# Patient Record
Sex: MALE | Race: OTHER | HISPANIC OR LATINO | URBAN - METROPOLITAN AREA
[De-identification: names, ages, dates, MRNs, and addresses within clinical notes are randomized per-mention and may not be internally consistent; named-entity substitution may affect disease eponyms.]

---

## 2017-03-23 ENCOUNTER — EMERGENCY (EMERGENCY)
Facility: HOSPITAL | Age: 42
LOS: 0 days | Discharge: HOME | End: 2017-03-23
Attending: EMERGENCY MEDICINE | Admitting: EMERGENCY MEDICINE
Payer: OTHER MISCELLANEOUS

## 2017-03-23 VITALS
DIASTOLIC BLOOD PRESSURE: 103 MMHG | SYSTOLIC BLOOD PRESSURE: 170 MMHG | RESPIRATION RATE: 18 BRPM | TEMPERATURE: 99 F | OXYGEN SATURATION: 99 % | HEART RATE: 75 BPM

## 2017-03-23 VITALS — HEIGHT: 64 IN | WEIGHT: 145.06 LBS

## 2017-03-23 DIAGNOSIS — M54.2 CERVICALGIA: ICD-10-CM

## 2017-03-23 LAB
ALBUMIN SERPL ELPH-MCNC: 4.5 G/DL — SIGNIFICANT CHANGE UP (ref 3.3–5)
ALP SERPL-CCNC: 96 U/L — SIGNIFICANT CHANGE UP (ref 40–120)
ALT FLD-CCNC: 42 U/L — SIGNIFICANT CHANGE UP (ref 12–78)
ANION GAP SERPL CALC-SCNC: 10 MMOL/L — SIGNIFICANT CHANGE UP (ref 5–17)
AST SERPL-CCNC: 31 U/L — SIGNIFICANT CHANGE UP (ref 15–37)
BASOPHILS # BLD AUTO: 0.1 K/UL — SIGNIFICANT CHANGE UP (ref 0–0.2)
BASOPHILS NFR BLD AUTO: 1.1 % — SIGNIFICANT CHANGE UP (ref 0–2)
BILIRUB SERPL-MCNC: 0.5 MG/DL — SIGNIFICANT CHANGE UP (ref 0.2–1.2)
BUN SERPL-MCNC: 12 MG/DL — SIGNIFICANT CHANGE UP (ref 7–23)
CALCIUM SERPL-MCNC: 9.3 MG/DL — SIGNIFICANT CHANGE UP (ref 8.5–10.1)
CHLORIDE SERPL-SCNC: 103 MMOL/L — SIGNIFICANT CHANGE UP (ref 96–108)
CO2 SERPL-SCNC: 28 MMOL/L — SIGNIFICANT CHANGE UP (ref 22–31)
CREAT SERPL-MCNC: 1 MG/DL — SIGNIFICANT CHANGE UP (ref 0.5–1.3)
EOSINOPHIL # BLD AUTO: 0.4 K/UL — SIGNIFICANT CHANGE UP (ref 0–0.5)
EOSINOPHIL NFR BLD AUTO: 5.2 % — SIGNIFICANT CHANGE UP (ref 0–6)
GLUCOSE SERPL-MCNC: 113 MG/DL — HIGH (ref 70–99)
HCT VFR BLD CALC: 51 % — HIGH (ref 39–50)
HGB BLD-MCNC: 17.9 G/DL — HIGH (ref 13–17)
LYMPHOCYTES # BLD AUTO: 1.8 K/UL — SIGNIFICANT CHANGE UP (ref 1–3.3)
LYMPHOCYTES # BLD AUTO: 26.8 % — SIGNIFICANT CHANGE UP (ref 13–44)
MCHC RBC-ENTMCNC: 30.7 PG — SIGNIFICANT CHANGE UP (ref 27–34)
MCHC RBC-ENTMCNC: 35 GM/DL — SIGNIFICANT CHANGE UP (ref 32–36)
MCV RBC AUTO: 87.6 FL — SIGNIFICANT CHANGE UP (ref 80–100)
MONOCYTES # BLD AUTO: 0.4 K/UL — SIGNIFICANT CHANGE UP (ref 0–0.9)
MONOCYTES NFR BLD AUTO: 6.2 % — SIGNIFICANT CHANGE UP (ref 2–14)
NEUTROPHILS # BLD AUTO: 4.2 K/UL — SIGNIFICANT CHANGE UP (ref 1.8–7.4)
NEUTROPHILS NFR BLD AUTO: 60.7 % — SIGNIFICANT CHANGE UP (ref 43–77)
PLATELET # BLD AUTO: 260 K/UL — SIGNIFICANT CHANGE UP (ref 150–400)
POTASSIUM SERPL-MCNC: 4.2 MMOL/L — SIGNIFICANT CHANGE UP (ref 3.5–5.3)
POTASSIUM SERPL-SCNC: 4.2 MMOL/L — SIGNIFICANT CHANGE UP (ref 3.5–5.3)
PROT SERPL-MCNC: 8 GM/DL — SIGNIFICANT CHANGE UP (ref 6–8.3)
RBC # BLD: 5.82 M/UL — HIGH (ref 4.2–5.8)
RBC # FLD: 11.1 % — SIGNIFICANT CHANGE UP (ref 10.3–14.5)
S PYO AG SPEC QL IA: NEGATIVE — SIGNIFICANT CHANGE UP
SODIUM SERPL-SCNC: 141 MMOL/L — SIGNIFICANT CHANGE UP (ref 135–145)
WBC # BLD: 6.9 K/UL — SIGNIFICANT CHANGE UP (ref 3.8–10.5)
WBC # FLD AUTO: 6.9 K/UL — SIGNIFICANT CHANGE UP (ref 3.8–10.5)

## 2017-03-23 PROCEDURE — 99284 EMERGENCY DEPT VISIT MOD MDM: CPT

## 2017-03-23 PROCEDURE — 70491 CT SOFT TISSUE NECK W/DYE: CPT | Mod: 26

## 2017-03-23 RX ORDER — SODIUM CHLORIDE 9 MG/ML
1000 INJECTION INTRAMUSCULAR; INTRAVENOUS; SUBCUTANEOUS ONCE
Qty: 0 | Refills: 0 | Status: COMPLETED | OUTPATIENT
Start: 2017-03-23 | End: 2017-03-23

## 2017-03-23 RX ADMIN — SODIUM CHLORIDE 1000 MILLILITER(S): 9 INJECTION INTRAMUSCULAR; INTRAVENOUS; SUBCUTANEOUS at 13:55

## 2017-03-23 NOTE — ED STATDOCS - NS ED MD SCRIBE ATTENDING SCRIBE SECTIONS
PROGRESS NOTE/HISTORY OF PRESENT ILLNESS/CONSULTATIONS/SHIFT CHANGE/PAST MEDICAL/SURGICAL/SOCIAL HISTORY/PHYSICAL EXAM/RESULTS/DISPOSITION/REVIEW OF SYSTEMS

## 2017-03-23 NOTE — ED STATDOCS - ATTENDING CONTRIBUTION TO CARE
I, Hudson Jackman MD,  performed the initial face to face bedside interview with this patient regarding history of present illness, review of symptoms and relevant past medical, social and family history.  I completed an independent physical examination.  I was the initial provider who evaluated this patient. I have signed out the follow up of any pending tests (i.e. labs, radiological studies) to the ACP.  I have communicated the patient’s plan of care and disposition with the ACP.  The history, relevant review of systems, past medical and surgical history, medical decision making, and physical examination was documented by the scribe in my presence and I attest to the accuracy of the documentation.

## 2017-03-23 NOTE — ED STATDOCS - ENMT, MLM
Nasal mucosa clear.  Mouth with normal mucosa  Throat has no vesicles, no oropharyngeal exudates and uvula is midline. tonsil swelling right side, mild erythema, mild cervical lymphadenopathy

## 2017-03-23 NOTE — ED STATDOCS - OBJECTIVE STATEMENT
40 y/o M presents to ED c/o right ear pain with radiation to right side of neck. +Pain with swallowing. Had an accident in his right ear 3 years ago, had negative workups for infections, c/o persistent right ear pain. Was on amoxicillin last week. Ear pain worse in cold weather. Denies other constitutional complaints at this time. Denies recent travel. Denies smoking, drinking, drug use.

## 2017-03-23 NOTE — ED STATDOCS - PROGRESS NOTE DETAILS
41 yr. old male PMH:  presents to ED with pain in right ear radiating  to right side of neck 41 yr. old male PMH:  presents to ED with pain in right ear radiating  to right side of neck Rx. amoxicillin 1 wek ago by PMD  Dr. Nicole at [facility].  Seen and examined by attending MD Dr. Jackman in Intake. IV Labs, CT soft tissue neck. Will F/U with results and re evaluate. MTangalber NP

## 2017-04-03 ENCOUNTER — EMERGENCY (EMERGENCY)
Facility: HOSPITAL | Age: 42
LOS: 0 days | Discharge: HOME | End: 2017-04-03
Attending: EMERGENCY MEDICINE | Admitting: EMERGENCY MEDICINE
Payer: MEDICAID

## 2017-04-03 VITALS
HEART RATE: 72 BPM | DIASTOLIC BLOOD PRESSURE: 95 MMHG | OXYGEN SATURATION: 100 % | TEMPERATURE: 98 F | SYSTOLIC BLOOD PRESSURE: 137 MMHG | RESPIRATION RATE: 18 BRPM

## 2017-04-03 VITALS — WEIGHT: 145.95 LBS | HEIGHT: 66 IN

## 2017-04-03 DIAGNOSIS — R42 DIZZINESS AND GIDDINESS: ICD-10-CM

## 2017-04-03 DIAGNOSIS — R10.9 UNSPECIFIED ABDOMINAL PAIN: ICD-10-CM

## 2017-04-03 DIAGNOSIS — R11.10 VOMITING, UNSPECIFIED: ICD-10-CM

## 2017-04-03 LAB
ALBUMIN SERPL ELPH-MCNC: 4.1 G/DL — SIGNIFICANT CHANGE UP (ref 3.3–5)
ALP SERPL-CCNC: 83 U/L — SIGNIFICANT CHANGE UP (ref 40–120)
ALT FLD-CCNC: 41 U/L — SIGNIFICANT CHANGE UP (ref 12–78)
ANION GAP SERPL CALC-SCNC: 8 MMOL/L — SIGNIFICANT CHANGE UP (ref 5–17)
APPEARANCE UR: (no result)
AST SERPL-CCNC: 25 U/L — SIGNIFICANT CHANGE UP (ref 15–37)
BACTERIA # UR AUTO: NEGATIVE — SIGNIFICANT CHANGE UP
BASOPHILS # BLD AUTO: 0.1 K/UL — SIGNIFICANT CHANGE UP (ref 0–0.2)
BASOPHILS NFR BLD AUTO: 1.1 % — SIGNIFICANT CHANGE UP (ref 0–2)
BILIRUB SERPL-MCNC: 0.6 MG/DL — SIGNIFICANT CHANGE UP (ref 0.2–1.2)
BILIRUB UR-MCNC: NEGATIVE — SIGNIFICANT CHANGE UP
BUN SERPL-MCNC: 11 MG/DL — SIGNIFICANT CHANGE UP (ref 7–23)
CALCIUM SERPL-MCNC: 9.2 MG/DL — SIGNIFICANT CHANGE UP (ref 8.5–10.1)
CHLORIDE SERPL-SCNC: 107 MMOL/L — SIGNIFICANT CHANGE UP (ref 96–108)
CO2 SERPL-SCNC: 27 MMOL/L — SIGNIFICANT CHANGE UP (ref 22–31)
COLOR SPEC: YELLOW — SIGNIFICANT CHANGE UP
COMMENT - URINE: SIGNIFICANT CHANGE UP
CREAT SERPL-MCNC: 0.97 MG/DL — SIGNIFICANT CHANGE UP (ref 0.5–1.3)
DIFF PNL FLD: NEGATIVE — SIGNIFICANT CHANGE UP
EOSINOPHIL # BLD AUTO: 0.2 K/UL — SIGNIFICANT CHANGE UP (ref 0–0.5)
EOSINOPHIL NFR BLD AUTO: 2.6 % — SIGNIFICANT CHANGE UP (ref 0–6)
EPI CELLS # UR: SIGNIFICANT CHANGE UP
GLUCOSE SERPL-MCNC: 115 MG/DL — HIGH (ref 70–99)
GLUCOSE UR QL: NEGATIVE MG/DL — SIGNIFICANT CHANGE UP
HCT VFR BLD CALC: 48.9 % — SIGNIFICANT CHANGE UP (ref 39–50)
HGB BLD-MCNC: 17.3 G/DL — HIGH (ref 13–17)
KETONES UR-MCNC: NEGATIVE — SIGNIFICANT CHANGE UP
LEUKOCYTE ESTERASE UR-ACNC: NEGATIVE — SIGNIFICANT CHANGE UP
LIDOCAIN IGE QN: 134 U/L — SIGNIFICANT CHANGE UP (ref 73–393)
LYMPHOCYTES # BLD AUTO: 1.6 K/UL — SIGNIFICANT CHANGE UP (ref 1–3.3)
LYMPHOCYTES # BLD AUTO: 22.1 % — SIGNIFICANT CHANGE UP (ref 13–44)
MCHC RBC-ENTMCNC: 30.5 PG — SIGNIFICANT CHANGE UP (ref 27–34)
MCHC RBC-ENTMCNC: 35.4 GM/DL — SIGNIFICANT CHANGE UP (ref 32–36)
MCV RBC AUTO: 86.1 FL — SIGNIFICANT CHANGE UP (ref 80–100)
MONOCYTES # BLD AUTO: 0.4 K/UL — SIGNIFICANT CHANGE UP (ref 0–0.9)
MONOCYTES NFR BLD AUTO: 5.2 % — SIGNIFICANT CHANGE UP (ref 2–14)
NEUTROPHILS # BLD AUTO: 5.1 K/UL — SIGNIFICANT CHANGE UP (ref 1.8–7.4)
NEUTROPHILS NFR BLD AUTO: 68.9 % — SIGNIFICANT CHANGE UP (ref 43–77)
NITRITE UR-MCNC: NEGATIVE — SIGNIFICANT CHANGE UP
PH UR: 8 — SIGNIFICANT CHANGE UP (ref 4.8–8)
PLATELET # BLD AUTO: 286 K/UL — SIGNIFICANT CHANGE UP (ref 150–400)
POTASSIUM SERPL-MCNC: 3.8 MMOL/L — SIGNIFICANT CHANGE UP (ref 3.5–5.3)
POTASSIUM SERPL-SCNC: 3.8 MMOL/L — SIGNIFICANT CHANGE UP (ref 3.5–5.3)
PROT SERPL-MCNC: 7.3 GM/DL — SIGNIFICANT CHANGE UP (ref 6–8.3)
PROT UR-MCNC: NEGATIVE MG/DL — SIGNIFICANT CHANGE UP
RBC # BLD: 5.68 M/UL — SIGNIFICANT CHANGE UP (ref 4.2–5.8)
RBC # FLD: 11 % — SIGNIFICANT CHANGE UP (ref 10.3–14.5)
RBC CASTS # UR COMP ASSIST: NEGATIVE /HPF — SIGNIFICANT CHANGE UP (ref 0–4)
SODIUM SERPL-SCNC: 142 MMOL/L — SIGNIFICANT CHANGE UP (ref 135–145)
SP GR SPEC: 1.01 — SIGNIFICANT CHANGE UP (ref 1.01–1.02)
UROBILINOGEN FLD QL: NEGATIVE MG/DL — SIGNIFICANT CHANGE UP
WBC # BLD: 7.4 K/UL — SIGNIFICANT CHANGE UP (ref 3.8–10.5)
WBC # FLD AUTO: 7.4 K/UL — SIGNIFICANT CHANGE UP (ref 3.8–10.5)
WBC UR QL: NEGATIVE — SIGNIFICANT CHANGE UP

## 2017-04-03 PROCEDURE — 74177 CT ABD & PELVIS W/CONTRAST: CPT | Mod: 26

## 2017-04-03 PROCEDURE — 71020: CPT | Mod: 26

## 2017-04-03 PROCEDURE — 93010 ELECTROCARDIOGRAM REPORT: CPT

## 2017-04-03 PROCEDURE — 99284 EMERGENCY DEPT VISIT MOD MDM: CPT

## 2017-04-03 RX ORDER — SODIUM CHLORIDE 9 MG/ML
1000 INJECTION INTRAMUSCULAR; INTRAVENOUS; SUBCUTANEOUS ONCE
Qty: 0 | Refills: 0 | Status: COMPLETED | OUTPATIENT
Start: 2017-04-03 | End: 2017-04-03

## 2017-04-03 RX ORDER — ONDANSETRON 8 MG/1
4 TABLET, FILM COATED ORAL ONCE
Qty: 0 | Refills: 0 | Status: COMPLETED | OUTPATIENT
Start: 2017-04-03 | End: 2017-04-03

## 2017-04-03 RX ORDER — ONDANSETRON 8 MG/1
1 TABLET, FILM COATED ORAL
Qty: 15 | Refills: 0 | OUTPATIENT
Start: 2017-04-03

## 2017-04-03 RX ADMIN — SODIUM CHLORIDE 1000 MILLILITER(S): 9 INJECTION INTRAMUSCULAR; INTRAVENOUS; SUBCUTANEOUS at 09:52

## 2017-04-03 RX ADMIN — ONDANSETRON 4 MILLIGRAM(S): 8 TABLET, FILM COATED ORAL at 09:51

## 2017-04-03 RX ADMIN — SODIUM CHLORIDE 1000 MILLILITER(S): 9 INJECTION INTRAMUSCULAR; INTRAVENOUS; SUBCUTANEOUS at 10:34

## 2017-04-03 NOTE — ED ADULT NURSE NOTE - OBJECTIVE STATEMENT
Pt reports 2 days of nausea with vomiting which worsens after eating.  Pt recently dx with pharyngitis and has been taking amoxicillin, course completed 3 days ago.

## 2017-04-03 NOTE — ED PROVIDER NOTE - OBJECTIVE STATEMENT
42 yo pt presents with abdominal pain, vomit, cough and sputum 40 yo pt presents with abdominal pain, vomit, cough and sputum.  Pt has been under a lot of stress at work.  No travel, no sick contact.  Symptoms started a couple of days ago.  Nothing makes better or worse

## 2017-04-03 NOTE — ED ADULT NURSE NOTE - ED STAT RN HANDOFF DETAILS
report received from angy avery, pt aaox4, pt resting comfortably on stretcher, pt con't to c/o midepigastric pain 4/10, offered pain med, pt refused, fluids infusing without difficulty, vss, afebrile, will cont' to monitor

## 2017-04-04 ENCOUNTER — APPOINTMENT (OUTPATIENT)
Dept: RADIOLOGY | Facility: CLINIC | Age: 42
End: 2017-04-04

## 2017-04-04 ENCOUNTER — OUTPATIENT (OUTPATIENT)
Dept: OUTPATIENT SERVICES | Facility: HOSPITAL | Age: 42
LOS: 1 days | End: 2017-04-04
Payer: COMMERCIAL

## 2017-04-04 DIAGNOSIS — Z00.8 ENCOUNTER FOR OTHER GENERAL EXAMINATION: ICD-10-CM

## 2017-04-04 PROBLEM — Z00.00 ENCOUNTER FOR PREVENTIVE HEALTH EXAMINATION: Status: ACTIVE | Noted: 2017-04-04

## 2017-04-04 PROCEDURE — 71046 X-RAY EXAM CHEST 2 VIEWS: CPT

## 2017-04-05 ENCOUNTER — EMERGENCY (EMERGENCY)
Facility: HOSPITAL | Age: 42
LOS: 0 days | Discharge: HOME | End: 2017-04-05
Attending: EMERGENCY MEDICINE | Admitting: EMERGENCY MEDICINE
Payer: MEDICAID

## 2017-04-05 VITALS
OXYGEN SATURATION: 96 % | WEIGHT: 145.95 LBS | SYSTOLIC BLOOD PRESSURE: 171 MMHG | RESPIRATION RATE: 18 BRPM | TEMPERATURE: 98 F | DIASTOLIC BLOOD PRESSURE: 100 MMHG | HEART RATE: 104 BPM

## 2017-04-05 VITALS
DIASTOLIC BLOOD PRESSURE: 94 MMHG | OXYGEN SATURATION: 99 % | RESPIRATION RATE: 15 BRPM | SYSTOLIC BLOOD PRESSURE: 168 MMHG | HEART RATE: 73 BPM

## 2017-04-05 DIAGNOSIS — R10.9 UNSPECIFIED ABDOMINAL PAIN: ICD-10-CM

## 2017-04-05 DIAGNOSIS — R11.10 VOMITING, UNSPECIFIED: ICD-10-CM

## 2017-04-05 LAB
ALBUMIN SERPL ELPH-MCNC: 4.7 G/DL — SIGNIFICANT CHANGE UP (ref 3.3–5)
ALP SERPL-CCNC: 95 U/L — SIGNIFICANT CHANGE UP (ref 40–120)
ALT FLD-CCNC: 41 U/L — SIGNIFICANT CHANGE UP (ref 12–78)
ANION GAP SERPL CALC-SCNC: 11 MMOL/L — SIGNIFICANT CHANGE UP (ref 5–17)
APPEARANCE UR: CLEAR — SIGNIFICANT CHANGE UP
AST SERPL-CCNC: 30 U/L — SIGNIFICANT CHANGE UP (ref 15–37)
BASOPHILS # BLD AUTO: 0.1 K/UL — SIGNIFICANT CHANGE UP (ref 0–0.2)
BASOPHILS NFR BLD AUTO: 0.6 % — SIGNIFICANT CHANGE UP (ref 0–2)
BILIRUB SERPL-MCNC: 0.7 MG/DL — SIGNIFICANT CHANGE UP (ref 0.2–1.2)
BILIRUB UR-MCNC: NEGATIVE — SIGNIFICANT CHANGE UP
BUN SERPL-MCNC: 8 MG/DL — SIGNIFICANT CHANGE UP (ref 7–23)
CALCIUM SERPL-MCNC: 10 MG/DL — SIGNIFICANT CHANGE UP (ref 8.5–10.1)
CHLORIDE SERPL-SCNC: 99 MMOL/L — SIGNIFICANT CHANGE UP (ref 96–108)
CK SERPL-CCNC: 373 U/L — HIGH (ref 26–308)
CO2 SERPL-SCNC: 27 MMOL/L — SIGNIFICANT CHANGE UP (ref 22–31)
COLOR SPEC: YELLOW — SIGNIFICANT CHANGE UP
COMMENT - URINE: SIGNIFICANT CHANGE UP
CREAT SERPL-MCNC: 0.93 MG/DL — SIGNIFICANT CHANGE UP (ref 0.5–1.3)
DIFF PNL FLD: (no result)
EOSINOPHIL # BLD AUTO: 0.3 K/UL — SIGNIFICANT CHANGE UP (ref 0–0.5)
EOSINOPHIL NFR BLD AUTO: 3.5 % — SIGNIFICANT CHANGE UP (ref 0–6)
EPI CELLS # UR: SIGNIFICANT CHANGE UP
GLUCOSE SERPL-MCNC: 105 MG/DL — HIGH (ref 70–99)
GLUCOSE UR QL: NEGATIVE MG/DL — SIGNIFICANT CHANGE UP
HCT VFR BLD CALC: 51.2 % — HIGH (ref 39–50)
HGB BLD-MCNC: 17.9 G/DL — HIGH (ref 13–17)
KETONES UR-MCNC: NEGATIVE — SIGNIFICANT CHANGE UP
LEUKOCYTE ESTERASE UR-ACNC: NEGATIVE — SIGNIFICANT CHANGE UP
LIDOCAIN IGE QN: 134 U/L — SIGNIFICANT CHANGE UP (ref 73–393)
LYMPHOCYTES # BLD AUTO: 1.8 K/UL — SIGNIFICANT CHANGE UP (ref 1–3.3)
LYMPHOCYTES # BLD AUTO: 22.8 % — SIGNIFICANT CHANGE UP (ref 13–44)
MCHC RBC-ENTMCNC: 30.3 PG — SIGNIFICANT CHANGE UP (ref 27–34)
MCHC RBC-ENTMCNC: 35.1 GM/DL — SIGNIFICANT CHANGE UP (ref 32–36)
MCV RBC AUTO: 86.4 FL — SIGNIFICANT CHANGE UP (ref 80–100)
MONOCYTES # BLD AUTO: 0.6 K/UL — SIGNIFICANT CHANGE UP (ref 0–0.9)
MONOCYTES NFR BLD AUTO: 7.3 % — SIGNIFICANT CHANGE UP (ref 2–14)
NEUTROPHILS # BLD AUTO: 5.2 K/UL — SIGNIFICANT CHANGE UP (ref 1.8–7.4)
NEUTROPHILS NFR BLD AUTO: 65.8 % — SIGNIFICANT CHANGE UP (ref 43–77)
NITRITE UR-MCNC: NEGATIVE — SIGNIFICANT CHANGE UP
PH UR: 8 — SIGNIFICANT CHANGE UP (ref 4.8–8)
PLATELET # BLD AUTO: 300 K/UL — SIGNIFICANT CHANGE UP (ref 150–400)
POTASSIUM SERPL-MCNC: 3.2 MMOL/L — LOW (ref 3.5–5.3)
POTASSIUM SERPL-SCNC: 3.2 MMOL/L — LOW (ref 3.5–5.3)
PROT SERPL-MCNC: 8.5 GM/DL — HIGH (ref 6–8.3)
PROT UR-MCNC: NEGATIVE MG/DL — SIGNIFICANT CHANGE UP
RBC # BLD: 5.92 M/UL — HIGH (ref 4.2–5.8)
RBC # FLD: 10.7 % — SIGNIFICANT CHANGE UP (ref 10.3–14.5)
RBC CASTS # UR COMP ASSIST: SIGNIFICANT CHANGE UP /HPF (ref 0–4)
SODIUM SERPL-SCNC: 137 MMOL/L — SIGNIFICANT CHANGE UP (ref 135–145)
SP GR SPEC: 1.01 — SIGNIFICANT CHANGE UP (ref 1.01–1.02)
TROPONIN I SERPL-MCNC: <0.015 NG/ML — SIGNIFICANT CHANGE UP (ref 0.01–0.04)
TROPONIN I SERPL-MCNC: <0.015 NG/ML — SIGNIFICANT CHANGE UP (ref 0.01–0.04)
UROBILINOGEN FLD QL: NEGATIVE MG/DL — SIGNIFICANT CHANGE UP
WBC # BLD: 7.9 K/UL — SIGNIFICANT CHANGE UP (ref 3.8–10.5)
WBC # FLD AUTO: 7.9 K/UL — SIGNIFICANT CHANGE UP (ref 3.8–10.5)
WBC UR QL: NEGATIVE — SIGNIFICANT CHANGE UP

## 2017-04-05 PROCEDURE — 99283 EMERGENCY DEPT VISIT LOW MDM: CPT

## 2017-04-05 PROCEDURE — 93010 ELECTROCARDIOGRAM REPORT: CPT

## 2017-04-05 RX ORDER — SODIUM CHLORIDE 9 MG/ML
3 INJECTION INTRAMUSCULAR; INTRAVENOUS; SUBCUTANEOUS ONCE
Qty: 0 | Refills: 0 | Status: COMPLETED | OUTPATIENT
Start: 2017-04-05 | End: 2017-04-05

## 2017-04-05 RX ORDER — SODIUM CHLORIDE 9 MG/ML
2000 INJECTION INTRAMUSCULAR; INTRAVENOUS; SUBCUTANEOUS ONCE
Qty: 0 | Refills: 0 | Status: COMPLETED | OUTPATIENT
Start: 2017-04-05 | End: 2017-04-05

## 2017-04-05 RX ORDER — FAMOTIDINE 10 MG/ML
20 INJECTION INTRAVENOUS ONCE
Qty: 0 | Refills: 0 | Status: COMPLETED | OUTPATIENT
Start: 2017-04-05 | End: 2017-04-05

## 2017-04-05 RX ORDER — ONDANSETRON 8 MG/1
4 TABLET, FILM COATED ORAL ONCE
Qty: 0 | Refills: 0 | Status: COMPLETED | OUTPATIENT
Start: 2017-04-05 | End: 2017-04-05

## 2017-04-05 RX ADMIN — ONDANSETRON 4 MILLIGRAM(S): 8 TABLET, FILM COATED ORAL at 12:46

## 2017-04-05 RX ADMIN — FAMOTIDINE 20 MILLIGRAM(S): 10 INJECTION INTRAVENOUS at 12:46

## 2017-04-05 RX ADMIN — SODIUM CHLORIDE 1000 MILLILITER(S): 9 INJECTION INTRAMUSCULAR; INTRAVENOUS; SUBCUTANEOUS at 12:47

## 2017-04-05 RX ADMIN — SODIUM CHLORIDE 3 MILLILITER(S): 9 INJECTION INTRAMUSCULAR; INTRAVENOUS; SUBCUTANEOUS at 12:47

## 2017-04-05 NOTE — ED PROVIDER NOTE - CHPI ED SYMPTOMS NEG
no fever/no abdominal distension/no burning urination/no hematuria/no diarrhea/no blood in stool/no dysuria

## 2017-04-05 NOTE — ED PROVIDER NOTE - OBJECTIVE STATEMENT
Exam begun at 08:10.  42 yo HM, PMH of HTN, ambulatory to ED c/o'ing abdominal pain & N.  3 - 4 dd. mid-abd. cramping-type pain, constant.  + N w/ V (5x yesterday), general weak/fatigue, decreased appetite, + chills, diaphoresis.  No F, D, rash.  Pt evaluated  ED 2 dd. for same c/o: labs, IVF, CT A/P negative & pt D/C'ed home w/ Dx viral AGE.  [facility] f/u yesterday: started on ranitidine bid & dyazide.  While in ED, pt reported mild L chest tightness, nonradiating, nonpleuritic. Exam begun at 08:10.  42 yo HM, PMH of HTN, ambulatory to ED c/o'ing abdominal pain & N.  3 - 4 dd. mid-abd. cramping-type pain, constant.  + N w/ V (5x yesterday), general weak/fatigue, decreased appetite, + chills, diaphoresis.  No F, D, rash.  Pt evaluated  ED 2 dd. for same c/o: labs, IVF, CT A/P negative & pt D/C'ed home.  [facility] f/u yesterday: started on ranitidine bid & dyazide qd.  While in ED, pt reported mild L chest tightness, nonradiating, nonpleuritic.

## 2017-04-05 NOTE — ED PROVIDER NOTE - MUSCULOSKELETAL, MLM
Spine appears normal, range of motion is not limited, no muscle or joint tenderness.  Neck: NT, supple.  HERRERA x 4., no focal tenderness.

## 2017-04-05 NOTE — ED PROVIDER NOTE - PROGRESS NOTE DETAILS
Dr. Burns:  Reevaluated patient at bedside.  Patient feeling much improved, abd re-examined: + NT, stable, pt tolerated po fluids well.  Labs, CT A/P from 2 dd. ago reviewed.  Discussed the results of all diagnostic testing in ED and copies of all reports given.   An opportunity to ask questions was given.  Discussed the importance of prompt, close medical follow-up.  Patient will return with any changes, concerns or persistent / worsening symptoms.  Understanding of all instructions verbalized. Dr. Burns:  Reevaluated patient at bedside.  Patient feeling much improved, no cp, abd re-examined: + NT, stable, pt tolerated po fluids well.  Labs, CT A/P from 2 dd. ago reviewed.  Discussed the results of all diagnostic testing in ED and copies of all reports given.   An opportunity to ask questions was given.  Discussed the importance of prompt, close medical follow-up.  Patient will return with any changes, concerns or persistent / worsening symptoms.  Understanding of all instructions verbalized.

## 2017-04-05 NOTE — ED PROVIDER NOTE - CONSTITUTIONAL, MLM
normal... Well appearing, well nourished, awake, alert, oriented to person, place, time/situation, mildly ill-appearing.  No respiratory discomfort.

## 2017-04-05 NOTE — ED PROVIDER NOTE - CARDIAC, MLM
Normal rate, regular rhythm.  Heart sounds S1, S2.  No murmurs, rubs or gallops.  Normal radial pulse.

## 2017-04-05 NOTE — ED PROVIDER NOTE - DIAGNOSIS COUNSELING, MDM
conducted a detailed discussion... I had a detailed discussion with the patient  regarding the historical points, exam findings, and any diagnostic results supporting the discharge diagnosis.

## 2017-04-05 NOTE — ED ADULT NURSE REASSESSMENT NOTE - NS ED NURSE REASSESS COMMENT FT1
Recd care of patient at 1200 A & O x 4, VS as charted. c/o "little nausea". Medicated per orders with some relief. Urine specimen obtained. Will continue to monitor.

## 2017-04-05 NOTE — ED ADULT NURSE REASSESSMENT NOTE - NS ED NURSE REASSESS COMMENT FT1
Pt states he feels better with meds given. Tolerated juice and turkey sandwich without nausea or vomiting. Awaiting d/c dispo. Will continue to monitor.

## 2017-04-08 ENCOUNTER — EMERGENCY (EMERGENCY)
Facility: HOSPITAL | Age: 42
LOS: 0 days | Discharge: HOME | End: 2017-04-08
Attending: EMERGENCY MEDICINE | Admitting: EMERGENCY MEDICINE
Payer: MEDICAID

## 2017-04-08 VITALS — HEIGHT: 66 IN | WEIGHT: 139.99 LBS

## 2017-04-08 VITALS
OXYGEN SATURATION: 97 % | SYSTOLIC BLOOD PRESSURE: 158 MMHG | TEMPERATURE: 98 F | RESPIRATION RATE: 20 BRPM | HEART RATE: 86 BPM | DIASTOLIC BLOOD PRESSURE: 110 MMHG

## 2017-04-08 DIAGNOSIS — F41.9 ANXIETY DISORDER, UNSPECIFIED: ICD-10-CM

## 2017-04-08 DIAGNOSIS — M79.1 MYALGIA: ICD-10-CM

## 2017-04-08 DIAGNOSIS — I10 ESSENTIAL (PRIMARY) HYPERTENSION: ICD-10-CM

## 2017-04-08 PROCEDURE — 99284 EMERGENCY DEPT VISIT MOD MDM: CPT | Mod: 25

## 2017-04-08 PROCEDURE — 93010 ELECTROCARDIOGRAM REPORT: CPT

## 2017-04-08 PROCEDURE — 99053 MED SERV 10PM-8AM 24 HR FAC: CPT

## 2017-04-08 RX ADMIN — Medication 1 MILLIGRAM(S): at 01:34

## 2017-04-08 NOTE — ED PROVIDER NOTE - NS ED MD SCRIBE ATTENDING SCRIBE SECTIONS
PROGRESS NOTE/DISPOSITION/HISTORY OF PRESENT ILLNESS/PAST MEDICAL/SURGICAL/SOCIAL HISTORY/RESULTS/REVIEW OF SYSTEMS/PHYSICAL EXAM

## 2017-04-08 NOTE — ED PROVIDER NOTE - MEDICAL DECISION MAKING DETAILS
pt chest pain does not appear to be cardiac in nature per hpi he is anxious mostly complaing about not sleeping asa per day pcp follow up no suicidal or homcidial ideation return to ed for intractable chest pain/sob or new onset motor or sensory deficits.

## 2017-04-08 NOTE — ED ADULT NURSE NOTE - OBJECTIVE STATEMENT
Patient arrived to ED c/o insomnia x 4 days secondary to stress and anxiety due to loss of job. Report headache because he can not sleep. Patient anxious at this time. Reports hx of HTN. Unable to provide name of medication he is currently taking.

## 2017-04-08 NOTE — ED PROVIDER NOTE - OBJECTIVE STATEMENT
40 y/o M with hx of HTN presents to the ED c/o difficulty sleeping for 4 days. Pt also notes headache and mild intermittent CP. He states he has been anxious due to losing his job recently. Denies SI/HI and hallucinations. denies fever. denies neck pain. no sob. no abd pain. no n/v/d. no urinary f/u/d. no back pain. no motor or sensory deficits. denies drug use. no recent travel. no rash. no other acute issues symptoms or concerns. Pt is a [place] speaker, history obtained through nurse at bedside.

## 2017-04-08 NOTE — ED ADULT NURSE REASSESSMENT NOTE - NS ED NURSE REASSESS COMMENT FT1
Patient anxious, reports he cannot sleep. Medicated with Ativa 1 mg PO. Instructed to take BP medication at home and follow up with PCP in the next 24-48 hrs. Patient understood instructions.

## 2017-04-11 ENCOUNTER — EMERGENCY (EMERGENCY)
Facility: HOSPITAL | Age: 42
LOS: 0 days | Discharge: HOME | End: 2017-04-11
Attending: EMERGENCY MEDICINE | Admitting: EMERGENCY MEDICINE
Payer: MEDICAID

## 2017-04-11 VITALS
RESPIRATION RATE: 16 BRPM | HEART RATE: 81 BPM | SYSTOLIC BLOOD PRESSURE: 136 MMHG | TEMPERATURE: 98 F | OXYGEN SATURATION: 99 % | DIASTOLIC BLOOD PRESSURE: 98 MMHG

## 2017-04-11 VITALS — WEIGHT: 139.99 LBS

## 2017-04-11 DIAGNOSIS — I10 ESSENTIAL (PRIMARY) HYPERTENSION: ICD-10-CM

## 2017-04-11 DIAGNOSIS — Z79.899 OTHER LONG TERM (CURRENT) DRUG THERAPY: ICD-10-CM

## 2017-04-11 DIAGNOSIS — R05 COUGH: ICD-10-CM

## 2017-04-11 DIAGNOSIS — F41.1 GENERALIZED ANXIETY DISORDER: ICD-10-CM

## 2017-04-11 DIAGNOSIS — F41.9 ANXIETY DISORDER, UNSPECIFIED: ICD-10-CM

## 2017-04-11 LAB
ALBUMIN SERPL ELPH-MCNC: 4.2 G/DL — SIGNIFICANT CHANGE UP (ref 3.3–5)
ALP SERPL-CCNC: 86 U/L — SIGNIFICANT CHANGE UP (ref 40–120)
ALT FLD-CCNC: 59 U/L — SIGNIFICANT CHANGE UP (ref 12–78)
AMPHET UR-MCNC: NEGATIVE — SIGNIFICANT CHANGE UP
ANION GAP SERPL CALC-SCNC: 7 MMOL/L — SIGNIFICANT CHANGE UP (ref 5–17)
APAP SERPL-MCNC: <2 UG/ML — LOW (ref 10–30)
AST SERPL-CCNC: 35 U/L — SIGNIFICANT CHANGE UP (ref 15–37)
BARBITURATES UR SCN-MCNC: NEGATIVE — SIGNIFICANT CHANGE UP
BASOPHILS # BLD AUTO: 0.1 K/UL — SIGNIFICANT CHANGE UP (ref 0–0.2)
BASOPHILS NFR BLD AUTO: 1.7 % — SIGNIFICANT CHANGE UP (ref 0–2)
BENZODIAZ UR-MCNC: NEGATIVE — SIGNIFICANT CHANGE UP
BILIRUB SERPL-MCNC: 0.8 MG/DL — SIGNIFICANT CHANGE UP (ref 0.2–1.2)
BUN SERPL-MCNC: 8 MG/DL — SIGNIFICANT CHANGE UP (ref 7–23)
CALCIUM SERPL-MCNC: 9.3 MG/DL — SIGNIFICANT CHANGE UP (ref 8.5–10.1)
CHLORIDE SERPL-SCNC: 98 MMOL/L — SIGNIFICANT CHANGE UP (ref 96–108)
CO2 SERPL-SCNC: 28 MMOL/L — SIGNIFICANT CHANGE UP (ref 22–31)
COCAINE METAB.OTHER UR-MCNC: NEGATIVE — SIGNIFICANT CHANGE UP
CREAT SERPL-MCNC: 0.77 MG/DL — SIGNIFICANT CHANGE UP (ref 0.5–1.3)
EOSINOPHIL # BLD AUTO: 0.4 K/UL — SIGNIFICANT CHANGE UP (ref 0–0.5)
EOSINOPHIL NFR BLD AUTO: 5.1 % — SIGNIFICANT CHANGE UP (ref 0–6)
ETHANOL SERPL-MCNC: <10 MG/DL — SIGNIFICANT CHANGE UP (ref 0–10)
GLUCOSE SERPL-MCNC: 115 MG/DL — HIGH (ref 70–99)
HCT VFR BLD CALC: 48.4 % — SIGNIFICANT CHANGE UP (ref 39–50)
HGB BLD-MCNC: 17.3 G/DL — HIGH (ref 13–17)
LYMPHOCYTES # BLD AUTO: 2.2 K/UL — SIGNIFICANT CHANGE UP (ref 1–3.3)
LYMPHOCYTES # BLD AUTO: 25.6 % — SIGNIFICANT CHANGE UP (ref 13–44)
MCHC RBC-ENTMCNC: 30.3 PG — SIGNIFICANT CHANGE UP (ref 27–34)
MCHC RBC-ENTMCNC: 35.7 GM/DL — SIGNIFICANT CHANGE UP (ref 32–36)
MCV RBC AUTO: 84.9 FL — SIGNIFICANT CHANGE UP (ref 80–100)
METHADONE UR-MCNC: NEGATIVE — SIGNIFICANT CHANGE UP
MONOCYTES # BLD AUTO: 0.7 K/UL — SIGNIFICANT CHANGE UP (ref 0–0.9)
MONOCYTES NFR BLD AUTO: 8.6 % — SIGNIFICANT CHANGE UP (ref 2–14)
NEUTROPHILS # BLD AUTO: 5 K/UL — SIGNIFICANT CHANGE UP (ref 1.8–7.4)
NEUTROPHILS NFR BLD AUTO: 59 % — SIGNIFICANT CHANGE UP (ref 43–77)
OPIATES UR-MCNC: NEGATIVE — SIGNIFICANT CHANGE UP
PCP SPEC-MCNC: SIGNIFICANT CHANGE UP
PCP UR-MCNC: NEGATIVE — SIGNIFICANT CHANGE UP
PLATELET # BLD AUTO: 294 K/UL — SIGNIFICANT CHANGE UP (ref 150–400)
POTASSIUM SERPL-MCNC: 3.6 MMOL/L — SIGNIFICANT CHANGE UP (ref 3.5–5.3)
POTASSIUM SERPL-SCNC: 3.6 MMOL/L — SIGNIFICANT CHANGE UP (ref 3.5–5.3)
PROT SERPL-MCNC: 7.4 GM/DL — SIGNIFICANT CHANGE UP (ref 6–8.3)
RBC # BLD: 5.69 M/UL — SIGNIFICANT CHANGE UP (ref 4.2–5.8)
RBC # FLD: 10.5 % — SIGNIFICANT CHANGE UP (ref 10.3–14.5)
SALICYLATES SERPL-MCNC: <1.7 MG/DL — LOW (ref 2.8–20)
SODIUM SERPL-SCNC: 133 MMOL/L — LOW (ref 135–145)
THC UR QL: NEGATIVE — SIGNIFICANT CHANGE UP
WBC # BLD: 8.5 K/UL — SIGNIFICANT CHANGE UP (ref 3.8–10.5)
WBC # FLD AUTO: 8.5 K/UL — SIGNIFICANT CHANGE UP (ref 3.8–10.5)

## 2017-04-11 PROCEDURE — 99053 MED SERV 10PM-8AM 24 HR FAC: CPT

## 2017-04-11 PROCEDURE — 71010: CPT | Mod: 26

## 2017-04-11 PROCEDURE — 93010 ELECTROCARDIOGRAM REPORT: CPT

## 2017-04-11 PROCEDURE — 99284 EMERGENCY DEPT VISIT MOD MDM: CPT | Mod: 25

## 2017-04-11 RX ORDER — ALPRAZOLAM 0.25 MG
0.5 TABLET ORAL ONCE
Qty: 0 | Refills: 0 | Status: DISCONTINUED | OUTPATIENT
Start: 2017-04-11 | End: 2017-04-11

## 2017-04-11 RX ORDER — ALBUTEROL 90 UG/1
2.5 AEROSOL, METERED ORAL ONCE
Qty: 0 | Refills: 0 | Status: COMPLETED | OUTPATIENT
Start: 2017-04-11 | End: 2017-04-11

## 2017-04-11 RX ADMIN — ALBUTEROL 2.5 MILLIGRAM(S): 90 AEROSOL, METERED ORAL at 03:48

## 2017-04-11 RX ADMIN — Medication 0.5 MILLIGRAM(S): at 03:48

## 2017-04-11 NOTE — ED PROVIDER NOTE - OBJECTIVE STATEMENT
41 M with co cough that started today.  He was seen in the Er recently and dx with HTn and anxiety.  Pt was seen by his PMD and given rx for amlodipine.  After further questioning (with ) pt states that he is hearing voices in his head and responding to them.  No co HA, dizziness, cp, sob, abd pain , fever, nvd.  Pt denies any BALWINDER.

## 2017-04-11 NOTE — ED BEHAVIORAL HEALTH ASSESSMENT NOTE - RISK ASSESSMENT
Patient has risk of insomnia and anxiety but has no suicidal thoughts, is a devout Druze, is very tied to his family and does not use substances

## 2017-04-11 NOTE — ED BEHAVIORAL HEALTH ASSESSMENT NOTE - SUICIDE PROTECTIVE FACTORS
Identifies reasons for living/Future oriented/Responsibility to family and others/Supportive social network or family/High spirituality

## 2017-04-11 NOTE — ED BEHAVIORAL HEALTH ASSESSMENT NOTE - HPI (INCLUDE ILLNESS QUALITY, SEVERITY, DURATION, TIMING, CONTEXT, MODIFYING FACTORS, ASSOCIATED SIGNS AND SYMPTOMS)
42 yo man with recent ER visits for somatic complaints.  Presented last night with cough and told MD he was hearing voices.  used  [id number] this Am   the patient reports he is unable to sleep and this is due to multiple stressors. he has financial worries- truck broke down, has to support mother.  he was at [facility] yesterday to have BP meds adjusted and he attibutes blood pressure as well as recent vomiting to his stress.  he had been given some medication in the past which had been helpful but did not want to take as it is not a natural sleep.  Cannot recall name of medication.  he also reports being given a medication for anxiety. [facility] did set him up with a psychotherapy appt but no certain date yet.  He has also tried exercise and yoga with limited benefit

## 2017-04-11 NOTE — ED BEHAVIORAL HEALTH ASSESSMENT NOTE - DETAILS
as above weakness nausea Dr. Burdick.  Discussed he is cleared for discharge.  Discussed potential need to renew current medication that patient says he has with him

## 2017-04-11 NOTE — ED ADULT NURSE REASSESSMENT NOTE - NS ED NURSE REASSESS COMMENT FT1
Assumed care of pt. In report was told pt wasn't sleeping, pt sleeping now so I didn't wake him.1:1 at bedside.

## 2017-04-11 NOTE — ED BEHAVIORAL HEALTH ASSESSMENT NOTE - DESCRIPTION
Quiet lying in bed. 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  Immigrated from [place] 17 yrs ago,  Sister lives in [place].

## 2017-04-11 NOTE — ED BEHAVIORAL HEALTH ASSESSMENT NOTE - SUMMARY
42 yo man with hx of anxiety but recent worsening in context of multiple stressors.  He has had recent ED visits due to somatic manifestations of that anxiety.  He does not have auditory hallucinations but repetitive thoughts about all his responsibilities.  He agrees that he needs psychotherapy, especially in light of his reluctance to be on long-term medication.  he will take medication temporarily for anxiety and insomnia

## 2017-04-11 NOTE — ED ADULT NURSE NOTE - OBJECTIVE STATEMENT
Patient arrived to ED c/o cough associated with SOB. Per patient he woke up in the middle of the night with cough, sweaty, SOB. Patient recently seen at ED with c/o insomnia. Per patient "voices in my head are not letting me sleep". Patient reports he sees "in my head a woman with an ax and monsters". Patient reports he has been unemployed for a month. He stays home taking care of daughter, but has been suffering from HA and insomnia x 1 month. Denies any previous psych history. Denies SI at this moment. Seen by PCP and started on Lexapro. Patient is cooperative at this moment. 1:1 in progress for safety. Belongings checked in with security.

## 2017-04-11 NOTE — ED BEHAVIORAL HEALTH ASSESSMENT NOTE - SAFETY PLAN DETAILS
Not a risk of self harm.  he can return to ED if necessary Not a risk of self harm.  He can return to ED if necessary

## 2017-04-11 NOTE — ED PROVIDER NOTE - PROGRESS NOTE DETAILS
Dr. Sahu Note: s/o from night attending pending psych consult.  Psychiatrist saw patient, no criteria for inpt, not psychotic, just lost job and trouble sleeping, stable for dc home.

## 2017-04-11 NOTE — ED ADULT NURSE REASSESSMENT NOTE - NS ED NURSE REASSESS COMMENT FT1
Pt reassessed 11:30. Pt states he feels better after sleeping. No more auditory hallucinations but when he closes his eyes he sees "colors mixing, cars and drugs in the road and i'm looking down from an airplane."  Pt denies any SI/HI, pain. Pt seen by psychiatrist and discharged by another RN.

## 2017-04-11 NOTE — ED BEHAVIORAL HEALTH ASSESSMENT NOTE - CURRENT MEDICATION
Ativan 0.5 mg BID  Ondansetron 4 mg TID prn  Augmentin 875/125 q 12 hrs  Ranitidine 150 mg BID  Triamterene/HCL 37.5/25 daily

## 2017-05-15 ENCOUNTER — APPOINTMENT (OUTPATIENT)
Dept: OTOLARYNGOLOGY | Facility: CLINIC | Age: 42
End: 2017-05-15

## 2017-05-17 ENCOUNTER — EMERGENCY (EMERGENCY)
Facility: HOSPITAL | Age: 42
LOS: 0 days | Discharge: HOME | End: 2017-05-17
Attending: EMERGENCY MEDICINE | Admitting: EMERGENCY MEDICINE
Payer: MEDICAID

## 2017-05-17 VITALS
DIASTOLIC BLOOD PRESSURE: 98 MMHG | RESPIRATION RATE: 17 BRPM | SYSTOLIC BLOOD PRESSURE: 141 MMHG | TEMPERATURE: 98 F | OXYGEN SATURATION: 98 % | HEART RATE: 80 BPM

## 2017-05-17 DIAGNOSIS — K29.00 ACUTE GASTRITIS WITHOUT BLEEDING: ICD-10-CM

## 2017-05-17 DIAGNOSIS — I10 ESSENTIAL (PRIMARY) HYPERTENSION: ICD-10-CM

## 2017-05-17 DIAGNOSIS — R11.10 VOMITING, UNSPECIFIED: ICD-10-CM

## 2017-05-17 DIAGNOSIS — E78.5 HYPERLIPIDEMIA, UNSPECIFIED: ICD-10-CM

## 2017-05-17 LAB
ALBUMIN SERPL ELPH-MCNC: 4.1 G/DL — SIGNIFICANT CHANGE UP (ref 3.3–5)
ALP SERPL-CCNC: 93 U/L — SIGNIFICANT CHANGE UP (ref 40–120)
ALT FLD-CCNC: 94 U/L — HIGH (ref 12–78)
ANION GAP SERPL CALC-SCNC: 9 MMOL/L — SIGNIFICANT CHANGE UP (ref 5–17)
APTT BLD: 37.9 SEC — HIGH (ref 27.5–37.4)
AST SERPL-CCNC: 38 U/L — HIGH (ref 15–37)
BASOPHILS # BLD AUTO: 0.1 K/UL — SIGNIFICANT CHANGE UP (ref 0–0.2)
BASOPHILS NFR BLD AUTO: 1.2 % — SIGNIFICANT CHANGE UP (ref 0–2)
BILIRUB SERPL-MCNC: 0.5 MG/DL — SIGNIFICANT CHANGE UP (ref 0.2–1.2)
BUN SERPL-MCNC: 9 MG/DL — SIGNIFICANT CHANGE UP (ref 7–23)
CALCIUM SERPL-MCNC: 9.4 MG/DL — SIGNIFICANT CHANGE UP (ref 8.5–10.1)
CHLORIDE SERPL-SCNC: 106 MMOL/L — SIGNIFICANT CHANGE UP (ref 96–108)
CO2 SERPL-SCNC: 26 MMOL/L — SIGNIFICANT CHANGE UP (ref 22–31)
CREAT SERPL-MCNC: 0.88 MG/DL — SIGNIFICANT CHANGE UP (ref 0.5–1.3)
EOSINOPHIL # BLD AUTO: 0.3 K/UL — SIGNIFICANT CHANGE UP (ref 0–0.5)
EOSINOPHIL NFR BLD AUTO: 5 % — SIGNIFICANT CHANGE UP (ref 0–6)
GLUCOSE SERPL-MCNC: 112 MG/DL — HIGH (ref 70–99)
HCT VFR BLD CALC: 52.5 % — HIGH (ref 39–50)
HGB BLD-MCNC: 17.7 G/DL — HIGH (ref 13–17)
INR BLD: 1.09 RATIO — SIGNIFICANT CHANGE UP (ref 0.88–1.16)
LIDOCAIN IGE QN: 116 U/L — SIGNIFICANT CHANGE UP (ref 73–393)
LYMPHOCYTES # BLD AUTO: 1.4 K/UL — SIGNIFICANT CHANGE UP (ref 1–3.3)
LYMPHOCYTES # BLD AUTO: 21.8 % — SIGNIFICANT CHANGE UP (ref 13–44)
MCHC RBC-ENTMCNC: 30.3 PG — SIGNIFICANT CHANGE UP (ref 27–34)
MCHC RBC-ENTMCNC: 33.6 GM/DL — SIGNIFICANT CHANGE UP (ref 32–36)
MCV RBC AUTO: 90.1 FL — SIGNIFICANT CHANGE UP (ref 80–100)
MONOCYTES # BLD AUTO: 0.4 K/UL — SIGNIFICANT CHANGE UP (ref 0–0.9)
MONOCYTES NFR BLD AUTO: 6.3 % — SIGNIFICANT CHANGE UP (ref 2–14)
NEUTROPHILS # BLD AUTO: 4.2 K/UL — SIGNIFICANT CHANGE UP (ref 1.8–7.4)
NEUTROPHILS NFR BLD AUTO: 65.7 % — SIGNIFICANT CHANGE UP (ref 43–77)
PLATELET # BLD AUTO: 304 K/UL — SIGNIFICANT CHANGE UP (ref 150–400)
POTASSIUM SERPL-MCNC: 3.9 MMOL/L — SIGNIFICANT CHANGE UP (ref 3.5–5.3)
POTASSIUM SERPL-SCNC: 3.9 MMOL/L — SIGNIFICANT CHANGE UP (ref 3.5–5.3)
PROT SERPL-MCNC: 7.5 GM/DL — SIGNIFICANT CHANGE UP (ref 6–8.3)
PROTHROM AB SERPL-ACNC: 11.8 SEC — SIGNIFICANT CHANGE UP (ref 9.8–12.7)
RBC # BLD: 5.82 M/UL — HIGH (ref 4.2–5.8)
RBC # FLD: 11.3 % — SIGNIFICANT CHANGE UP (ref 10.3–14.5)
SODIUM SERPL-SCNC: 141 MMOL/L — SIGNIFICANT CHANGE UP (ref 135–145)
WBC # BLD: 6.4 K/UL — SIGNIFICANT CHANGE UP (ref 3.8–10.5)
WBC # FLD AUTO: 6.4 K/UL — SIGNIFICANT CHANGE UP (ref 3.8–10.5)

## 2017-05-17 PROCEDURE — 99284 EMERGENCY DEPT VISIT MOD MDM: CPT

## 2017-05-17 PROCEDURE — 71010: CPT | Mod: 26

## 2017-05-17 RX ORDER — SODIUM CHLORIDE 9 MG/ML
1000 INJECTION INTRAMUSCULAR; INTRAVENOUS; SUBCUTANEOUS ONCE
Qty: 0 | Refills: 0 | Status: COMPLETED | OUTPATIENT
Start: 2017-05-17 | End: 2017-05-17

## 2017-05-17 RX ORDER — FAMOTIDINE 10 MG/ML
20 INJECTION INTRAVENOUS ONCE
Qty: 0 | Refills: 0 | Status: COMPLETED | OUTPATIENT
Start: 2017-05-17 | End: 2017-05-17

## 2017-05-17 RX ADMIN — SODIUM CHLORIDE 2000 MILLILITER(S): 9 INJECTION INTRAMUSCULAR; INTRAVENOUS; SUBCUTANEOUS at 08:50

## 2017-05-17 RX ADMIN — Medication 30 MILLILITER(S): at 11:21

## 2017-05-17 RX ADMIN — FAMOTIDINE 20 MILLIGRAM(S): 10 INJECTION INTRAVENOUS at 09:50

## 2017-05-17 NOTE — ED PROVIDER NOTE - OBJECTIVE STATEMENT
41M c/o upper abodminal pain and vomiting, difficulty tolerating PO x5 days. Denies fever or change in stools. PMH gastritis, HLD, HTN. Denies cardiac hx. Pain is aching, no radiation, worsened by milk. not improved by anything. PMD Sandra Calvo. Pt declines  services.

## 2017-05-17 NOTE — ED ADULT NURSE NOTE - OBJECTIVE STATEMENT
Pt c/o vomiting x 3 days, pt states hx of same which resolved.  Pt reports he has not been able to tolerate food or liquids x 3 days.  Pt is a/ox3, appears anxious.

## 2017-05-17 NOTE — ED PROVIDER NOTE - PROGRESS NOTE DETAILS
Pt feeling much better after IV pepcid and maalox. No significant findings on cxr or labs. Likely gastritis. Recommend outpt GI f/u and OTC antacids in addition to pts own ranitidine. Pt feeling well, agrees with DC and plan of care. Pt given copy of lab results. signed Gabriella Cordero PA-C

## 2017-05-17 NOTE — ED PROVIDER NOTE - MEDICAL DECISION MAKING DETAILS
signed Gabriella Cordero PA-C   upper abdominal pain, nontender. Plan labs, cxr, pepcid, maalox. reassess.

## 2017-05-17 NOTE — ED PROVIDER NOTE - PMH
Gastritis without bleeding, unspecified chronicity, unspecified gastritis type    HTN (hypertension)    Hyperlipidemia, unspecified hyperlipidemia type

## 2017-05-17 NOTE — ED PROVIDER NOTE - ATTENDING CONTRIBUTION TO CARE
I personally saw the patient with the PA, and completed the key components of the history and physical exam. I then discussed the management plan with the PA. I personally saw the patient with the PA, and completed the key components of the history and physical exam. I then discussed the management plan with the PA.    41M c/o upper abodminal pain and vomiting, difficulty tolerating PO x5 days. Pt well appearing with benign exam. Labs, PPI, maalox, IVF, reassess

## 2017-06-19 ENCOUNTER — OUTPATIENT (OUTPATIENT)
Dept: OUTPATIENT SERVICES | Facility: HOSPITAL | Age: 42
LOS: 1 days | Discharge: HOME | End: 2017-06-19

## 2017-06-19 ENCOUNTER — APPOINTMENT (OUTPATIENT)
Dept: OTOLARYNGOLOGY | Facility: CLINIC | Age: 42
End: 2017-06-19

## 2017-06-19 VITALS
SYSTOLIC BLOOD PRESSURE: 132 MMHG | HEART RATE: 111 BPM | DIASTOLIC BLOOD PRESSURE: 94 MMHG | HEIGHT: 66 IN | BODY MASS INDEX: 23.3 KG/M2 | WEIGHT: 145 LBS

## 2017-06-19 DIAGNOSIS — R09.89 OTHER SPECIFIED SYMPTOMS AND SIGNS INVOLVING THE CIRCULATORY AND RESPIRATORY SYSTEMS: ICD-10-CM

## 2017-06-19 DIAGNOSIS — R13.10 DYSPHAGIA, UNSPECIFIED: ICD-10-CM

## 2017-06-19 DIAGNOSIS — Z78.9 OTHER SPECIFIED HEALTH STATUS: ICD-10-CM

## 2017-06-19 DIAGNOSIS — Z80.42 FAMILY HISTORY OF MALIGNANT NEOPLASM OF PROSTATE: ICD-10-CM

## 2017-06-19 DIAGNOSIS — Z87.891 PERSONAL HISTORY OF NICOTINE DEPENDENCE: ICD-10-CM

## 2017-06-19 DIAGNOSIS — R07.0 PAIN IN THROAT: ICD-10-CM

## 2017-06-19 DIAGNOSIS — Z86.79 PERSONAL HISTORY OF OTHER DISEASES OF THE CIRCULATORY SYSTEM: ICD-10-CM

## 2017-06-20 PROBLEM — R13.10 DYSPHAGIA: Status: ACTIVE | Noted: 2017-06-19

## 2017-06-20 PROBLEM — R07.0 THROAT DISCOMFORT: Status: ACTIVE | Noted: 2017-06-19

## 2017-06-21 ENCOUNTER — RESULT REVIEW (OUTPATIENT)
Age: 42
End: 2017-06-21

## 2017-06-21 ENCOUNTER — OUTPATIENT (OUTPATIENT)
Dept: OUTPATIENT SERVICES | Facility: HOSPITAL | Age: 42
LOS: 1 days | Discharge: HOME | End: 2017-06-21
Payer: SUBSIDIZED

## 2017-06-21 VITALS
OXYGEN SATURATION: 98 % | HEART RATE: 71 BPM | TEMPERATURE: 97 F | RESPIRATION RATE: 11 BRPM | WEIGHT: 145.06 LBS | HEIGHT: 66 IN | DIASTOLIC BLOOD PRESSURE: 97 MMHG | SYSTOLIC BLOOD PRESSURE: 141 MMHG

## 2017-06-21 DIAGNOSIS — Z98.890 OTHER SPECIFIED POSTPROCEDURAL STATES: Chronic | ICD-10-CM

## 2017-06-21 DIAGNOSIS — R13.10 DYSPHAGIA, UNSPECIFIED: ICD-10-CM

## 2017-06-21 DIAGNOSIS — R07.0 PAIN IN THROAT: ICD-10-CM

## 2017-06-21 PROCEDURE — 88305 TISSUE EXAM BY PATHOLOGIST: CPT | Mod: 26

## 2017-06-21 PROCEDURE — 88312 SPECIAL STAINS GROUP 1: CPT | Mod: 26

## 2017-06-21 PROCEDURE — 88313 SPECIAL STAINS GROUP 2: CPT | Mod: 26

## 2017-06-21 RX ORDER — ESCITALOPRAM OXALATE 10 MG/1
1 TABLET, FILM COATED ORAL
Qty: 0 | Refills: 0 | COMMUNITY

## 2017-06-21 RX ORDER — AMLODIPINE BESYLATE 2.5 MG/1
1 TABLET ORAL
Qty: 0 | Refills: 0 | COMMUNITY

## 2017-06-21 RX ORDER — SODIUM CHLORIDE 9 MG/ML
1000 INJECTION INTRAMUSCULAR; INTRAVENOUS; SUBCUTANEOUS
Qty: 0 | Refills: 0 | Status: DISCONTINUED | OUTPATIENT
Start: 2017-06-21 | End: 2017-07-06

## 2017-06-21 RX ADMIN — SODIUM CHLORIDE 75 MILLILITER(S): 9 INJECTION INTRAMUSCULAR; INTRAVENOUS; SUBCUTANEOUS at 11:12

## 2017-06-21 RX ADMIN — SODIUM CHLORIDE 75 MILLILITER(S): 9 INJECTION INTRAMUSCULAR; INTRAVENOUS; SUBCUTANEOUS at 11:24

## 2017-06-22 LAB — SURGICAL PATHOLOGY FINAL REPORT - CH: SIGNIFICANT CHANGE UP

## 2017-06-28 DIAGNOSIS — F41.9 ANXIETY DISORDER, UNSPECIFIED: ICD-10-CM

## 2017-06-28 DIAGNOSIS — K21.0 GASTRO-ESOPHAGEAL REFLUX DISEASE WITH ESOPHAGITIS: ICD-10-CM

## 2017-06-28 DIAGNOSIS — K21.9 GASTRO-ESOPHAGEAL REFLUX DISEASE WITHOUT ESOPHAGITIS: ICD-10-CM

## 2017-06-28 DIAGNOSIS — K29.50 UNSPECIFIED CHRONIC GASTRITIS WITHOUT BLEEDING: ICD-10-CM

## 2017-06-28 DIAGNOSIS — I10 ESSENTIAL (PRIMARY) HYPERTENSION: ICD-10-CM

## 2017-09-24 ENCOUNTER — INPATIENT (INPATIENT)
Facility: HOSPITAL | Age: 42
LOS: 0 days | Discharge: HOME | End: 2017-09-25
Attending: INTERNAL MEDICINE | Admitting: INTERNAL MEDICINE
Payer: MEDICAID

## 2017-09-24 VITALS
DIASTOLIC BLOOD PRESSURE: 105 MMHG | HEART RATE: 122 BPM | RESPIRATION RATE: 22 BRPM | TEMPERATURE: 99 F | OXYGEN SATURATION: 97 % | SYSTOLIC BLOOD PRESSURE: 180 MMHG | WEIGHT: 145.95 LBS

## 2017-09-24 DIAGNOSIS — I10 ESSENTIAL (PRIMARY) HYPERTENSION: ICD-10-CM

## 2017-09-24 DIAGNOSIS — R07.2 PRECORDIAL PAIN: ICD-10-CM

## 2017-09-24 DIAGNOSIS — R94.31 ABNORMAL ELECTROCARDIOGRAM [ECG] [EKG]: ICD-10-CM

## 2017-09-24 DIAGNOSIS — Z98.890 OTHER SPECIFIED POSTPROCEDURAL STATES: Chronic | ICD-10-CM

## 2017-09-24 LAB
ALBUMIN SERPL ELPH-MCNC: 4 G/DL — SIGNIFICANT CHANGE UP (ref 3.3–5)
ALP SERPL-CCNC: 99 U/L — SIGNIFICANT CHANGE UP (ref 40–120)
ALT FLD-CCNC: 31 U/L — SIGNIFICANT CHANGE UP (ref 12–78)
ANION GAP SERPL CALC-SCNC: 5 MMOL/L — SIGNIFICANT CHANGE UP (ref 5–17)
APTT BLD: 35.6 SEC — SIGNIFICANT CHANGE UP (ref 27.5–37.4)
AST SERPL-CCNC: 28 U/L — SIGNIFICANT CHANGE UP (ref 15–37)
BASOPHILS # BLD AUTO: 0.1 K/UL — SIGNIFICANT CHANGE UP (ref 0–0.2)
BASOPHILS NFR BLD AUTO: 0.9 % — SIGNIFICANT CHANGE UP (ref 0–2)
BILIRUB SERPL-MCNC: 0.4 MG/DL — SIGNIFICANT CHANGE UP (ref 0.2–1.2)
BUN SERPL-MCNC: 14 MG/DL — SIGNIFICANT CHANGE UP (ref 7–23)
CALCIUM SERPL-MCNC: 8.6 MG/DL — SIGNIFICANT CHANGE UP (ref 8.5–10.1)
CHLORIDE SERPL-SCNC: 105 MMOL/L — SIGNIFICANT CHANGE UP (ref 96–108)
CK SERPL-CCNC: 347 U/L — HIGH (ref 26–308)
CO2 SERPL-SCNC: 28 MMOL/L — SIGNIFICANT CHANGE UP (ref 22–31)
CREAT SERPL-MCNC: 1.19 MG/DL — SIGNIFICANT CHANGE UP (ref 0.5–1.3)
EOSINOPHIL # BLD AUTO: 0.4 K/UL — SIGNIFICANT CHANGE UP (ref 0–0.5)
EOSINOPHIL NFR BLD AUTO: 4.5 % — SIGNIFICANT CHANGE UP (ref 0–6)
GLUCOSE SERPL-MCNC: 142 MG/DL — HIGH (ref 70–99)
HCT VFR BLD CALC: 46.1 % — SIGNIFICANT CHANGE UP (ref 39–50)
HGB BLD-MCNC: 16.1 G/DL — SIGNIFICANT CHANGE UP (ref 13–17)
INR BLD: 1.09 RATIO — SIGNIFICANT CHANGE UP (ref 0.88–1.16)
LYMPHOCYTES # BLD AUTO: 1.7 K/UL — SIGNIFICANT CHANGE UP (ref 1–3.3)
LYMPHOCYTES # BLD AUTO: 20.7 % — SIGNIFICANT CHANGE UP (ref 13–44)
MCHC RBC-ENTMCNC: 30.8 PG — SIGNIFICANT CHANGE UP (ref 27–34)
MCHC RBC-ENTMCNC: 35 GM/DL — SIGNIFICANT CHANGE UP (ref 32–36)
MCV RBC AUTO: 88 FL — SIGNIFICANT CHANGE UP (ref 80–100)
MONOCYTES # BLD AUTO: 0.5 K/UL — SIGNIFICANT CHANGE UP (ref 0–0.9)
MONOCYTES NFR BLD AUTO: 6.5 % — SIGNIFICANT CHANGE UP (ref 2–14)
NEUTROPHILS # BLD AUTO: 5.4 K/UL — SIGNIFICANT CHANGE UP (ref 1.8–7.4)
NEUTROPHILS NFR BLD AUTO: 67.5 % — SIGNIFICANT CHANGE UP (ref 43–77)
NT-PROBNP SERPL-SCNC: 16 PG/ML — SIGNIFICANT CHANGE UP (ref 0–125)
PLATELET # BLD AUTO: 251 K/UL — SIGNIFICANT CHANGE UP (ref 150–400)
POTASSIUM SERPL-MCNC: 3.6 MMOL/L — SIGNIFICANT CHANGE UP (ref 3.5–5.3)
POTASSIUM SERPL-SCNC: 3.6 MMOL/L — SIGNIFICANT CHANGE UP (ref 3.5–5.3)
PROT SERPL-MCNC: 7.5 GM/DL — SIGNIFICANT CHANGE UP (ref 6–8.3)
PROTHROM AB SERPL-ACNC: 11.8 SEC — SIGNIFICANT CHANGE UP (ref 9.8–12.7)
RBC # BLD: 5.24 M/UL — SIGNIFICANT CHANGE UP (ref 4.2–5.8)
RBC # FLD: 11.1 % — SIGNIFICANT CHANGE UP (ref 10.3–14.5)
SODIUM SERPL-SCNC: 138 MMOL/L — SIGNIFICANT CHANGE UP (ref 135–145)
TROPONIN I SERPL-MCNC: <0.015 NG/ML — SIGNIFICANT CHANGE UP (ref 0.01–0.04)
WBC # BLD: 8 K/UL — SIGNIFICANT CHANGE UP (ref 3.8–10.5)
WBC # FLD AUTO: 8 K/UL — SIGNIFICANT CHANGE UP (ref 3.8–10.5)

## 2017-09-24 PROCEDURE — 93458 L HRT ARTERY/VENTRICLE ANGIO: CPT | Mod: 26

## 2017-09-24 PROCEDURE — 99223 1ST HOSP IP/OBS HIGH 75: CPT | Mod: 25

## 2017-09-24 PROCEDURE — 93010 ELECTROCARDIOGRAM REPORT: CPT

## 2017-09-24 PROCEDURE — 71020: CPT | Mod: 26

## 2017-09-24 RX ORDER — RANITIDINE HYDROCHLORIDE 150 MG/1
1 TABLET, FILM COATED ORAL
Qty: 0 | Refills: 0 | COMMUNITY

## 2017-09-24 RX ORDER — LABETALOL HCL 100 MG
10 TABLET ORAL ONCE
Qty: 0 | Refills: 0 | Status: COMPLETED | OUTPATIENT
Start: 2017-09-24 | End: 2017-09-24

## 2017-09-24 RX ORDER — NITROGLYCERIN 6.5 MG
0.4 CAPSULE, EXTENDED RELEASE ORAL ONCE
Qty: 0 | Refills: 0 | Status: COMPLETED | OUTPATIENT
Start: 2017-09-24 | End: 2017-09-24

## 2017-09-24 RX ORDER — SODIUM CHLORIDE 9 MG/ML
2000 INJECTION INTRAMUSCULAR; INTRAVENOUS; SUBCUTANEOUS ONCE
Qty: 0 | Refills: 0 | Status: COMPLETED | OUTPATIENT
Start: 2017-09-24 | End: 2017-09-24

## 2017-09-24 RX ORDER — ESCITALOPRAM OXALATE 10 MG/1
10 TABLET, FILM COATED ORAL DAILY
Qty: 0 | Refills: 0 | Status: DISCONTINUED | OUTPATIENT
Start: 2017-09-24 | End: 2017-09-25

## 2017-09-24 RX ORDER — ATENOLOL 25 MG/1
25 TABLET ORAL DAILY
Qty: 0 | Refills: 0 | Status: DISCONTINUED | OUTPATIENT
Start: 2017-09-24 | End: 2017-09-25

## 2017-09-24 RX ORDER — INFLUENZA VIRUS VACCINE 15; 15; 15; 15 UG/.5ML; UG/.5ML; UG/.5ML; UG/.5ML
0.5 SUSPENSION INTRAMUSCULAR ONCE
Qty: 0 | Refills: 0 | Status: COMPLETED | OUTPATIENT
Start: 2017-09-24 | End: 2017-09-25

## 2017-09-24 RX ORDER — SODIUM CHLORIDE 9 MG/ML
3 INJECTION INTRAMUSCULAR; INTRAVENOUS; SUBCUTANEOUS ONCE
Qty: 0 | Refills: 0 | Status: COMPLETED | OUTPATIENT
Start: 2017-09-24 | End: 2017-09-24

## 2017-09-24 RX ORDER — SODIUM CHLORIDE 9 MG/ML
1000 INJECTION INTRAMUSCULAR; INTRAVENOUS; SUBCUTANEOUS
Qty: 0 | Refills: 0 | Status: DISCONTINUED | OUTPATIENT
Start: 2017-09-24 | End: 2017-09-25

## 2017-09-24 RX ORDER — HEPARIN SODIUM 5000 [USP'U]/ML
5000 INJECTION INTRAVENOUS; SUBCUTANEOUS EVERY 8 HOURS
Qty: 0 | Refills: 0 | Status: DISCONTINUED | OUTPATIENT
Start: 2017-09-24 | End: 2017-09-25

## 2017-09-24 RX ORDER — ONDANSETRON 8 MG/1
4 TABLET, FILM COATED ORAL EVERY 6 HOURS
Qty: 0 | Refills: 0 | Status: DISCONTINUED | OUTPATIENT
Start: 2017-09-24 | End: 2017-09-25

## 2017-09-24 RX ORDER — AMLODIPINE BESYLATE 2.5 MG/1
5 TABLET ORAL DAILY
Qty: 0 | Refills: 0 | Status: DISCONTINUED | OUTPATIENT
Start: 2017-09-24 | End: 2017-09-25

## 2017-09-24 RX ORDER — SODIUM CHLORIDE 9 MG/ML
1000 INJECTION INTRAMUSCULAR; INTRAVENOUS; SUBCUTANEOUS ONCE
Qty: 0 | Refills: 0 | Status: DISCONTINUED | OUTPATIENT
Start: 2017-09-24 | End: 2017-09-25

## 2017-09-24 RX ORDER — ONDANSETRON 8 MG/1
4 TABLET, FILM COATED ORAL ONCE
Qty: 0 | Refills: 0 | Status: COMPLETED | OUTPATIENT
Start: 2017-09-24 | End: 2017-09-24

## 2017-09-24 RX ORDER — PANTOPRAZOLE SODIUM 20 MG/1
40 TABLET, DELAYED RELEASE ORAL
Qty: 0 | Refills: 0 | Status: DISCONTINUED | OUTPATIENT
Start: 2017-09-24 | End: 2017-09-25

## 2017-09-24 RX ADMIN — SODIUM CHLORIDE 80 MILLILITER(S): 9 INJECTION INTRAMUSCULAR; INTRAVENOUS; SUBCUTANEOUS at 23:10

## 2017-09-24 RX ADMIN — ONDANSETRON 4 MILLIGRAM(S): 8 TABLET, FILM COATED ORAL at 18:45

## 2017-09-24 RX ADMIN — Medication 10 MILLIGRAM(S): at 18:45

## 2017-09-24 RX ADMIN — SODIUM CHLORIDE 3 MILLILITER(S): 9 INJECTION INTRAMUSCULAR; INTRAVENOUS; SUBCUTANEOUS at 17:29

## 2017-09-24 RX ADMIN — SODIUM CHLORIDE 1000 MILLILITER(S): 9 INJECTION INTRAMUSCULAR; INTRAVENOUS; SUBCUTANEOUS at 18:45

## 2017-09-24 RX ADMIN — Medication 0.4 MILLIGRAM(S): at 19:21

## 2017-09-24 NOTE — PROCEDURE NOTE - PROCEDURE
<<-----Click on this checkbox to enter Procedure Cardiac catheterization  09/24/2017    Active  KADE

## 2017-09-24 NOTE — ED ADULT NURSE NOTE - OBJECTIVE STATEMENT
Pt states he began to have chest pain at 1300 today while at work. Pt states he began to vomit after work at approx 1530. Pt color flushed, c/o intermittent chest pain. Pt put on cardiac monitor upon admit to floor, EKG done, SL inserted and labs drawn. Pt has not vomited upon admit to ED.

## 2017-09-24 NOTE — H&P ADULT - NSHPLABSRESULTS_GEN_ALL_CORE
CARDIAC MARKERS ( 24 Sep 2017 17:51 )  <0.015 ng/mL / x     / 347 U/L / x     / x                                16.1   8.0   )-----------( 251      ( 24 Sep 2017 17:51 )             46.1     24 Sep 2017 17:51    138    |  105    |  14     ----------------------------<  142    3.6     |  28     |  1.19     Ca    8.6        24 Sep 2017 17:51    TPro  7.5    /  Alb  4.0    /  TBili  0.4    /  DBili  x      /  AST  28     /  ALT  31     /  AlkPhos  99     24 Sep 2017 17:51    PT/INR - ( 24 Sep 2017 17:51 )   PT: 11.8 sec;   INR: 1.09 ratio         PTT - ( 24 Sep 2017 17:51 )  PTT:35.6 sec  CAPILLARY BLOOD GLUCOSE        LIVER FUNCTIONS - ( 24 Sep 2017 17:51 )  Alb: 4.0 g/dL / Pro: 7.5 gm/dL / ALK PHOS: 99 U/L / ALT: 31 U/L / AST: 28 U/L / GGT: x

## 2017-09-24 NOTE — ED PROVIDER NOTE - PROGRESS NOTE DETAILS
Pt complaining of nausea with few episodes of vomiting states that CP is improved, but still having some left sided CP. Repeat EKG obtained, continues to show similar findings of previous EKG, which are Peak Ts in V2 and V3 and slight depressions in inferior leads. Initial troponin was negative. EKGs faxed to Dr. Dejesus, cardiology on call for interventional PCI. Pt complaining of nausea with few episodes of vomiting states that CP is improved, but still having some left sided CP. Repeat EKG obtained, continues to show similar findings of previous EKG, which are Peak Ts in V2 and V3 and slight depressions in inferior leads. Initial troponin was negative. EKGs faxed to Dr. Dejesus, cardiology on call, who notes potential evolving ischemic changes, agrees with plan at this time for PCI consultation. Pt complaining of nausea with few episodes of vomiting states that CP is improved, but still having some left sided CP. Repeat EKG obtained, continues to show similar findings of previous EKG, which are Peak Ts in V2 and V3 and slight depressions in inferior leads. possible evolution of Ts on precordial leads. Initial troponin was negative. EKGs faxed to Dr. Dejesus, cardiology on call, who notes potential evolving ischemic changes, agrees with plan at this time for PCI consultation.

## 2017-09-24 NOTE — H&P ADULT - ASSESSMENT
40yo male a/w chest pain    # Chest Pain  - afebrile, HD stable, comfortable, currently chest pain free  - s/p cath, with normal coronaries  - ECHO  - BP control  - check lipid panel, HgbA1C  - follow up cardiology  - cont with Atenolol    # HTN  - Norvasc  - Atenolol    # Depression  - Lexapro    # DVt ppx, HSQ    # Admit, stable

## 2017-09-24 NOTE — ED PROVIDER NOTE - MUSCULOSKELETAL, MLM
Spine appears normal, range of motion is not limited, no muscle or joint tenderness Spine appears normal, range of motion is not limited, no muscle or joint tenderness. No LE edema

## 2017-09-24 NOTE — ED PROVIDER NOTE - OBJECTIVE STATEMENT
40 y/o male with PMHx of HTN, HLD, gastritis presents to ED c/o 40 y/o male with PMHx of HTN, HLD, gastritis presents to ED c/o. Pt took one dose  mg. 40 y/o male with PMHx of HTN, HLD, gastritis presents to ED c/o. +CP. +N/V. Pt works in construction. Pt took one dose  mg. 40 y/o male with PMHx of HTN, HLD, gastritis presents to ED c/o chest pain since 2 PM. 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 +CP. +N/V. Pt took one dose  mg. Pt states his heart began to "jump: around 2 pm. Pt went home and began to vomit. Pt states sx occurred before, was in ED a few months ago and was told they are stress related.    40 y/o male with chronic left sided chest pain evaluated 3 months ago at [facility]. 42 y/o male with PMHx of HTN, HLD, gastritis, and chronic left sided chest pain evaluated 3 months ago at [facility] presents to ED c/o chest pain since 2 PM. 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 +CP. +N/V. Pt took one dose  mg. Pt states his heart began to "jump: around 2 pm. Pt went home and began to vomit. Pt states sx occurred before, was in ED a few months ago and was told sx are stress related

## 2017-09-24 NOTE — PATIENT PROFILE ADULT. - LANGUAGE ASSISTANCE NEEDED
patient able to speal english well and able to make needs known/No-Patient/Caregiver offered and refused free interpretation services.

## 2017-09-24 NOTE — ED ADULT NURSE REASSESSMENT NOTE - NS ED NURSE REASSESS COMMENT FT1
At 1820 pt began to vomit and c/o increased chest pain. MD in room EKG done. Pt given Labetalol and Zofran for vomiting. (see VS). Pt states chest pain subsiding. PCI called by Dr Haynes

## 2017-09-24 NOTE — CONSULT NOTE ADULT - ATTENDING COMMENTS
Risks, benefits, and alternatives of cardiac catheterization with percutaneous coronary intervention discussed with the patient including but not limited to death, myocardial infarction, stroke, bleeding, infection, or vascular injury. Patient expressed understanding of these risks and signed informed consent for procedure.

## 2017-09-24 NOTE — ED PROVIDER NOTE - NOTES
Discuss pt with Dr. Salmeron with findings of depressions on EKG and ongoing discomfort with episodes of vomiting. Dr. Salmeron wishes to take pt to cath lab, will come to hospital, cath lab to be notified. Discuss pt with Dr. Salmeron with findings of depressions on EKG, ?hyperacute T waves, ?evolving changes and ongoing discomfort with episodes of vomiting. Dr. Salmeron does not want to see EKG, will see in hospital, wishes to take pt to cath lab, cath lab to be notified.

## 2017-09-24 NOTE — H&P ADULT - NSHPREVIEWOFSYSTEMS_GEN_ALL_CORE
(-)Fever, chills, cough,  sob, headache, dizziness, palpitations, abd pain,  leg swelling  (+)chest pain, N/V, palpitations

## 2017-09-24 NOTE — CONSULT NOTE ADULT - PROBLEM SELECTOR RECOMMENDATION 9
Atypical in description but abnormal ECG with mildly dynamic changes with chest pain.  Cardiac Catheterization +/- PCI for further evaluation/intervention.

## 2017-09-24 NOTE — ED PROVIDER NOTE - CARDIAC, MLM
Normal rate, regular rhythm.  Heart sounds S1, S2.  No murmurs, rubs or gallops. Normal rate, regular rhythm.  Heart sounds S1, S2.  No murmurs, rubs or gallops. 2+ rad pulses. Normal rate, regular rhythm.  Heart sounds S1, S2.  No murmurs, rubs or gallops. 2+ rad pulses, 2+ DP pulse.

## 2017-09-24 NOTE — CONSULT NOTE ADULT - SUBJECTIVE AND OBJECTIVE BOX
PCP: Abdi Ambrose	    REASON FOR CONSULT: Chest pain, Abnormal ECG    CHIEF COMPLAINT: Chest pain    HPI: 42 y/o male with PMHx of HTN, HLD, gastritis, and chronic left sided chest pain since his gastritis was diagnosed, presents now to ED c/o chest pain, center of chest, tightness and burning, since 2 PM. 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 Had episodes last night as well but tonight's were more forceful. +CP. +N/V. Pt took one dose  mg. Pt states his heart began to "jump: around 2 pm. Pt went home and began to vomit. Pt states sx occurred before, was in ED a few months ago and was told sx are stress related  PAST MEDICAL/SURGICAL HISTORY:  PAST MEDICAL & SURGICAL HISTORY:  Gastritis without bleeding, unspecified chronicity, unspecified gastritis type  Hyperlipidemia, unspecified hyperlipidemia type  HTN (hypertension)  Status post shoulder surgery: left  HEALTH ISSUES - PROBLEM Dx:          SOCIAL HISTORY: Non-Smoker/Social ETOH/ No Ilicit Drug use.    FAMILY HISTORY:   FAMILY HISTORY:  No pertinent family history in first degree relatives      ALLERGIES:  Allergies    No Known Allergies    Intolerances        HOME MEDICATIONS:    REVIEW OF SYSTEMS: 13 systems were reviewed and all negative except for comments above.    Vital Signs Last 24 Hrs  T(C): 37.3 (24 Sep 2017 19:00), Max: 37.4 (24 Sep 2017 17:53)  T(F): 99.1 (24 Sep 2017 19:00), Max: 99.4 (24 Sep 2017 17:53)  HR: 84 (24 Sep 2017 19:02) (84 - 127)  BP: 154/109 (24 Sep 2017 19:02) (154/109 - 186/111)  BP(mean): --  RR: 17 (24 Sep 2017 19:02) (16 - 28)  SpO2: 99% (24 Sep 2017 19:02) (97% - 99%)Daily     Daily I&O's Summary    24 Sep 2017 07:01  -  24 Sep 2017 20:24  --------------------------------------------------------  IN: 2000 mL / OUT: 0 mL / NET: 2000 mL        PHYSICAL EXAM:    Constitutional: NAD, awake and alert, well-developed  HEENT: PERRLA, EOMI,  No oral cyananosis. Oropharnx Clean and Dry.  Neck:  supple,  No JVD, No Thyroid enlargement. No Carotid Bruits bilaterally.  Respiratory: Breath sounds are clear bilaterally, No wheezing, rales or rhonchi  Cardiovascular: NL S1 and S2, RRR, no Murmurs, gallops or rubs  Gastrointestinal: Bowel Sounds present, soft, NT, ND, No HSM.   Extremities: No peripheral edema. No clubbing or cyanosis. Morro Test performed in R+L UE and Negative.  Vascular: 2+ peripheral pulses in LE   Neurological: A/O x 3, no focal motor or sensory deficits  Musculoskeletal: no calf tenderness or joint swelling.  Skin: No rashes or lessions.      RADIOLOGY/EKG:      ECHO/CARDIAC CATHTERIZATION/STRESS TEST:    Risks, benefits, and alternatives of cardiac catheterization with percutaneous coronary intervention discussed with the patient/family including but not limited to death, myocardial infarction, stroke, bleeding, infection, or vascular injury. Patient expressed understanding of these risks and signed informed consent for procedure. PCP: Abdi Ambrose	    REASON FOR CONSULT: Chest pain, Abnormal ECG    CHIEF COMPLAINT: Chest pain    HPI: 40 y/o male with PMHx of HTN, HLD, gastritis, and chronic left sided chest pain since his gastritis was diagnosed, presents now to ED c/o chest pain, center of chest, tightness and burning, since 2 PM. 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 Had episodes last night as well but tonight's were more forceful. Also had associated Nausea. Pt took one dose  mg. Did also had one episode of vomiting at home. ECG in ER initially NSR, LVH with strain. Repeat done after with recurrence of chest pain suggested some ST elevation in V leads with reciprocal ST depressions inferiorly.    PAST MEDICAL & SURGICAL HISTORY:  Gastritis without bleeding, unspecified chronicity, unspecified gastritis type  Hyperlipidemia, unspecified hyperlipidemia type  HTN (hypertension)  Status post shoulder surgery: left    SOCIAL HISTORY: Former Smoker/Social ETOH/ No Ilicit Drug use.    FAMILY HISTORY:  No pertinent cardiac family history in first degree relatives    Allergies: No Known Allergies    HOME MEDICATIONS: 2 BP meds (does not know names).    REVIEW OF SYSTEMS: 13 systems were reviewed and all negative except for comments above.    Vital Signs Last 24 Hrs  T(C): 37.3 (24 Sep 2017 19:00), Max: 37.4 (24 Sep 2017 17:53)  T(F): 99.1 (24 Sep 2017 19:00), Max: 99.4 (24 Sep 2017 17:53)  HR: 84 (24 Sep 2017 19:02) (84 - 127)  BP: 154/109 (24 Sep 2017 19:02) (154/109 - 186/111)  BP(mean): --  RR: 17 (24 Sep 2017 19:02) (16 - 28)  SpO2: 99% (24 Sep 2017 19:02) (97% - 99%)Daily     Daily I&O's Summary    24 Sep 2017 07:01  -  24 Sep 2017 20:24  --------------------------------------------------------  IN: 2000 mL / OUT: 0 mL / NET: 2000 mL    PHYSICAL EXAM:  Constitutional: NAD, awake and alert, well-developed  HEENT: PERRLA, EOMI,  No oral cyanosis. Oropharynx Clean and Dry.  Neck:  supple,  No JVD, No Thyroid enlargement. No Carotid Bruits bilaterally.  Respiratory: Breath sounds are clear bilaterally, No wheezing, rales or rhonchi  Cardiovascular: NL S1 and S2, RRR, +s4, No murmurs.  Gastrointestinal: Bowel Sounds present, soft, NT, ND, No HSM.   Extremities: No peripheral edema. No clubbing or cyanosis. Barbeau Test performed in RUE and Negative.  Vascular: 2+ peripheral pulses in LE   Neurological: A/O x 3, no focal motor or sensory deficits  Musculoskeletal: no calf tenderness or joint swelling.  Skin: No rashes.    EKG: #1 NSR, LVH with repolarization abnormality.  #2 NSR, LVH, Repol., 1-2 mm ST elevation v1-V2, Peaked t waves v3, v4. ST depressions upsloping, III, AVF.

## 2017-09-24 NOTE — H&P ADULT - NSHPPHYSICALEXAM_GEN_ALL_CORE
T(C): 37.3 (09-24-17 @ 19:00), Max: 37.4 (09-24-17 @ 17:53)  HR: 84 (09-24-17 @ 19:02) (84 - 127)  BP: 154/109 (09-24-17 @ 19:02) (154/109 - 186/111)  RR: 17 (09-24-17 @ 19:02) (16 - 28)  SpO2: 99% (09-24-17 @ 19:02) (97% - 99%)  Wt(kg): --    Gen: AAOx3, NAD  HEENT: NCAT, EOMI  Neck: Supple  CV: nml S1S2, RRR  Lungs: CTABL  Abd: Soft, NT, ND, BS+  Ext: No edema  Neuro: Non focal

## 2017-09-24 NOTE — ED ADULT TRIAGE NOTE - CHIEF COMPLAINT QUOTE
pt c/p chest pain, nausea, vomitting and HTN that started today two hours ago, pt denies fall or injury, denies fever

## 2017-09-24 NOTE — H&P ADULT - HISTORY OF PRESENT ILLNESS
Patient is 42yo male with PMHx of HTN, Depression, presents with chest pain. Pt reports chest pain started at 1330 today, described as substernal, moderate intensity, non radiating, without alleviating or aggravating factors, associated with nausea, vomiting, and palpitations. In the ER EKG noted with ST segment changes, taken to cath lab urgently, with normal coronaries, no intervention taken. Pt currently reports to be chest pain free. No other constitutional symptoms.

## 2017-09-24 NOTE — PROCEDURE NOTE - SUPERVISORY STATEMENT
Cardiac catheterization performed via radial access without complication.   Results: Non- Obstructive coronary artery disease. Normal left ventricular function.   Plan: Manage with medical therapy.

## 2017-09-24 NOTE — ED PROVIDER NOTE - MEDICAL DECISION MAKING DETAILS
41M with HTN, HLD, gastritis with chest pain beginning while exerting himself outside. Accompanied by palpitations, nausea, vomiting. Initial EKG with ?peaked t waves on precordial leads, no indication for urgent cath consult. without sob, low suspicion PE, does not radiate to back, no neuro sx, low suspicion of dissection. ddx includes acs, also consider GI etiology. pt already received asa. obtain labs including cardiac enzymes, check cxr, treat symptoms as necessary, and re-assess. - Joshua Coley MD

## 2017-09-25 ENCOUNTER — TRANSCRIPTION ENCOUNTER (OUTPATIENT)
Age: 42
End: 2017-09-25

## 2017-09-25 VITALS — RESPIRATION RATE: 10 BRPM | DIASTOLIC BLOOD PRESSURE: 60 MMHG | HEART RATE: 46 BPM | SYSTOLIC BLOOD PRESSURE: 99 MMHG

## 2017-09-25 DIAGNOSIS — E78.5 HYPERLIPIDEMIA, UNSPECIFIED: ICD-10-CM

## 2017-09-25 LAB
ADD ON TEST-SPECIMEN IN LAB: SIGNIFICANT CHANGE UP
ANION GAP SERPL CALC-SCNC: 4 MMOL/L — LOW (ref 5–17)
BASOPHILS # BLD AUTO: 0.1 K/UL — SIGNIFICANT CHANGE UP (ref 0–0.2)
BASOPHILS NFR BLD AUTO: 0.7 % — SIGNIFICANT CHANGE UP (ref 0–2)
BUN SERPL-MCNC: 11 MG/DL — SIGNIFICANT CHANGE UP (ref 7–23)
CALCIUM SERPL-MCNC: 8.6 MG/DL — SIGNIFICANT CHANGE UP (ref 8.5–10.1)
CHLORIDE SERPL-SCNC: 106 MMOL/L — SIGNIFICANT CHANGE UP (ref 96–108)
CHOLEST SERPL-MCNC: 202 MG/DL — HIGH (ref 10–199)
CK SERPL-CCNC: 263 U/L — SIGNIFICANT CHANGE UP (ref 26–308)
CK SERPL-CCNC: 297 U/L — SIGNIFICANT CHANGE UP (ref 26–308)
CO2 SERPL-SCNC: 30 MMOL/L — SIGNIFICANT CHANGE UP (ref 22–31)
CREAT SERPL-MCNC: 0.91 MG/DL — SIGNIFICANT CHANGE UP (ref 0.5–1.3)
EOSINOPHIL # BLD AUTO: 0.7 K/UL — HIGH (ref 0–0.5)
EOSINOPHIL NFR BLD AUTO: 8.3 % — HIGH (ref 0–6)
GLUCOSE SERPL-MCNC: 95 MG/DL — SIGNIFICANT CHANGE UP (ref 70–99)
HCT VFR BLD CALC: 43.7 % — SIGNIFICANT CHANGE UP (ref 39–50)
HDLC SERPL-MCNC: 72 MG/DL — SIGNIFICANT CHANGE UP (ref 40–125)
HGB BLD-MCNC: 15.1 G/DL — SIGNIFICANT CHANGE UP (ref 13–17)
LIPID PNL WITH DIRECT LDL SERPL: 114 MG/DL — SIGNIFICANT CHANGE UP
LYMPHOCYTES # BLD AUTO: 1.8 K/UL — SIGNIFICANT CHANGE UP (ref 1–3.3)
LYMPHOCYTES # BLD AUTO: 22.9 % — SIGNIFICANT CHANGE UP (ref 13–44)
MCHC RBC-ENTMCNC: 30.9 PG — SIGNIFICANT CHANGE UP (ref 27–34)
MCHC RBC-ENTMCNC: 34.7 GM/DL — SIGNIFICANT CHANGE UP (ref 32–36)
MCV RBC AUTO: 89.2 FL — SIGNIFICANT CHANGE UP (ref 80–100)
MONOCYTES # BLD AUTO: 0.6 K/UL — SIGNIFICANT CHANGE UP (ref 0–0.9)
MONOCYTES NFR BLD AUTO: 7.7 % — SIGNIFICANT CHANGE UP (ref 2–14)
NEUTROPHILS # BLD AUTO: 4.8 K/UL — SIGNIFICANT CHANGE UP (ref 1.8–7.4)
NEUTROPHILS NFR BLD AUTO: 60.4 % — SIGNIFICANT CHANGE UP (ref 43–77)
PLATELET # BLD AUTO: 240 K/UL — SIGNIFICANT CHANGE UP (ref 150–400)
POTASSIUM SERPL-MCNC: 3.6 MMOL/L — SIGNIFICANT CHANGE UP (ref 3.5–5.3)
POTASSIUM SERPL-SCNC: 3.6 MMOL/L — SIGNIFICANT CHANGE UP (ref 3.5–5.3)
RBC # BLD: 4.9 M/UL — SIGNIFICANT CHANGE UP (ref 4.2–5.8)
RBC # FLD: 11.3 % — SIGNIFICANT CHANGE UP (ref 10.3–14.5)
SODIUM SERPL-SCNC: 140 MMOL/L — SIGNIFICANT CHANGE UP (ref 135–145)
TOTAL CHOLESTEROL/HDL RATIO MEASUREMENT: 2.8 RATIO — LOW (ref 3.4–9.6)
TRIGL SERPL-MCNC: 78 MG/DL — SIGNIFICANT CHANGE UP (ref 10–149)
TROPONIN I SERPL-MCNC: <0.015 NG/ML — SIGNIFICANT CHANGE UP (ref 0.01–0.04)
TROPONIN I SERPL-MCNC: <0.015 NG/ML — SIGNIFICANT CHANGE UP (ref 0.01–0.04)
WBC # BLD: 7.9 K/UL — SIGNIFICANT CHANGE UP (ref 3.8–10.5)
WBC # FLD AUTO: 7.9 K/UL — SIGNIFICANT CHANGE UP (ref 3.8–10.5)

## 2017-09-25 PROCEDURE — 93306 TTE W/DOPPLER COMPLETE: CPT | Mod: 26

## 2017-09-25 PROCEDURE — 93010 ELECTROCARDIOGRAM REPORT: CPT

## 2017-09-25 PROCEDURE — 99233 SBSQ HOSP IP/OBS HIGH 50: CPT

## 2017-09-25 RX ORDER — ATENOLOL 25 MG/1
1 TABLET ORAL
Qty: 0 | Refills: 0 | COMMUNITY
Start: 2017-09-25

## 2017-09-25 RX ORDER — PANTOPRAZOLE SODIUM 20 MG/1
1 TABLET, DELAYED RELEASE ORAL
Qty: 30 | Refills: 0 | OUTPATIENT
Start: 2017-09-25 | End: 2017-10-25

## 2017-09-25 RX ADMIN — ESCITALOPRAM OXALATE 10 MILLIGRAM(S): 10 TABLET, FILM COATED ORAL at 15:05

## 2017-09-25 RX ADMIN — PANTOPRAZOLE SODIUM 40 MILLIGRAM(S): 20 TABLET, DELAYED RELEASE ORAL at 06:14

## 2017-09-25 RX ADMIN — INFLUENZA VIRUS VACCINE 0.5 MILLILITER(S): 15; 15; 15; 15 SUSPENSION INTRAMUSCULAR at 15:06

## 2017-09-25 RX ADMIN — HEPARIN SODIUM 5000 UNIT(S): 5000 INJECTION INTRAVENOUS; SUBCUTANEOUS at 06:14

## 2017-09-25 RX ADMIN — AMLODIPINE BESYLATE 5 MILLIGRAM(S): 2.5 TABLET ORAL at 06:14

## 2017-09-25 RX ADMIN — HEPARIN SODIUM 5000 UNIT(S): 5000 INJECTION INTRAVENOUS; SUBCUTANEOUS at 15:07

## 2017-09-25 RX ADMIN — ATENOLOL 25 MILLIGRAM(S): 25 TABLET ORAL at 06:14

## 2017-09-25 RX ADMIN — Medication 1 TABLET(S): at 15:08

## 2017-09-25 NOTE — DISCHARGE NOTE ADULT - PLAN OF CARE
resolution Take pantoprazole 40 mg daily  continue the same medication.  follow up with your primary care doctor.

## 2017-09-25 NOTE — PROGRESS NOTE ADULT - PROBLEM SELECTOR PLAN 2
currently his EKG shows normal variant , improved changes , probably the changes that were seen due to underlying hypertensive heart disease.  will obtain echocardiogram to rule out pericardial effusion

## 2017-09-25 NOTE — DISCHARGE NOTE ADULT - CARE PLAN
Principal Discharge DX:	Chest pain  Goal:	resolution  Instructions for follow-up, activity and diet:	Take pantoprazole 40 mg daily  continue the same medication.  follow up with your primary care doctor.

## 2017-09-25 NOTE — DISCHARGE NOTE ADULT - MEDICATION SUMMARY - MEDICATIONS TO TAKE
I will START or STAY ON the medications listed below when I get home from the hospital:    Lexapro 10 mg oral tablet  -- 1 tab(s) by mouth once a day  -- Indication: For Anxiety/stress    atenolol 25 mg oral tablet  -- 1 tab(s) by mouth once a day  -- Indication: For blood pressure    amLODIPine 5 mg oral tablet  -- 1 tab(s) by mouth once a day  -- Indication: For blood pressure    pantoprazole 40 mg oral delayed release tablet  -- 1 tab(s) by mouth once a day (before a meal)  -- Indication: For GERD/gastritis

## 2017-09-25 NOTE — PROGRESS NOTE ADULT - PROBLEM SELECTOR PLAN 1
? atypical chest pain , non specific EKG changes , normal coronaries?  GERD , resolved ? atypical chest pain , non specific EKG changes , normal coronaries?  GERD , resolved, continue Protonix

## 2017-09-25 NOTE — DISCHARGE NOTE ADULT - PATIENT PORTAL LINK FT
“You can access the [facility] Patient Portal, offered by [facility], by registering with the following website: [email or web address]”

## 2017-09-25 NOTE — PROGRESS NOTE ADULT - SUBJECTIVE AND OBJECTIVE BOX
PCP: Abdi Ambrose	    REASON FOR CONSULT: Chest pain, Abnormal ECG    CHIEF COMPLAINT: Chest pain    HPI: 40 y/o male with PMHx of HTN, HLD, gastritis, and chronic left sided chest pain since his gastritis was diagnosed, presents now to ED c/o chest pain, center of chest, tightness and burning, since 2 PM. 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 Had episodes last night as well but tonight's were more forceful. Also had associated Nausea. Pt took one dose  mg. Did also had one episode of vomiting at home. ECG in ER initially NSR, LVH with strain. Repeat done after with recurrence of chest pain suggested some ST elevation in V leads with reciprocal ST depressions inferiorly.      9/25/17  Patient had cardiac cath ,showing normal coronaries ,  patient says his chest pain resolved , blood pressure is improved. Patient had hx of mild hyperlipidemia     PAST MEDICAL & SURGICAL HISTORY:  Gastritis without bleeding, unspecified chronicity, unspecified gastritis type  Hyperlipidemia, unspecified hyperlipidemia type  HTN (hypertension)  Status post shoulder surgery: left    SOCIAL HISTORY: Former Smoker/Social ETOH/ No Ilicit Drug use.    FAMILY HISTORY:  No pertinent cardiac family history in first degree relatives    Allergies: No Known Allergies    MEDICATIONS  (STANDING):  sodium chloride 0.9% Bolus 1000 milliLiter(s) IV Bolus once  sodium chloride 0.9%. 1000 milliLiter(s) (80 mL/Hr) IV Continuous <Continuous>  multivitamin 1 Tablet(s) Oral daily  heparin  Injectable 5000 Unit(s) SubCutaneous every 8 hours  escitalopram 10 milliGRAM(s) Oral daily  amLODIPine   Tablet 5 milliGRAM(s) Oral daily  ATENolol  Tablet 25 milliGRAM(s) Oral daily  pantoprazole    Tablet 40 milliGRAM(s) Oral before breakfast  influenza   Vaccine 0.5 milliLiter(s) IntraMuscular once    MEDICATIONS  (PRN):  ondansetron Injectable 4 milliGRAM(s) IV Push every 6 hours PRN Nausea  LORazepam     Tablet 0.5 milliGRAM(s) Oral two times a day PRN Anxiety      REVIEW OF SYSTEMS: 13 systems were reviewed and all negative except for comments above.      Vital Signs Last 24 Hrs  T(C): 36.7 (25 Sep 2017 06:50), Max: 37.4 (24 Sep 2017 17:53)  T(F): 98 (25 Sep 2017 06:50), Max: 99.4 (24 Sep 2017 17:53)  HR: 70 (24 Sep 2017 21:00) (69 - 127)  BP: 122/76 (24 Sep 2017 21:00) (122/76 - 186/111)  BP(mean): 87 (24 Sep 2017 21:00) (87 - 105)  RR: 20 (24 Sep 2017 21:00) (15 - 28)  SpO2: 98% (24 Sep 2017 21:00) (97% - 99%)    I&O's Summary    24 Sep 2017 07:01  -  25 Sep 2017 07:00  --------------------------------------------------------  IN: 2000 mL / OUT: 1100 mL / NET: 900 mL        PHYSICAL EXAM:  Constitutional: NAD, awake and alert, well-developed  HEENT: PERRLA, EOMI,  No oral cyanosis. Oropharynx Clean and Dry.  Neck:  supple,  No JVD, No Thyroid enlargement. No Carotid Bruits bilaterally.  Respiratory: Breath sounds are clear bilaterally, No wheezing, rales or rhonchi  Cardiovascular: NL S1 and S2, RRR, +s4, No murmurs.  Gastrointestinal: Bowel Sounds present, soft, NT, ND, No HSM.   Extremities: No peripheral edema. No clubbing or cyanosis. Barbeau Test performed in [facility] and Negative.  Vascular: 2+ peripheral pulses in LE   Neurological: A/O x 3, no focal motor or sensory deficits  Musculoskeletal: no calf tenderness or joint swelling.  Skin: No rashes.    EKG: #1 NSR, LVH with repolarization abnormality.  #2 NSR, LVH, Repol., 1-2 mm ST elevation v1-V2, Peaked t waves v3, v4. ST depressions upsloping, III, AVF.                            15.1   7.9   )-----------( 240      ( 25 Sep 2017 05:26 )             43.7     09-25    140  |  106  |  11  ----------------------------<  95  3.6   |  30  |  0.91    Ca    8.6      25 Sep 2017 05:26    TPro  7.5  /  Alb  4.0  /  TBili  0.4  /  DBili  x   /  AST  28  /  ALT  31  /  AlkPhos  99  09-24    CARDIAC MARKERS ( 25 Sep 2017 05:26 )  <0.015 ng/mL / x     / 263 U/L / x     / x      CARDIAC MARKERS ( 24 Sep 2017 23:25 )  <0.015 ng/mL / x     / 297 U/L / x     / x      CARDIAC MARKERS ( 24 Sep 2017 17:51 )  <0.015 ng/mL / x     / 347 U/L / x     / x          LIVER FUNCTIONS - ( 24 Sep 2017 17:51 )  Alb: 4.0 g/dL / Pro: 7.5 gm/dL / ALK PHOS: 99 U/L / ALT: 31 U/L / AST: 28 U/L / GGT: x           PT/INR - ( 24 Sep 2017 17:51 )   PT: 11.8 sec;   INR: 1.09 ratio         PTT - ( 24 Sep 2017 17:51 )  PTT:35.6 sec      EKG 9/25/17 Sinus bradycardia LVH  J point St elevation  inferior lateral leads , normal variant early repolarization

## 2017-09-25 NOTE — DISCHARGE NOTE ADULT - CARE PROVIDER_API CALL
Sandra Calvo), Family Medicine  [address] [place] [address]  Phone: [phone]  Fax: [phone]

## 2017-09-25 NOTE — DISCHARGE NOTE ADULT - MEDICATION SUMMARY - MEDICATIONS TO STOP TAKING
I will STOP taking the medications listed below when I get home from the hospital:    raNITIdine 150 mg oral tablet  -- 1 tab(s) by mouth 2 times a day    Ativan 0.5 mg oral tablet  -- 1 tab(s) by mouth 2 times a day MDD:2  -- Caution federal law prohibits the transfer of this drug to any person other  than the person for whom it was prescribed.  Do not take this drug if you are pregnant.  May cause drowsiness.  Alcohol may intensify this effect.  Use care when operating dangerous machinery.

## 2017-09-25 NOTE — DISCHARGE NOTE ADULT - HOSPITAL COURSE
CC: chest pain	  Subjective: No chest pain, sob, no palpitations.   ROS: Negative except as above.    Vital Signs Last 24 Hrs  T(C): 36.4 (25 Sep 2017 07:40), Max: 37.4 (24 Sep 2017 17:53)  T(F): 97.5 (25 Sep 2017 07:40), Max: 99.4 (24 Sep 2017 17:53)  HR: 60 (25 Sep 2017 09:00) (60 - 127)  BP: 131/93 (25 Sep 2017 09:00) (122/76 - 186/111)  BP(mean): 103 (25 Sep 2017 09:00) (87 - 105)  RR: 14 (25 Sep 2017 09:00) (14 - 28)  SpO2: 98% (24 Sep 2017 21:00) (97% - 99%)    PHYSICAL EXAM:  Constitutional: NAD, well-groomed, well-developed  HEENT: PERRLA, EOMI, Normal Hearing, MMM  Neck: No LAD, No JVD  Back: Normal spine flexure, No CVA tenderness  Respiratory: CTABL  Cardiovascular: S1 and S2, RRR, no M/G/R  Gastrointestinal: BS+, soft, NT/ND  Extremities: No peripheral edema  Vascular: 2+ peripheral pulses  Neurological: A/O x 3, no focal deficits  Psychiatric: Normal mood, normal affect  Musculoskeletal: 5/5 strength b/l upper and lower extremities  Skin: No rashes               15.1   7.9   )-----------( 240      ( 25 Sep 2017 05:26 )             43.7     140  |  106  |  11  ----------------------------<  95  3.6   |  30  |  0.91    Ca    8.6      25 Sep 2017 05:26    TPro  7.5  /  Alb  4.0  /  TBili  0.4  /  DBili  x   /  AST  28  /  ALT  31  /  AlkPhos  99  09-24    CARDIAC MARKERS ( 25 Sep 2017 05:26 )  <0.015 ng/mL / x     / 263 U/L / x     / x      CARDIAC MARKERS ( 24 Sep 2017 23:25 )  <0.015 ng/mL / x     / 297 U/L / x     / x      CARDIAC MARKERS ( 24 Sep 2017 17:51 )  <0.015 ng/mL / x     / 347 U/L / x     / x        LIVER FUNCTIONS - ( 24 Sep 2017 17:51 )  Alb: 4.0 g/dL / Pro: 7.5 gm/dL / ALK PHOS: 99 U/L / ALT: 31 U/L / AST: 28 U/L / GGT: x           PT/INR - ( 24 Sep 2017 17:51 )   PT: 11.8 sec;   INR: 1.09 ratio    PTT - ( 24 Sep 2017 17:51 )  PTT:35.6 sec    Assessment and plan     # Chest pain  ACS ruled out. Cath negative  No pericardial effusion on echo  Possibly related to GERD (has associated symptoms of nausea/vomiting a/w food.   Trial PPI  F/u pmd    # Anxiety/Stress  - c/w Lexapro    # Htn  - c/w home meds    Time spent on discharge 40 minutes

## 2017-09-28 DIAGNOSIS — F41.9 ANXIETY DISORDER, UNSPECIFIED: ICD-10-CM

## 2017-09-28 DIAGNOSIS — K21.9 GASTRO-ESOPHAGEAL REFLUX DISEASE WITHOUT ESOPHAGITIS: ICD-10-CM

## 2017-09-28 DIAGNOSIS — E78.5 HYPERLIPIDEMIA, UNSPECIFIED: ICD-10-CM

## 2017-09-28 DIAGNOSIS — Z87.891 PERSONAL HISTORY OF NICOTINE DEPENDENCE: ICD-10-CM

## 2017-09-28 DIAGNOSIS — I10 ESSENTIAL (PRIMARY) HYPERTENSION: ICD-10-CM

## 2017-10-16 ENCOUNTER — EMERGENCY (EMERGENCY)
Facility: HOSPITAL | Age: 42
LOS: 0 days | Discharge: HOME | End: 2017-10-16
Attending: EMERGENCY MEDICINE | Admitting: EMERGENCY MEDICINE
Payer: MEDICAID

## 2017-10-16 VITALS
SYSTOLIC BLOOD PRESSURE: 157 MMHG | DIASTOLIC BLOOD PRESSURE: 108 MMHG | OXYGEN SATURATION: 99 % | HEART RATE: 75 BPM | RESPIRATION RATE: 18 BRPM

## 2017-10-16 VITALS
HEIGHT: 68 IN | WEIGHT: 169.98 LBS | TEMPERATURE: 98 F | RESPIRATION RATE: 16 BRPM | OXYGEN SATURATION: 100 % | DIASTOLIC BLOOD PRESSURE: 113 MMHG | SYSTOLIC BLOOD PRESSURE: 164 MMHG | HEART RATE: 93 BPM

## 2017-10-16 DIAGNOSIS — Z98.890 OTHER SPECIFIED POSTPROCEDURAL STATES: Chronic | ICD-10-CM

## 2017-10-16 PROCEDURE — 73030 X-RAY EXAM OF SHOULDER: CPT | Mod: 26,77,LT

## 2017-10-16 PROCEDURE — 99284 EMERGENCY DEPT VISIT MOD MDM: CPT

## 2017-10-16 PROCEDURE — 73030 X-RAY EXAM OF SHOULDER: CPT | Mod: 26,LT

## 2017-10-16 PROCEDURE — 23650 CLTX SHO DSLC W/MNPJ WO ANES: CPT

## 2017-10-16 NOTE — ED STATDOCS - ATTENDING CONTRIBUTION TO CARE
I, Varinder Macias, performed the initial face to face bedside interview with this patient regarding history of present illness, review of symptoms and relevant past medical, social and family history.  I completed an independent physical examination.  I was the initial provider who evaluated this patient. I have signed out the follow up of any pending tests (i.e. labs, radiological studies) to the ACP.  I have communicated the patient’s plan of care and disposition with the ACP.  The history, relevant review of systems, past medical and surgical history, medical decision making, and physical examination was documented by the scribe in my presence and I attest to the accuracy of the documentation.

## 2017-10-16 NOTE — ED ADULT TRIAGE NOTE - CHIEF COMPLAINT QUOTE
was pulling plastic off window and dislocated left shoulder, no fall or trauma.  Third time shoulder has dislocated

## 2017-10-16 NOTE — ED STATDOCS - PROGRESS NOTE DETAILS
signed Gabriella Cordero PA-C Pt seen initially in intake by Dr Macias.   Pt declines  services. pt presents with left shoulder dislocation PTA, non traumatic mechanism. Pt has had previous shoulder dislocations and resultant sx. Unsure who ortho is. Gross motor/sensation intact distal to left shoulder, no axillary nerve deficit prior to reduction, radial pulse 2+. Shoulder reduced successfully after xray and intraarticular injection lido 1% no epi. Hima's techniques used, immobilizer applied after reduction, satisfactory post-reduction xrays, motor/sensation intact 2+ radial pulse. NSAIDs, outpt f/u hand. Pt feeling well, agrees with DC and plan of care. BP elevated in ED, pt asymptomatic, forgot to take BP meds today, has them outside in his truck, will take when he leaves. recommend f/u PMD repeat BP check. Pt feeling well, agrees with DC and plan of care.

## 2017-10-16 NOTE — ED STATDOCS - OBJECTIVE STATEMENT
41M pmhx HTN presents to ED c/o shoulder pain. States was pulling plastic off window and dislocated left shoulder, no fall or trauma.  Third time shoulder has dislocated. Pt has no other complaints and denies SOB, CP, fever/chills, n/v/d and HA. PMD [place] in [place].

## 2017-10-18 DIAGNOSIS — M24.412 RECURRENT DISLOCATION, LEFT SHOULDER: ICD-10-CM

## 2017-10-19 ENCOUNTER — EMERGENCY (EMERGENCY)
Facility: HOSPITAL | Age: 42
LOS: 0 days | Discharge: HOME | End: 2017-10-19
Attending: EMERGENCY MEDICINE | Admitting: EMERGENCY MEDICINE
Payer: MEDICAID

## 2017-10-19 VITALS
RESPIRATION RATE: 18 BRPM | DIASTOLIC BLOOD PRESSURE: 80 MMHG | TEMPERATURE: 97 F | WEIGHT: 147.05 LBS | OXYGEN SATURATION: 100 % | HEART RATE: 80 BPM | SYSTOLIC BLOOD PRESSURE: 119 MMHG | HEIGHT: 66 IN

## 2017-10-19 VITALS — SYSTOLIC BLOOD PRESSURE: 112 MMHG | OXYGEN SATURATION: 100 % | HEART RATE: 75 BPM | DIASTOLIC BLOOD PRESSURE: 78 MMHG

## 2017-10-19 DIAGNOSIS — Z98.890 OTHER SPECIFIED POSTPROCEDURAL STATES: Chronic | ICD-10-CM

## 2017-10-19 PROCEDURE — 73030 X-RAY EXAM OF SHOULDER: CPT | Mod: 26,76,LT

## 2017-10-19 PROCEDURE — 99283 EMERGENCY DEPT VISIT LOW MDM: CPT | Mod: 25

## 2017-10-19 RX ORDER — HYDROMORPHONE HYDROCHLORIDE 2 MG/ML
1 INJECTION INTRAMUSCULAR; INTRAVENOUS; SUBCUTANEOUS ONCE
Qty: 0 | Refills: 0 | Status: DISCONTINUED | OUTPATIENT
Start: 2017-10-19 | End: 2017-10-19

## 2017-10-19 RX ADMIN — HYDROMORPHONE HYDROCHLORIDE 1 MILLIGRAM(S): 2 INJECTION INTRAMUSCULAR; INTRAVENOUS; SUBCUTANEOUS at 03:07

## 2017-10-19 RX ADMIN — HYDROMORPHONE HYDROCHLORIDE 1 MILLIGRAM(S): 2 INJECTION INTRAMUSCULAR; INTRAVENOUS; SUBCUTANEOUS at 02:37

## 2017-10-19 NOTE — CONSULT NOTE ADULT - SUBJECTIVE AND OBJECTIVE BOX
41y Male right hand dominant presents c/o L shoulder pain, felt it dislocate when he was sleeping with shoulder immobilizer on. Patient was here 2 days ago for dislocation. Denies Headstrike/LOC. Denies numbness, tingling paresthesias in affected extremity. Able to ambulate after fall. No other orthopedic injuries at this time. Has dislocated 5x in past few years.    PAST MEDICAL & SURGICAL HISTORY:  Gastritis without bleeding, unspecified chronicity, unspecified gastritis type  Hyperlipidemia, unspecified hyperlipidemia type  HTN (hypertension)  Status post shoulder surgery: left    MEDICATIONS  (STANDING):  Allergies  No Known Allergies      Imaging: XR demonstates L shoulder GHJ dislocation    Vital Signs Last 24 Hrs  T(C): 36.2 (10-19-17 @ 01:23), Max: 36.2 (10-19-17 @ 01:23)  T(F): 97.1 (10-19-17 @ 01:23), Max: 97.1 (10-19-17 @ 01:23)  HR: 80 (10-19-17 @ 01:23) (80 - 80)  BP: 119/80 (10-19-17 @ 01:23) (119/80 - 119/80)  BP(mean): --  RR: 18 (10-19-17 @ 01:23) (18 - 18)  SpO2: 100% (10-19-17 @ 01:23) (100% - 100%)  Gen: NAD  LUE: Skin intact, +anterior shoulder fullness, +sulcus sign, unable to range shoulder 2/2 pain, +ain/pin/m/r/u function, SILT C5-T1, radial pulse intact, compartments soft, brisk cap refill     Procedure: 20cc 1% lidocaine injected under sterile procedure into R/L shoulder joint. Closed reduction performed. Post procedure imaging demonstrates located shoulder joint. Post procedure exam demonstrated NV intact.    A/P: 41y Male w L shoulder dislocation sp closed reduction  Patient lives in [place] and doesn't have a car, is requesting an orthopedist that he can walk to in town  post reduction axillary view demonstrate adequate reduction  Pain control  NWB L UE in sling, keep c/d/i  Ice  Active movement of fingers/wrist/elbow encouraged  Possible need for surgical intervention in future discussed with patient  Follow up within 1 week, call office for appointment  Ortho stable

## 2017-10-19 NOTE — ED PROVIDER NOTE - MEDICAL DECISION MAKING DETAILS
Pt with left shoulder dislocation.  Plan xray, pain control, Ortho to reduce. Pt with left shoulder dislocation, hx of multiple.  Plan xray, pain control, Ortho to reduce.

## 2017-10-19 NOTE — ED PROVIDER NOTE - PROGRESS NOTE DETAILS
ortho called and will be down for shoulder reduction complete by ortho, pt had sling applied by ortho. tbd, he knows he has to follow up with orthopedist

## 2017-10-19 NOTE — ED ADULT NURSE NOTE - OBJECTIVE STATEMENT
pt arrives to ED complaining of left shoulder pain. pt was sleeping and feels like his shoulder dislocated. pt was recently here two days ago for left shoulder dislocation and was sent home with a sling. pt alert and oriented x 4. vss.

## 2017-10-19 NOTE — ED PROVIDER NOTE - OBJECTIVE STATEMENT
42 yo male pw left shoulder dislocation. Pt states he was sleeping rolled over and felt shoulder pain, Pt has same shoulder dislocate 2 days ago. this is the 5 th shoulder dislocation for this man. 40 yo male pw left shoulder dislocation. Pt states he was sleeping rolled over and felt shoulder pain, Pt has same shoulder dislocated 2 days ago. this is the 5 th shoulder dislocation for this man.

## 2017-10-20 DIAGNOSIS — M24.412 RECURRENT DISLOCATION, LEFT SHOULDER: ICD-10-CM

## 2018-02-09 ENCOUNTER — EMERGENCY (EMERGENCY)
Facility: HOSPITAL | Age: 43
LOS: 0 days | Discharge: HOME | End: 2018-02-09
Attending: EMERGENCY MEDICINE | Admitting: EMERGENCY MEDICINE
Payer: MEDICAID

## 2018-02-09 VITALS
DIASTOLIC BLOOD PRESSURE: 89 MMHG | SYSTOLIC BLOOD PRESSURE: 129 MMHG | RESPIRATION RATE: 20 BRPM | HEART RATE: 68 BPM | TEMPERATURE: 98 F | OXYGEN SATURATION: 100 %

## 2018-02-09 VITALS — HEIGHT: 66 IN | WEIGHT: 147.05 LBS

## 2018-02-09 DIAGNOSIS — I10 ESSENTIAL (PRIMARY) HYPERTENSION: ICD-10-CM

## 2018-02-09 DIAGNOSIS — R00.2 PALPITATIONS: ICD-10-CM

## 2018-02-09 DIAGNOSIS — Z98.890 OTHER SPECIFIED POSTPROCEDURAL STATES: Chronic | ICD-10-CM

## 2018-02-09 DIAGNOSIS — R07.9 CHEST PAIN, UNSPECIFIED: ICD-10-CM

## 2018-02-09 LAB
ALBUMIN SERPL ELPH-MCNC: 4 G/DL — SIGNIFICANT CHANGE UP (ref 3.3–5)
ALP SERPL-CCNC: 81 U/L — SIGNIFICANT CHANGE UP (ref 40–120)
ALT FLD-CCNC: 45 U/L — SIGNIFICANT CHANGE UP (ref 12–78)
ANION GAP SERPL CALC-SCNC: 8 MMOL/L — SIGNIFICANT CHANGE UP (ref 5–17)
AST SERPL-CCNC: 32 U/L — SIGNIFICANT CHANGE UP (ref 15–37)
BASOPHILS # BLD AUTO: 0.1 K/UL — SIGNIFICANT CHANGE UP (ref 0–0.2)
BASOPHILS NFR BLD AUTO: 1.4 % — SIGNIFICANT CHANGE UP (ref 0–2)
BILIRUB SERPL-MCNC: 0.6 MG/DL — SIGNIFICANT CHANGE UP (ref 0.2–1.2)
BUN SERPL-MCNC: 15 MG/DL — SIGNIFICANT CHANGE UP (ref 7–23)
CALCIUM SERPL-MCNC: 8.5 MG/DL — SIGNIFICANT CHANGE UP (ref 8.5–10.1)
CHLORIDE SERPL-SCNC: 104 MMOL/L — SIGNIFICANT CHANGE UP (ref 96–108)
CK SERPL-CCNC: 277 U/L — SIGNIFICANT CHANGE UP (ref 26–308)
CO2 SERPL-SCNC: 28 MMOL/L — SIGNIFICANT CHANGE UP (ref 22–31)
CREAT SERPL-MCNC: 1 MG/DL — SIGNIFICANT CHANGE UP (ref 0.5–1.3)
EOSINOPHIL # BLD AUTO: 0.9 K/UL — HIGH (ref 0–0.5)
EOSINOPHIL NFR BLD AUTO: 12.9 % — HIGH (ref 0–6)
GLUCOSE SERPL-MCNC: 134 MG/DL — HIGH (ref 70–99)
HCT VFR BLD CALC: 49.6 % — SIGNIFICANT CHANGE UP (ref 39–50)
HGB BLD-MCNC: 17.2 G/DL — HIGH (ref 13–17)
LYMPHOCYTES # BLD AUTO: 3.1 K/UL — SIGNIFICANT CHANGE UP (ref 1–3.3)
LYMPHOCYTES # BLD AUTO: 44.1 % — HIGH (ref 13–44)
MCHC RBC-ENTMCNC: 30.3 PG — SIGNIFICANT CHANGE UP (ref 27–34)
MCHC RBC-ENTMCNC: 34.6 GM/DL — SIGNIFICANT CHANGE UP (ref 32–36)
MCV RBC AUTO: 87.6 FL — SIGNIFICANT CHANGE UP (ref 80–100)
MONOCYTES # BLD AUTO: 0.5 K/UL — SIGNIFICANT CHANGE UP (ref 0–0.9)
MONOCYTES NFR BLD AUTO: 7.2 % — SIGNIFICANT CHANGE UP (ref 2–14)
NEUTROPHILS # BLD AUTO: 2.4 K/UL — SIGNIFICANT CHANGE UP (ref 1.8–7.4)
NEUTROPHILS NFR BLD AUTO: 34.3 % — LOW (ref 43–77)
PLATELET # BLD AUTO: 221 K/UL — SIGNIFICANT CHANGE UP (ref 150–400)
POTASSIUM SERPL-MCNC: 3.4 MMOL/L — LOW (ref 3.5–5.3)
POTASSIUM SERPL-SCNC: 3.4 MMOL/L — LOW (ref 3.5–5.3)
PROT SERPL-MCNC: 7.1 GM/DL — SIGNIFICANT CHANGE UP (ref 6–8.3)
RBC # BLD: 5.66 M/UL — SIGNIFICANT CHANGE UP (ref 4.2–5.8)
RBC # FLD: 11.6 % — SIGNIFICANT CHANGE UP (ref 10.3–14.5)
SODIUM SERPL-SCNC: 140 MMOL/L — SIGNIFICANT CHANGE UP (ref 135–145)
TROPONIN I SERPL-MCNC: <0.015 NG/ML — SIGNIFICANT CHANGE UP (ref 0.01–0.04)
WBC # BLD: 7 K/UL — SIGNIFICANT CHANGE UP (ref 3.8–10.5)
WBC # FLD AUTO: 7 K/UL — SIGNIFICANT CHANGE UP (ref 3.8–10.5)

## 2018-02-09 PROCEDURE — 71045 X-RAY EXAM CHEST 1 VIEW: CPT | Mod: 26

## 2018-02-09 PROCEDURE — 93010 ELECTROCARDIOGRAM REPORT: CPT

## 2018-02-09 PROCEDURE — 99285 EMERGENCY DEPT VISIT HI MDM: CPT

## 2018-02-09 RX ORDER — ONDANSETRON 8 MG/1
4 TABLET, FILM COATED ORAL ONCE
Qty: 0 | Refills: 0 | Status: COMPLETED | OUTPATIENT
Start: 2018-02-09 | End: 2018-02-09

## 2018-02-09 RX ORDER — ATENOLOL 25 MG/1
25 TABLET ORAL ONCE
Qty: 0 | Refills: 0 | Status: COMPLETED | OUTPATIENT
Start: 2018-02-09 | End: 2018-02-09

## 2018-02-09 RX ORDER — AMLODIPINE BESYLATE 2.5 MG/1
10 TABLET ORAL ONCE
Qty: 0 | Refills: 0 | Status: COMPLETED | OUTPATIENT
Start: 2018-02-09 | End: 2018-02-09

## 2018-02-09 RX ORDER — ATENOLOL 25 MG/1
1 TABLET ORAL
Qty: 14 | Refills: 0 | OUTPATIENT
Start: 2018-02-09 | End: 2018-02-22

## 2018-02-09 RX ORDER — SODIUM CHLORIDE 9 MG/ML
3 INJECTION INTRAMUSCULAR; INTRAVENOUS; SUBCUTANEOUS ONCE
Qty: 0 | Refills: 0 | Status: COMPLETED | OUTPATIENT
Start: 2018-02-09 | End: 2018-02-09

## 2018-02-09 RX ORDER — AMLODIPINE BESYLATE 2.5 MG/1
1 TABLET ORAL
Qty: 14 | Refills: 0 | OUTPATIENT
Start: 2018-02-09 | End: 2018-02-22

## 2018-02-09 RX ORDER — SODIUM CHLORIDE 9 MG/ML
500 INJECTION INTRAMUSCULAR; INTRAVENOUS; SUBCUTANEOUS ONCE
Qty: 0 | Refills: 0 | Status: COMPLETED | OUTPATIENT
Start: 2018-02-09 | End: 2018-02-09

## 2018-02-09 RX ORDER — ASPIRIN/CALCIUM CARB/MAGNESIUM 324 MG
324 TABLET ORAL ONCE
Qty: 0 | Refills: 0 | Status: COMPLETED | OUTPATIENT
Start: 2018-02-09 | End: 2018-02-09

## 2018-02-09 RX ORDER — POTASSIUM CHLORIDE 20 MEQ
40 PACKET (EA) ORAL ONCE
Qty: 0 | Refills: 0 | Status: DISCONTINUED | OUTPATIENT
Start: 2018-02-09 | End: 2018-02-09

## 2018-02-09 RX ORDER — PANTOPRAZOLE SODIUM 20 MG/1
40 TABLET, DELAYED RELEASE ORAL ONCE
Qty: 0 | Refills: 0 | Status: COMPLETED | OUTPATIENT
Start: 2018-02-09 | End: 2018-02-09

## 2018-02-09 RX ADMIN — PANTOPRAZOLE SODIUM 40 MILLIGRAM(S): 20 TABLET, DELAYED RELEASE ORAL at 06:48

## 2018-02-09 RX ADMIN — Medication 324 MILLIGRAM(S): at 07:06

## 2018-02-09 RX ADMIN — SODIUM CHLORIDE 500 MILLILITER(S): 9 INJECTION INTRAMUSCULAR; INTRAVENOUS; SUBCUTANEOUS at 06:48

## 2018-02-09 RX ADMIN — AMLODIPINE BESYLATE 10 MILLIGRAM(S): 2.5 TABLET ORAL at 07:06

## 2018-02-09 RX ADMIN — SODIUM CHLORIDE 3 MILLILITER(S): 9 INJECTION INTRAMUSCULAR; INTRAVENOUS; SUBCUTANEOUS at 06:48

## 2018-02-09 RX ADMIN — ONDANSETRON 4 MILLIGRAM(S): 8 TABLET, FILM COATED ORAL at 06:48

## 2018-02-09 RX ADMIN — ATENOLOL 25 MILLIGRAM(S): 25 TABLET ORAL at 07:06

## 2018-02-09 NOTE — ED ADULT TRIAGE NOTE - CHIEF COMPLAINT QUOTE
woke up with chest tightness, burning sensation and vomited x1. Pt checked his BP and noted 200/120. Hx of HTN. Denies headache, sob.

## 2018-02-09 NOTE — ED PROVIDER NOTE - MEDICAL DECISION MAKING DETAILS
41 yo HM, h/o HTN & HLD, p/w palps. & some intermittent cp.  P/E unremarkable.  Plan: EKG, monitor, labs (incl. serial Delgado), CXR, BP meds.  Observe, reassess.

## 2018-02-09 NOTE — ED PROVIDER NOTE - NEUROLOGICAL, MLM
Alert and oriented, no focal deficits, no motor or sensory deficits.  CNs grossly normal.  Normal speech.

## 2018-02-09 NOTE — ED PROVIDER NOTE - OBJECTIVE STATEMENT
Exam begun at 06:25.    43 yo HM, h/o HTN & HLD, ambulatory to ED c/o'ing palpitations & chest discomfort.   Sympts. onset ~ 5 AM, awakening him from sleep.  Pt feels heart racing & pounding.  Also + L anterior chest tightness, intermittent, non-pleuritic.  + Associated mild SOB, N.  No assoc. V, F/C, cough, diaphoresis, abd. pain, LOC.  BP taken at home 194/120, didn't take his daily BP meds (usually takes them ~ 8 AM).  Presently pt w/ + mild palps. & mild cp.  No recent extended travel hx past 3 mos. or so.  PMH:  HTN.  NKDA.  PCP: Abdi.

## 2018-02-09 NOTE — ED PROVIDER NOTE - MUSCULOSKELETAL, MLM
Spine appears normal, range of motion is not limited, no muscle or joint tenderness.  HERRERA x 4.  No focal swelling/ tenderness.

## 2018-02-09 NOTE — ED PROVIDER NOTE - CARE PLAN
Principal Discharge DX:	Palpitations  Secondary Diagnosis:	Chest pain with low risk of acute coronary syndrome  Secondary Diagnosis:	Essential hypertension

## 2018-02-09 NOTE — ED PROVIDER NOTE - PROGRESS NOTE DETAILS
Dr. Burns:  Reevaluated patient at bedside.  Patient feeling much improved.  Discussed the results of all diagnostic testing in ED and copies of all reports given.   An opportunity to ask questions was given.  Discussed the importance of prompt, close medical follow-up.  Patient will return with any changes, concerns or persistent / worsening symptoms.  Understanding of all instructions verbalized.

## 2018-02-09 NOTE — ED ADULT NURSE NOTE - OBJECTIVE STATEMENT
pt presents to ED c/o chest tightness, nausea, palpitations that woke him up and high blood pressure. pt denies sob, dizziness, abd pain, fevers/chills.

## 2018-02-11 ENCOUNTER — EMERGENCY (EMERGENCY)
Facility: HOSPITAL | Age: 43
LOS: 0 days | Discharge: HOME | End: 2018-02-12
Attending: EMERGENCY MEDICINE | Admitting: EMERGENCY MEDICINE
Payer: MEDICAID

## 2018-02-11 VITALS
TEMPERATURE: 98 F | HEART RATE: 110 BPM | HEIGHT: 66 IN | DIASTOLIC BLOOD PRESSURE: 105 MMHG | OXYGEN SATURATION: 100 % | RESPIRATION RATE: 18 BRPM | WEIGHT: 136.91 LBS | SYSTOLIC BLOOD PRESSURE: 166 MMHG

## 2018-02-11 DIAGNOSIS — Z98.890 OTHER SPECIFIED POSTPROCEDURAL STATES: Chronic | ICD-10-CM

## 2018-02-11 DIAGNOSIS — R00.2 PALPITATIONS: ICD-10-CM

## 2018-02-11 DIAGNOSIS — R42 DIZZINESS AND GIDDINESS: ICD-10-CM

## 2018-02-11 PROCEDURE — 93010 ELECTROCARDIOGRAM REPORT: CPT

## 2018-02-11 PROCEDURE — 99284 EMERGENCY DEPT VISIT MOD MDM: CPT | Mod: 25

## 2018-02-11 NOTE — ED ADULT TRIAGE NOTE - CHIEF COMPLAINT QUOTE
chest tightness started 1 week ago, was seen in ed friday and was given blood pressure medication. no c/o chest tightness and difficulty breathing with dizziness and nausea chest tightness started 1 week ago, was seen in ED friday and was given blood pressure medication. no c/o chest tightness and difficulty breathing with dizziness and nausea. took BP medications as scheduled today

## 2018-02-12 VITALS
RESPIRATION RATE: 18 BRPM | TEMPERATURE: 98 F | DIASTOLIC BLOOD PRESSURE: 82 MMHG | OXYGEN SATURATION: 97 % | HEART RATE: 65 BPM | SYSTOLIC BLOOD PRESSURE: 119 MMHG

## 2018-02-12 LAB
ALBUMIN SERPL ELPH-MCNC: 3.9 G/DL — SIGNIFICANT CHANGE UP (ref 3.3–5)
ALP SERPL-CCNC: 76 U/L — SIGNIFICANT CHANGE UP (ref 40–120)
ALT FLD-CCNC: 35 U/L — SIGNIFICANT CHANGE UP (ref 12–78)
ANION GAP SERPL CALC-SCNC: 8 MMOL/L — SIGNIFICANT CHANGE UP (ref 5–17)
APTT BLD: 33.7 SEC — SIGNIFICANT CHANGE UP (ref 27.5–37.4)
AST SERPL-CCNC: 27 U/L — SIGNIFICANT CHANGE UP (ref 15–37)
BASOPHILS # BLD AUTO: 0.1 K/UL — SIGNIFICANT CHANGE UP (ref 0–0.2)
BASOPHILS NFR BLD AUTO: 1.3 % — SIGNIFICANT CHANGE UP (ref 0–2)
BILIRUB SERPL-MCNC: 0.5 MG/DL — SIGNIFICANT CHANGE UP (ref 0.2–1.2)
BUN SERPL-MCNC: 13 MG/DL — SIGNIFICANT CHANGE UP (ref 7–23)
CALCIUM SERPL-MCNC: 8.5 MG/DL — SIGNIFICANT CHANGE UP (ref 8.5–10.1)
CHLORIDE SERPL-SCNC: 102 MMOL/L — SIGNIFICANT CHANGE UP (ref 96–108)
CK SERPL-CCNC: 164 U/L — SIGNIFICANT CHANGE UP (ref 26–308)
CO2 SERPL-SCNC: 26 MMOL/L — SIGNIFICANT CHANGE UP (ref 22–31)
CREAT SERPL-MCNC: 0.95 MG/DL — SIGNIFICANT CHANGE UP (ref 0.5–1.3)
EOSINOPHIL # BLD AUTO: 0.6 K/UL — HIGH (ref 0–0.5)
EOSINOPHIL NFR BLD AUTO: 9 % — HIGH (ref 0–6)
GLUCOSE SERPL-MCNC: 157 MG/DL — HIGH (ref 70–99)
HCT VFR BLD CALC: 49.1 % — SIGNIFICANT CHANGE UP (ref 39–50)
HGB BLD-MCNC: 16.7 G/DL — SIGNIFICANT CHANGE UP (ref 13–17)
INR BLD: 1.08 RATIO — SIGNIFICANT CHANGE UP (ref 0.88–1.16)
LYMPHOCYTES # BLD AUTO: 2.3 K/UL — SIGNIFICANT CHANGE UP (ref 1–3.3)
LYMPHOCYTES # BLD AUTO: 35.8 % — SIGNIFICANT CHANGE UP (ref 13–44)
MCHC RBC-ENTMCNC: 29.7 PG — SIGNIFICANT CHANGE UP (ref 27–34)
MCHC RBC-ENTMCNC: 34.1 GM/DL — SIGNIFICANT CHANGE UP (ref 32–36)
MCV RBC AUTO: 87 FL — SIGNIFICANT CHANGE UP (ref 80–100)
MONOCYTES # BLD AUTO: 0.5 K/UL — SIGNIFICANT CHANGE UP (ref 0–0.9)
MONOCYTES NFR BLD AUTO: 8.2 % — SIGNIFICANT CHANGE UP (ref 2–14)
NEUTROPHILS # BLD AUTO: 2.9 K/UL — SIGNIFICANT CHANGE UP (ref 1.8–7.4)
NEUTROPHILS NFR BLD AUTO: 45.7 % — SIGNIFICANT CHANGE UP (ref 43–77)
PLATELET # BLD AUTO: 245 K/UL — SIGNIFICANT CHANGE UP (ref 150–400)
POTASSIUM SERPL-MCNC: 3.4 MMOL/L — LOW (ref 3.5–5.3)
POTASSIUM SERPL-SCNC: 3.4 MMOL/L — LOW (ref 3.5–5.3)
PROT SERPL-MCNC: 7.4 GM/DL — SIGNIFICANT CHANGE UP (ref 6–8.3)
PROTHROM AB SERPL-ACNC: 11.7 SEC — SIGNIFICANT CHANGE UP (ref 9.8–12.7)
RBC # BLD: 5.64 M/UL — SIGNIFICANT CHANGE UP (ref 4.2–5.8)
RBC # FLD: 11.1 % — SIGNIFICANT CHANGE UP (ref 10.3–14.5)
SODIUM SERPL-SCNC: 136 MMOL/L — SIGNIFICANT CHANGE UP (ref 135–145)
TROPONIN I SERPL-MCNC: <0.015 NG/ML — SIGNIFICANT CHANGE UP (ref 0.01–0.04)
WBC # BLD: 6.4 K/UL — SIGNIFICANT CHANGE UP (ref 3.8–10.5)
WBC # FLD AUTO: 6.4 K/UL — SIGNIFICANT CHANGE UP (ref 3.8–10.5)

## 2018-02-12 RX ORDER — SODIUM CHLORIDE 9 MG/ML
3 INJECTION INTRAMUSCULAR; INTRAVENOUS; SUBCUTANEOUS ONCE
Qty: 0 | Refills: 0 | Status: COMPLETED | OUTPATIENT
Start: 2018-02-12 | End: 2018-02-12

## 2018-02-12 RX ADMIN — SODIUM CHLORIDE 3 MILLILITER(S): 9 INJECTION INTRAMUSCULAR; INTRAVENOUS; SUBCUTANEOUS at 00:05

## 2018-02-12 NOTE — ED ADULT NURSE NOTE - CHIEF COMPLAINT QUOTE
chest tightness started 1 week ago, was seen in ED friday and was given blood pressure medication. no c/o chest tightness and difficulty breathing with dizziness and nausea. took BP medications as scheduled today

## 2018-02-12 NOTE — ED PROVIDER NOTE - OBJECTIVE STATEMENT
41 y/o male in ED c/o sudden onset of palpitations with elevated BP tonight.  pt states on BP meds.  states tonight took his BP before taking his meds and noted BP high.  pt states took his meds and repeat BP was still high.  states got concerned and then started with palpitations and dizziness.   pt denies any fever, HA, sob, n/v/d/abd pain.  pt states symptoms now improved.  PMD at [facility]

## 2018-02-12 NOTE — ED PROVIDER NOTE - MEDICAL DECISION MAKING DETAILS
pt with palpitations and dizziness after taking BP at home that read high.  seen in ED 3 days ago for same with negative w/u.  will check repeat labs and reeval

## 2018-03-20 ENCOUNTER — APPOINTMENT (OUTPATIENT)
Dept: CARDIOLOGY | Facility: HOSPITAL | Age: 43
End: 2018-03-20

## 2018-03-20 ENCOUNTER — OUTPATIENT (OUTPATIENT)
Dept: OUTPATIENT SERVICES | Facility: HOSPITAL | Age: 43
LOS: 1 days | End: 2018-03-20
Payer: SELF-PAY

## 2018-03-20 ENCOUNTER — NON-APPOINTMENT (OUTPATIENT)
Age: 43
End: 2018-03-20

## 2018-03-20 VITALS — HEART RATE: 69 BPM | DIASTOLIC BLOOD PRESSURE: 93 MMHG | SYSTOLIC BLOOD PRESSURE: 165 MMHG | OXYGEN SATURATION: 99 %

## 2018-03-20 DIAGNOSIS — Z98.890 OTHER SPECIFIED POSTPROCEDURAL STATES: Chronic | ICD-10-CM

## 2018-03-20 DIAGNOSIS — I10 ESSENTIAL (PRIMARY) HYPERTENSION: ICD-10-CM

## 2018-03-20 DIAGNOSIS — I25.10 ATHEROSCLEROTIC HEART DISEASE OF NATIVE CORONARY ARTERY WITHOUT ANGINA PECTORIS: ICD-10-CM

## 2018-03-20 PROCEDURE — 93005 ELECTROCARDIOGRAM TRACING: CPT

## 2018-03-20 PROCEDURE — G0463: CPT

## 2018-03-20 RX ORDER — AMLODIPINE BESYLATE 10 MG/1
10 TABLET ORAL
Refills: 0 | Status: ACTIVE | COMMUNITY

## 2018-03-20 RX ORDER — CHLORTHALIDONE 25 MG/1
25 TABLET ORAL DAILY
Qty: 30 | Refills: 5 | Status: ACTIVE | COMMUNITY
Start: 2018-03-20 | End: 1900-01-01

## 2018-03-22 DIAGNOSIS — I10 ESSENTIAL (PRIMARY) HYPERTENSION: ICD-10-CM

## 2018-03-25 LAB
ANION GAP SERPL CALC-SCNC: 24 MMOL/L
BUN SERPL-MCNC: 15 MG/DL
CALCIUM SERPL-MCNC: 10 MG/DL
CHLORIDE SERPL-SCNC: 103 MMOL/L
CO2 SERPL-SCNC: 16 MMOL/L
CREAT SERPL-MCNC: 1.11 MG/DL
GLUCOSE SERPL-MCNC: 84 MG/DL
POTASSIUM SERPL-SCNC: 4.2 MMOL/L
SODIUM SERPL-SCNC: 143 MMOL/L

## 2018-04-03 ENCOUNTER — APPOINTMENT (OUTPATIENT)
Dept: CARDIOLOGY | Facility: HOSPITAL | Age: 43
End: 2018-04-03

## 2018-11-23 NOTE — ED PROCEDURE NOTE - CPROC ED ANATOMIC LOCATION1
I spoke with Mr. Gay and explained to him in detail that they have a BCBS PPO Plan called [employer], which we’re not in network with. Mr. Gay wanted to know if they could be listed as a self-pay. I further explained to Mr. Gay that we could not list him as a self-pay because they do have primary insurance. However, I did explain to Mr. Gay that  if they keep their apt. with Dr. Fernández we’d still have to submit the claim to the insurance. The insurance will deny the claim and they will essentially be responsible for 100% of the total cost of the visit in addition to their deductible, which we have been told is very high. I told Mr. Gay I could provide him with our Tax ID & NPI numbers and the CPT codes for the office visit, 2D ECHO & EKG to provide to his insurance company so they can let him know how much he’ll be paying out of pocket. After explaining all of this to Mr. Gay, he wanted to cancel the apt. He thanked me for explaining everything to him in such great detail. He said that he’s going to call his insurance company to see who is in their network.         Electronically signed by:Myrtle Hawley   Aug  1 2017  5:45PM CST Author     Electronically signed by:Ofelia Carter   Jan 2 2018 10:37AM CST    
left shoulder/left

## 2019-02-10 NOTE — ED PROVIDER NOTE - PSYCHIATRIC, MLM
10
Alert and oriented to person, place, time/situation. normal mood and affect. no apparent risk to self or others.

## 2019-10-22 NOTE — ED PROVIDER NOTE - RESPIRATORY [-], MLM
no cough Bi-Rhombic Flap Text: The defect edges were debeveled with a #15 scalpel blade.  Given the location of the defect and the proximity to free margins a bi-rhombic flap was deemed most appropriate.  Using a sterile surgical marker, an appropriate rhombic flap was drawn incorporating the defect. The area thus outlined was incised deep to adipose tissue with a #15 scalpel blade.  The skin margins were undermined to an appropriate distance in all directions utilizing iris scissors.

## 2020-04-03 ENCOUNTER — OUTPATIENT (OUTPATIENT)
Dept: OUTPATIENT SERVICES | Facility: HOSPITAL | Age: 45
LOS: 1 days | End: 2020-04-03
Payer: SELF-PAY

## 2020-04-03 ENCOUNTER — APPOINTMENT (OUTPATIENT)
Dept: ULTRASOUND IMAGING | Facility: CLINIC | Age: 45
End: 2020-04-03
Payer: COMMERCIAL

## 2020-04-03 DIAGNOSIS — Z98.890 OTHER SPECIFIED POSTPROCEDURAL STATES: Chronic | ICD-10-CM

## 2020-04-03 PROBLEM — E78.5 HYPERLIPIDEMIA, UNSPECIFIED: Chronic | Status: ACTIVE | Noted: 2017-05-17

## 2020-04-03 PROBLEM — K29.70 GASTRITIS, UNSPECIFIED, WITHOUT BLEEDING: Chronic | Status: ACTIVE | Noted: 2017-05-17

## 2020-04-03 PROBLEM — I10 ESSENTIAL (PRIMARY) HYPERTENSION: Chronic | Status: ACTIVE | Noted: 2017-04-08

## 2020-04-03 PROCEDURE — 76700 US EXAM ABDOM COMPLETE: CPT

## 2020-04-03 PROCEDURE — 76700 US EXAM ABDOM COMPLETE: CPT | Mod: 26

## 2020-04-05 ENCOUNTER — EMERGENCY (EMERGENCY)
Facility: HOSPITAL | Age: 45
LOS: 0 days | Discharge: HOME | End: 2020-04-05
Attending: EMERGENCY MEDICINE
Payer: MEDICAID

## 2020-04-05 VITALS
DIASTOLIC BLOOD PRESSURE: 109 MMHG | TEMPERATURE: 100 F | HEART RATE: 131 BPM | RESPIRATION RATE: 18 BRPM | SYSTOLIC BLOOD PRESSURE: 148 MMHG | OXYGEN SATURATION: 95 % | WEIGHT: 143.96 LBS | HEIGHT: 65 IN

## 2020-04-05 VITALS
DIASTOLIC BLOOD PRESSURE: 88 MMHG | RESPIRATION RATE: 16 BRPM | TEMPERATURE: 100 F | HEART RATE: 91 BPM | SYSTOLIC BLOOD PRESSURE: 118 MMHG | OXYGEN SATURATION: 98 %

## 2020-04-05 VITALS
OXYGEN SATURATION: 96 % | RESPIRATION RATE: 18 BRPM | TEMPERATURE: 100 F | SYSTOLIC BLOOD PRESSURE: 151 MMHG | DIASTOLIC BLOOD PRESSURE: 113 MMHG | HEART RATE: 126 BPM

## 2020-04-05 VITALS — HEART RATE: 108 BPM | OXYGEN SATURATION: 98 %

## 2020-04-05 DIAGNOSIS — Z96.612 PRESENCE OF LEFT ARTIFICIAL SHOULDER JOINT: ICD-10-CM

## 2020-04-05 DIAGNOSIS — I10 ESSENTIAL (PRIMARY) HYPERTENSION: ICD-10-CM

## 2020-04-05 DIAGNOSIS — R50.9 FEVER, UNSPECIFIED: ICD-10-CM

## 2020-04-05 DIAGNOSIS — Z98.890 OTHER SPECIFIED POSTPROCEDURAL STATES: Chronic | ICD-10-CM

## 2020-04-05 DIAGNOSIS — Z98.890 OTHER SPECIFIED POSTPROCEDURAL STATES: ICD-10-CM

## 2020-04-05 DIAGNOSIS — Z79.83 LONG TERM (CURRENT) USE OF BISPHOSPHONATES: ICD-10-CM

## 2020-04-05 DIAGNOSIS — K21.9 GASTRO-ESOPHAGEAL REFLUX DISEASE WITHOUT ESOPHAGITIS: ICD-10-CM

## 2020-04-05 DIAGNOSIS — R00.2 PALPITATIONS: ICD-10-CM

## 2020-04-05 DIAGNOSIS — E78.5 HYPERLIPIDEMIA, UNSPECIFIED: ICD-10-CM

## 2020-04-05 DIAGNOSIS — R07.9 CHEST PAIN, UNSPECIFIED: ICD-10-CM

## 2020-04-05 DIAGNOSIS — Z79.1 LONG TERM (CURRENT) USE OF NON-STEROIDAL ANTI-INFLAMMATORIES (NSAID): ICD-10-CM

## 2020-04-05 DIAGNOSIS — R00.0 TACHYCARDIA, UNSPECIFIED: ICD-10-CM

## 2020-04-05 DIAGNOSIS — Z79.810 LONG TERM (CURRENT) USE OF SELECTIVE ESTROGEN RECEPTOR MODULATORS (SERMS): ICD-10-CM

## 2020-04-05 DIAGNOSIS — R10.13 EPIGASTRIC PAIN: ICD-10-CM

## 2020-04-05 DIAGNOSIS — F41.9 ANXIETY DISORDER, UNSPECIFIED: ICD-10-CM

## 2020-04-05 DIAGNOSIS — Z79.899 OTHER LONG TERM (CURRENT) DRUG THERAPY: ICD-10-CM

## 2020-04-05 DIAGNOSIS — R51 HEADACHE: ICD-10-CM

## 2020-04-05 LAB
ALBUMIN SERPL ELPH-MCNC: 3.8 G/DL — SIGNIFICANT CHANGE UP (ref 3.3–5)
ALP SERPL-CCNC: 84 U/L — SIGNIFICANT CHANGE UP (ref 40–120)
ALT FLD-CCNC: 35 U/L — SIGNIFICANT CHANGE UP (ref 12–78)
ANION GAP SERPL CALC-SCNC: 7 MMOL/L — SIGNIFICANT CHANGE UP (ref 5–17)
APTT BLD: 37.8 SEC — HIGH (ref 27.5–36.3)
AST SERPL-CCNC: 27 U/L — SIGNIFICANT CHANGE UP (ref 15–37)
BASOPHILS # BLD AUTO: 0.01 K/UL — SIGNIFICANT CHANGE UP (ref 0–0.2)
BASOPHILS NFR BLD AUTO: 0.3 % — SIGNIFICANT CHANGE UP (ref 0–2)
BILIRUB SERPL-MCNC: 0.3 MG/DL — SIGNIFICANT CHANGE UP (ref 0.2–1.2)
BUN SERPL-MCNC: 12 MG/DL — SIGNIFICANT CHANGE UP (ref 7–23)
CALCIUM SERPL-MCNC: 8.7 MG/DL — SIGNIFICANT CHANGE UP (ref 8.5–10.1)
CHLORIDE SERPL-SCNC: 103 MMOL/L — SIGNIFICANT CHANGE UP (ref 96–108)
CO2 SERPL-SCNC: 25 MMOL/L — SIGNIFICANT CHANGE UP (ref 22–31)
CREAT SERPL-MCNC: 0.88 MG/DL — SIGNIFICANT CHANGE UP (ref 0.5–1.3)
EOSINOPHIL # BLD AUTO: 0.01 K/UL — SIGNIFICANT CHANGE UP (ref 0–0.5)
EOSINOPHIL NFR BLD AUTO: 0.3 % — SIGNIFICANT CHANGE UP (ref 0–6)
GLUCOSE SERPL-MCNC: 133 MG/DL — HIGH (ref 70–99)
HCT VFR BLD CALC: 47.9 % — SIGNIFICANT CHANGE UP (ref 39–50)
HGB BLD-MCNC: 17.1 G/DL — HIGH (ref 13–17)
IMM GRANULOCYTES NFR BLD AUTO: 0 % — SIGNIFICANT CHANGE UP (ref 0–1.5)
INR BLD: 1.14 RATIO — SIGNIFICANT CHANGE UP (ref 0.88–1.16)
LIDOCAIN IGE QN: 173 U/L — SIGNIFICANT CHANGE UP (ref 73–393)
LYMPHOCYTES # BLD AUTO: 0.55 K/UL — LOW (ref 1–3.3)
LYMPHOCYTES # BLD AUTO: 14.3 % — SIGNIFICANT CHANGE UP (ref 13–44)
MCHC RBC-ENTMCNC: 30.5 PG — SIGNIFICANT CHANGE UP (ref 27–34)
MCHC RBC-ENTMCNC: 35.7 GM/DL — SIGNIFICANT CHANGE UP (ref 32–36)
MCV RBC AUTO: 85.5 FL — SIGNIFICANT CHANGE UP (ref 80–100)
MONOCYTES # BLD AUTO: 0.3 K/UL — SIGNIFICANT CHANGE UP (ref 0–0.9)
MONOCYTES NFR BLD AUTO: 7.8 % — SIGNIFICANT CHANGE UP (ref 2–14)
NEUTROPHILS # BLD AUTO: 2.97 K/UL — SIGNIFICANT CHANGE UP (ref 1.8–7.4)
NEUTROPHILS NFR BLD AUTO: 77.3 % — HIGH (ref 43–77)
PLATELET # BLD AUTO: 204 K/UL — SIGNIFICANT CHANGE UP (ref 150–400)
POTASSIUM SERPL-MCNC: 4 MMOL/L — SIGNIFICANT CHANGE UP (ref 3.5–5.3)
POTASSIUM SERPL-SCNC: 4 MMOL/L — SIGNIFICANT CHANGE UP (ref 3.5–5.3)
PROT SERPL-MCNC: 7.6 GM/DL — SIGNIFICANT CHANGE UP (ref 6–8.3)
PROTHROM AB SERPL-ACNC: 12.7 SEC — SIGNIFICANT CHANGE UP (ref 10–12.9)
RBC # BLD: 5.6 M/UL — SIGNIFICANT CHANGE UP (ref 4.2–5.8)
RBC # FLD: 11.8 % — SIGNIFICANT CHANGE UP (ref 10.3–14.5)
SODIUM SERPL-SCNC: 135 MMOL/L — SIGNIFICANT CHANGE UP (ref 135–145)
T3 SERPL-MCNC: 146 NG/DL — SIGNIFICANT CHANGE UP (ref 80–200)
T4 AB SER-ACNC: 8.7 UG/DL — SIGNIFICANT CHANGE UP (ref 4.6–12)
TROPONIN I SERPL-MCNC: <0.015 NG/ML — SIGNIFICANT CHANGE UP (ref 0.01–0.04)
TSH SERPL-MCNC: 1.02 UU/ML — SIGNIFICANT CHANGE UP (ref 0.34–4.82)
WBC # BLD: 3.84 K/UL — SIGNIFICANT CHANGE UP (ref 3.8–10.5)
WBC # FLD AUTO: 3.84 K/UL — SIGNIFICANT CHANGE UP (ref 3.8–10.5)

## 2020-04-05 PROCEDURE — 71045 X-RAY EXAM CHEST 1 VIEW: CPT

## 2020-04-05 PROCEDURE — 99284 EMERGENCY DEPT VISIT MOD MDM: CPT | Mod: 25

## 2020-04-05 PROCEDURE — 99283 EMERGENCY DEPT VISIT LOW MDM: CPT

## 2020-04-05 PROCEDURE — 85730 THROMBOPLASTIN TIME PARTIAL: CPT

## 2020-04-05 PROCEDURE — 93010 ELECTROCARDIOGRAM REPORT: CPT | Mod: 76

## 2020-04-05 PROCEDURE — 84480 ASSAY TRIIODOTHYRONINE (T3): CPT

## 2020-04-05 PROCEDURE — 84443 ASSAY THYROID STIM HORMONE: CPT

## 2020-04-05 PROCEDURE — 36415 COLL VENOUS BLD VENIPUNCTURE: CPT

## 2020-04-05 PROCEDURE — 85610 PROTHROMBIN TIME: CPT

## 2020-04-05 PROCEDURE — 80053 COMPREHEN METABOLIC PANEL: CPT

## 2020-04-05 PROCEDURE — 84484 ASSAY OF TROPONIN QUANT: CPT

## 2020-04-05 PROCEDURE — 93005 ELECTROCARDIOGRAM TRACING: CPT

## 2020-04-05 PROCEDURE — 99284 EMERGENCY DEPT VISIT MOD MDM: CPT

## 2020-04-05 PROCEDURE — 83690 ASSAY OF LIPASE: CPT

## 2020-04-05 PROCEDURE — 99285 EMERGENCY DEPT VISIT HI MDM: CPT

## 2020-04-05 PROCEDURE — 93010 ELECTROCARDIOGRAM REPORT: CPT

## 2020-04-05 PROCEDURE — 71045 X-RAY EXAM CHEST 1 VIEW: CPT | Mod: 26

## 2020-04-05 PROCEDURE — 85025 COMPLETE CBC W/AUTO DIFF WBC: CPT

## 2020-04-05 PROCEDURE — 96374 THER/PROPH/DIAG INJ IV PUSH: CPT

## 2020-04-05 PROCEDURE — 84436 ASSAY OF TOTAL THYROXINE: CPT

## 2020-04-05 RX ORDER — ACETAMINOPHEN 500 MG
1000 TABLET ORAL ONCE
Refills: 0 | Status: COMPLETED | OUTPATIENT
Start: 2020-04-05 | End: 2020-04-05

## 2020-04-05 RX ORDER — ZOLPIDEM TARTRATE 10 MG/1
1 TABLET ORAL
Qty: 3 | Refills: 0
Start: 2020-04-05 | End: 2020-04-07

## 2020-04-05 RX ORDER — ALBUTEROL 90 UG/1
2 AEROSOL, METERED ORAL ONCE
Refills: 0 | Status: COMPLETED | OUTPATIENT
Start: 2020-04-05 | End: 2020-04-05

## 2020-04-05 RX ORDER — ACETAMINOPHEN 500 MG
1000 TABLET ORAL ONCE
Refills: 0 | Status: DISCONTINUED | OUTPATIENT
Start: 2020-04-05 | End: 2020-04-05

## 2020-04-05 RX ORDER — FAMOTIDINE 10 MG/ML
20 INJECTION INTRAVENOUS ONCE
Refills: 0 | Status: COMPLETED | OUTPATIENT
Start: 2020-04-05 | End: 2020-04-05

## 2020-04-05 RX ADMIN — FAMOTIDINE 20 MILLIGRAM(S): 10 INJECTION INTRAVENOUS at 09:57

## 2020-04-05 RX ADMIN — Medication 1000 MILLIGRAM(S): at 22:19

## 2020-04-05 RX ADMIN — ALBUTEROL 2 PUFF(S): 90 AEROSOL, METERED ORAL at 22:19

## 2020-04-05 NOTE — ED PROVIDER NOTE - PROGRESS NOTE DETAILS
43 y/o M presents with chest pain and fever x 1 day. pt reports chest pain, palpitations, fever, sweats and dizziness. Seen in ED this morning for chest pain. Denies n/v/d, SOB, abd pain or other complaints at this time. -Cesar Cortes PA-C Likely covid, supportive measure discussed with pt.  Discussed importance of close FU with PMD. Pt asked to return to ED immediately for any new or concerning sx or worsening. Pt acknowledges and understands plan -Cesar Cortes PA-C

## 2020-04-05 NOTE — ED PROVIDER NOTE - PROGRESS NOTE DETAILS
CXR/Labs unremarkable.  Outpatient US results reviewed and are normal  Patient feels better -- will d/c with outpt f/u

## 2020-04-05 NOTE — ED PROVIDER NOTE - OBJECTIVE STATEMENT
44 YOM w/ PMHx of gastritis w/o bleeding, HLD, HTN presents to the ED c/o epigastric burning, nausea, HA, palpitations, and difficulty sleeping onset x3 days. Pt went to PCP and started taking amlodipine and omeprazole to no relief. Pt suspects cause of symptoms to be stress related.

## 2020-04-05 NOTE — ED PROVIDER NOTE - PATIENT PORTAL LINK FT
You can access the FollowMyHealth Patient Portal offered by [facility] by registering at the following website: [email or web address]. By joining [facility] [facility]’s FollowMyHealth portal, you will also be able to view your health information using other applications (apps) compatible with our system.

## 2020-04-05 NOTE — ED PROVIDER NOTE - CONSTITUTIONAL, MLM
normal... Well appearing, awake, alert, oriented to person, place, time/situation; anxious; diaphoretic

## 2020-04-05 NOTE — ED ADULT TRIAGE NOTE - CHIEF COMPLAINT QUOTE
HTN, HA, abd pain and diff sleeping x 3 days. Seen by PCP and started taking amlodipine and omeprazole with no relief. Denies fever at home. Pt states "I can't sleep at night because of head pressure." + nausea and vomiting.

## 2020-04-05 NOTE — ED PROVIDER NOTE - CLINICAL SUMMARY MEDICAL DECISION MAKING FREE TEXT BOX
Had prior ED visit with normal EKG, CXR, labs.  Now with low grade fever and palpitations and chest pain.  Tylenol to be given, albuterol for symptom relief and covid swab to be sent.

## 2020-04-05 NOTE — ED ADULT NURSE NOTE - OBJECTIVE STATEMENT
patient states he was seen by PCP on Friday for HA and was told he was hypertensive, he was given amlodipine and omeprazole.  He states he was initially dx 3 yrs go, placed on medication he does not recall, but stopped bcs he was feeling better.  He reports 3 days of HA, nausea and vomiting, unable to hold down food and liquid.  He denies fever/chills.  last episode of vomiting yesterday.  Substernal CP, non exertional.  He reports HA, 10/10, with dizziness.  No contact with Covid+

## 2020-04-05 NOTE — ED PROVIDER NOTE - OBJECTIVE STATEMENT
Pt. is a 45 yo M with a hx of GERD, HTN, anxiety presenting with chest pain , heart racing, low grade fever, sweats, dizziness X 1 day.  He states he was in the ED earlier for chest pain and had EKG, chest Xray and bloodwork done which were all normal.  No known sick contacts or travel.

## 2020-04-05 NOTE — ED PROVIDER NOTE - CARE PLAN
Principal Discharge DX:	HTN (hypertension) Principal Discharge DX:	HTN (hypertension)  Secondary Diagnosis:	Anxiety

## 2020-04-05 NOTE — ED PROVIDER NOTE - ATTENDING CONTRIBUTION TO CARE
Attending Contribution to Care: I, Dalila Smith, performed the initial face to face bedside interview with this patient regarding history of present illness, review of symptoms and relevant past medical, social and family history.  I completed an independent physical examination.  I was the initial provider who evaluated this patient. I have signed out the follow up of any pending tests (i.e. labs, radiological studies) to the ACP.  I have communicated the patient’s plan of care and disposition with the ACP.

## 2020-04-05 NOTE — ED ADULT TRIAGE NOTE - CHIEF COMPLAINT QUOTE
Patient presents with chest pain reports he was here this morning with the same presenting symptoms patient states pain has worsened

## 2020-04-05 NOTE — ED PROVIDER NOTE - NSFOLLOWUPINSTRUCTIONS_ED_ALL_ED_FT
Follow-up with your regular physician within the next 1-2 weeks  Return with any persistent/worsening symptoms.   Continue your current medications

## 2020-04-07 LAB — SARS-COV-2 RNA SPEC QL NAA+PROBE: SIGNIFICANT CHANGE UP

## 2020-04-10 ENCOUNTER — EMERGENCY (EMERGENCY)
Facility: HOSPITAL | Age: 45
LOS: 0 days | Discharge: HOME | End: 2020-04-10
Attending: STUDENT IN AN ORGANIZED HEALTH CARE EDUCATION/TRAINING PROGRAM
Payer: MEDICAID

## 2020-04-10 VITALS
RESPIRATION RATE: 18 BRPM | TEMPERATURE: 99 F | SYSTOLIC BLOOD PRESSURE: 143 MMHG | DIASTOLIC BLOOD PRESSURE: 94 MMHG | OXYGEN SATURATION: 96 % | HEART RATE: 118 BPM

## 2020-04-10 VITALS — HEIGHT: 62 IN | WEIGHT: 151.9 LBS

## 2020-04-10 DIAGNOSIS — R42 DIZZINESS AND GIDDINESS: ICD-10-CM

## 2020-04-10 DIAGNOSIS — I10 ESSENTIAL (PRIMARY) HYPERTENSION: ICD-10-CM

## 2020-04-10 DIAGNOSIS — R00.0 TACHYCARDIA, UNSPECIFIED: ICD-10-CM

## 2020-04-10 DIAGNOSIS — Z98.890 OTHER SPECIFIED POSTPROCEDURAL STATES: Chronic | ICD-10-CM

## 2020-04-10 DIAGNOSIS — F41.9 ANXIETY DISORDER, UNSPECIFIED: ICD-10-CM

## 2020-04-10 DIAGNOSIS — E78.5 HYPERLIPIDEMIA, UNSPECIFIED: ICD-10-CM

## 2020-04-10 DIAGNOSIS — G47.00 INSOMNIA, UNSPECIFIED: ICD-10-CM

## 2020-04-10 PROCEDURE — 93005 ELECTROCARDIOGRAM TRACING: CPT

## 2020-04-10 PROCEDURE — 93010 ELECTROCARDIOGRAM REPORT: CPT

## 2020-04-10 PROCEDURE — 99283 EMERGENCY DEPT VISIT LOW MDM: CPT

## 2020-04-10 NOTE — ED STATDOCS - NSFOLLOWUPINSTRUCTIONS_ED_ALL_ED_FT
Insomnia  Insomnia is a sleep disorder that makes it difficult to fall asleep or stay asleep. Insomnia can cause fatigue, low energy, difficulty concentrating, mood swings, and poor performance at work or school.  There are three different ways to classify insomnia:  Difficulty falling asleep.Difficulty staying asleep.Waking up too early in the morning.Any type of insomnia can be long-term (chronic) or short-term (acute). Both are common. Short-term insomnia usually lasts for three months or less. Chronic insomnia occurs at least three times a week for longer than three months.  What are the causes?  Insomnia may be caused by another condition, situation, or substance, such as:  Anxiety.Certain medicines.Gastroesophageal reflux disease (GERD) or other gastrointestinal conditions.Asthma or other breathing conditions.Restless legs syndrome, sleep apnea, or other sleep disorders.Chronic pain.Menopause.Stroke.Abuse of alcohol, tobacco, or illegal drugs.Mental health conditions, such as depression.Caffeine.Neurological disorders, such as Alzheimer's disease.An overactive thyroid (hyperthyroidism).Sometimes, the cause of insomnia may not be known.  What increases the risk?  Risk factors for insomnia include:  Gender. Women are affected more often than men.Age. Insomnia is more common as you get older.Stress.Lack of exercise.Irregular work schedule or working night shifts.Traveling between different time zones.Certain medical and mental health conditions.What are the signs or symptoms?  If you have insomnia, the main symptom is having trouble falling asleep or having trouble staying asleep. This may lead to other symptoms, such as:  Feeling fatigued or having low energy.Feeling nervous about going to sleep.Not feeling rested in the morning.Having trouble concentrating.Feeling irritable, anxious, or depressed.How is this diagnosed?  This condition may be diagnosed based on:  Your symptoms and medical history. Your health care provider may ask about:  Your sleep habits.Any medical conditions you have.Your mental health.A physical exam.How is this treated?  Treatment for insomnia depends on the cause. Treatment may focus on treating an underlying condition that is causing insomnia. Treatment may also include:  Medicines to help you sleep.Counseling or therapy.Lifestyle adjustments to help you sleep better.Follow these instructions at home:  Eating and drinking        Limit or avoid alcohol, caffeinated beverages, and cigarettes, especially close to bedtime. These can disrupt your sleep.Do not eat a large meal or eat spicy foods right before bedtime. This can lead to digestive discomfort that can make it hard for you to sleep.Sleep habits        Keep a sleep diary to help you and your health care provider figure out what could be causing your insomnia. Write down:  When you sleep.When you wake up during the night.How well you sleep.How rested you feel the next day.Any side effects of medicines you are taking.What you eat and drink.Make your bedroom a dark, comfortable place where it is easy to fall asleep.  Put up shades or blackout curtains to block light from outside.Use a white noise machine to block noise.Keep the temperature cool.Limit screen use before bedtime. This includes:  Watching TV.Using your smartphone, tablet, or computer.Stick to a routine that includes going to bed and waking up at the same times every day and night. This can help you fall asleep faster. Consider making a quiet activity, such as reading, part of your nighttime routine.Try to avoid taking naps during the day so that you sleep better at night.Get out of bed if you are still awake after 15 minutes of trying to sleep. Keep the lights down, but try reading or doing a quiet activity. When you feel sleepy, go back to bed.General instructions     Take over-the-counter and prescription medicines only as told by your health care provider.Exercise regularly, as told by your health care provider. Avoid exercise starting several hours before bedtime.Use relaxation techniques to manage stress. Ask your health care provider to suggest some techniques that may work well for you. These may include:  Breathing exercises.Routines to release muscle tension.Visualizing peaceful scenes.Make sure that you drive carefully. Avoid driving if you feel very sleepy.Keep all follow-up visits as told by your health care provider. This is important.Contact a health care provider if:  You are tired throughout the day.You have trouble in your daily routine due to sleepiness.You continue to have sleep problems, or your sleep problems get worse.Get help right away if:  You have serious thoughts about hurting yourself or someone else.If you ever feel like you may hurt yourself or others, or have thoughts about taking your own life, get help right away. You can go to your nearest emergency department or call:   Your local emergency services (911 in the [place] A suicide crisis helpline, such as the National Suicide Prevention Lifeline at 1-321.990.8215. This is open 24 hours a day. Summary  Insomnia is a sleep disorder that makes it difficult to fall asleep or stay asleep.Insomnia can be long-term (chronic) or short-term (acute).Treatment for insomnia depends on the cause. Treatment may focus on treating an underlying condition that is causing insomnia.Keep a sleep diary to help you and your health care provider figure out what could be causing your insomnia.This information is not intended to replace advice given to you by your health care provider. Make sure you discuss any questions you have with your health care provider.

## 2020-04-10 NOTE — ED STATDOCS - PROGRESS NOTE DETAILS
Patient seen and evaluated with ED attending at intake.  Well appearing with same complaints as previous visit that had a negative workup.  SUspect anxiousness as a cause of insomnia and seconadry general malaise.  Will refer for psych follow up.  Ways to improve sleep were reviewed with the patient -Kell Roper PA-C

## 2020-04-10 NOTE — ED ADULT TRIAGE NOTE - CHIEF COMPLAINT QUOTE
pt hx htn c/o dizziness and palpitations for past few days, pt states "I feel worried about the covid virus, have not sleeping for past 10 days."

## 2020-04-10 NOTE — ED STATDOCS - PATIENT PORTAL LINK FT
You can access the FollowMyHealth Patient Portal offered by [facility] by registering at the following website: [email or web address]. By joining [employer]’s FollowMyHealth portal, you will also be able to view your health information using other applications (apps) compatible with our system.

## 2020-04-10 NOTE — ED STATDOCS - PRIMARY CARE PROVIDER
RN called patient and left a message notifying her per Dr. Hodge, \"Tell patient the culture just showed normal respiratory sadi and yeast, nothing special.  I sent an Rx to [facility] for more codeine cough medication.  I don't think another antibiotic is needed.  I want her to stop the irbesartan at this time and keep an eye on her BP this coming week.\"  RN told patient to call the office if she had any questions.     Dr. Peña

## 2020-04-10 NOTE — ED STATDOCS - OBJECTIVE STATEMENT
43 y/o male with a PMHx of gastritis, HLD, HTN present to the ED c/o dizziness x7 days. Pt cannot describe dizziness further. Currently denies CP, SOB, fever, chills, cough, n/v/d, poor PO. Notes decreased sleep over anxiety of possibly getting COVID.

## 2020-04-10 NOTE — ED STATDOCS - CLINICAL SUMMARY MEDICAL DECISION MAKING FREE TEXT BOX
Well appearing 43 y/o male with now 3rd ED visit for trouble sleeping and dizziness suspect dizziness more related to poor sleep. Pt had a work up 4 days with unremarkable labs, EKG today is unchanged from previous. Discussed pt unlikely without symptoms to have COVID and any other work up at this point is fruitless and gives pt more chance to contract virus. Pt understands plan and is ambulatory and well appearing at this time and able to discharge with strict return precautions.

## 2020-04-10 NOTE — ED STATDOCS - ATTENDING CONTRIBUTION TO CARE
I, Penelope Acosta MD,  performed the initial face to face bedside interview with this patient regarding history of present illness, review of symptoms and relevant past medical, social and family history.  I completed an independent physical examination.  I was the initial provider who evaluated this patient. I have signed out the follow up of any pending tests (i.e. labs, radiological studies) to the ACP.  I have communicated the patient’s plan of care and disposition with the ACP.  The history, relevant review of systems, past medical and surgical history, medical decision making, and physical examination was documented by the scribe in my presence and I attest to the accuracy of the documentation.

## 2020-04-11 ENCOUNTER — EMERGENCY (EMERGENCY)
Facility: HOSPITAL | Age: 45
LOS: 0 days | Discharge: HOME | End: 2020-04-11
Attending: EMERGENCY MEDICINE
Payer: MEDICAID

## 2020-04-11 VITALS
RESPIRATION RATE: 18 BRPM | OXYGEN SATURATION: 96 % | SYSTOLIC BLOOD PRESSURE: 145 MMHG | HEART RATE: 109 BPM | DIASTOLIC BLOOD PRESSURE: 97 MMHG | TEMPERATURE: 99 F

## 2020-04-11 VITALS
HEART RATE: 89 BPM | SYSTOLIC BLOOD PRESSURE: 142 MMHG | OXYGEN SATURATION: 97 % | DIASTOLIC BLOOD PRESSURE: 92 MMHG | RESPIRATION RATE: 17 BRPM | TEMPERATURE: 99 F

## 2020-04-11 DIAGNOSIS — F41.9 ANXIETY DISORDER, UNSPECIFIED: ICD-10-CM

## 2020-04-11 DIAGNOSIS — R51 HEADACHE: ICD-10-CM

## 2020-04-11 DIAGNOSIS — G47.00 INSOMNIA, UNSPECIFIED: ICD-10-CM

## 2020-04-11 DIAGNOSIS — I10 ESSENTIAL (PRIMARY) HYPERTENSION: ICD-10-CM

## 2020-04-11 DIAGNOSIS — E78.5 HYPERLIPIDEMIA, UNSPECIFIED: ICD-10-CM

## 2020-04-11 DIAGNOSIS — Z98.890 OTHER SPECIFIED POSTPROCEDURAL STATES: Chronic | ICD-10-CM

## 2020-04-11 LAB
ALBUMIN SERPL ELPH-MCNC: 3.1 G/DL — LOW (ref 3.3–5)
ALP SERPL-CCNC: 97 U/L — SIGNIFICANT CHANGE UP (ref 40–120)
ALT FLD-CCNC: 96 U/L — HIGH (ref 12–78)
ANION GAP SERPL CALC-SCNC: 9 MMOL/L — SIGNIFICANT CHANGE UP (ref 5–17)
AST SERPL-CCNC: 72 U/L — HIGH (ref 15–37)
BASOPHILS # BLD AUTO: 0.01 K/UL — SIGNIFICANT CHANGE UP (ref 0–0.2)
BASOPHILS NFR BLD AUTO: 0.1 % — SIGNIFICANT CHANGE UP (ref 0–2)
BILIRUB SERPL-MCNC: 0.5 MG/DL — SIGNIFICANT CHANGE UP (ref 0.2–1.2)
BUN SERPL-MCNC: 13 MG/DL — SIGNIFICANT CHANGE UP (ref 7–23)
CALCIUM SERPL-MCNC: 8.9 MG/DL — SIGNIFICANT CHANGE UP (ref 8.5–10.1)
CHLORIDE SERPL-SCNC: 101 MMOL/L — SIGNIFICANT CHANGE UP (ref 96–108)
CO2 SERPL-SCNC: 23 MMOL/L — SIGNIFICANT CHANGE UP (ref 22–31)
CREAT SERPL-MCNC: 0.72 MG/DL — SIGNIFICANT CHANGE UP (ref 0.5–1.3)
EOSINOPHIL # BLD AUTO: 0.21 K/UL — SIGNIFICANT CHANGE UP (ref 0–0.5)
EOSINOPHIL NFR BLD AUTO: 2.8 % — SIGNIFICANT CHANGE UP (ref 0–6)
GLUCOSE SERPL-MCNC: 110 MG/DL — HIGH (ref 70–99)
HCT VFR BLD CALC: 42.6 % — SIGNIFICANT CHANGE UP (ref 39–50)
HGB BLD-MCNC: 15.1 G/DL — SIGNIFICANT CHANGE UP (ref 13–17)
IMM GRANULOCYTES NFR BLD AUTO: 0.4 % — SIGNIFICANT CHANGE UP (ref 0–1.5)
LYMPHOCYTES # BLD AUTO: 1.12 K/UL — SIGNIFICANT CHANGE UP (ref 1–3.3)
LYMPHOCYTES # BLD AUTO: 15.1 % — SIGNIFICANT CHANGE UP (ref 13–44)
MCHC RBC-ENTMCNC: 29.5 PG — SIGNIFICANT CHANGE UP (ref 27–34)
MCHC RBC-ENTMCNC: 35.4 GM/DL — SIGNIFICANT CHANGE UP (ref 32–36)
MCV RBC AUTO: 83.4 FL — SIGNIFICANT CHANGE UP (ref 80–100)
MONOCYTES # BLD AUTO: 0.37 K/UL — SIGNIFICANT CHANGE UP (ref 0–0.9)
MONOCYTES NFR BLD AUTO: 5 % — SIGNIFICANT CHANGE UP (ref 2–14)
NEUTROPHILS # BLD AUTO: 5.68 K/UL — SIGNIFICANT CHANGE UP (ref 1.8–7.4)
NEUTROPHILS NFR BLD AUTO: 76.6 % — SIGNIFICANT CHANGE UP (ref 43–77)
PLATELET # BLD AUTO: 227 K/UL — SIGNIFICANT CHANGE UP (ref 150–400)
POTASSIUM SERPL-MCNC: 3.8 MMOL/L — SIGNIFICANT CHANGE UP (ref 3.5–5.3)
POTASSIUM SERPL-SCNC: 3.8 MMOL/L — SIGNIFICANT CHANGE UP (ref 3.5–5.3)
PROT SERPL-MCNC: 7.5 GM/DL — SIGNIFICANT CHANGE UP (ref 6–8.3)
RBC # BLD: 5.11 M/UL — SIGNIFICANT CHANGE UP (ref 4.2–5.8)
RBC # FLD: 11.5 % — SIGNIFICANT CHANGE UP (ref 10.3–14.5)
SODIUM SERPL-SCNC: 133 MMOL/L — LOW (ref 135–145)
WBC # BLD: 7.42 K/UL — SIGNIFICANT CHANGE UP (ref 3.8–10.5)
WBC # FLD AUTO: 7.42 K/UL — SIGNIFICANT CHANGE UP (ref 3.8–10.5)

## 2020-04-11 PROCEDURE — 85025 COMPLETE CBC W/AUTO DIFF WBC: CPT

## 2020-04-11 PROCEDURE — 93005 ELECTROCARDIOGRAM TRACING: CPT

## 2020-04-11 PROCEDURE — 99284 EMERGENCY DEPT VISIT MOD MDM: CPT | Mod: 25

## 2020-04-11 PROCEDURE — 99284 EMERGENCY DEPT VISIT MOD MDM: CPT

## 2020-04-11 PROCEDURE — 96361 HYDRATE IV INFUSION ADD-ON: CPT

## 2020-04-11 PROCEDURE — 36415 COLL VENOUS BLD VENIPUNCTURE: CPT

## 2020-04-11 PROCEDURE — 70450 CT HEAD/BRAIN W/O DYE: CPT

## 2020-04-11 PROCEDURE — 96374 THER/PROPH/DIAG INJ IV PUSH: CPT

## 2020-04-11 PROCEDURE — 96375 TX/PRO/DX INJ NEW DRUG ADDON: CPT

## 2020-04-11 PROCEDURE — 80053 COMPREHEN METABOLIC PANEL: CPT

## 2020-04-11 PROCEDURE — 93010 ELECTROCARDIOGRAM REPORT: CPT

## 2020-04-11 PROCEDURE — 70450 CT HEAD/BRAIN W/O DYE: CPT | Mod: 26

## 2020-04-11 RX ORDER — SODIUM CHLORIDE 9 MG/ML
1000 INJECTION INTRAMUSCULAR; INTRAVENOUS; SUBCUTANEOUS ONCE
Refills: 0 | Status: COMPLETED | OUTPATIENT
Start: 2020-04-11 | End: 2020-04-11

## 2020-04-11 RX ORDER — KETOROLAC TROMETHAMINE 30 MG/ML
15 SYRINGE (ML) INJECTION ONCE
Refills: 0 | Status: DISCONTINUED | OUTPATIENT
Start: 2020-04-11 | End: 2020-04-11

## 2020-04-11 RX ORDER — DIPHENHYDRAMINE HCL 50 MG
25 CAPSULE ORAL ONCE
Refills: 0 | Status: COMPLETED | OUTPATIENT
Start: 2020-04-11 | End: 2020-04-11

## 2020-04-11 RX ORDER — METOCLOPRAMIDE HCL 10 MG
10 TABLET ORAL ONCE
Refills: 0 | Status: COMPLETED | OUTPATIENT
Start: 2020-04-11 | End: 2020-04-11

## 2020-04-11 RX ADMIN — Medication 25 MILLIGRAM(S): at 16:26

## 2020-04-11 RX ADMIN — SODIUM CHLORIDE 1000 MILLILITER(S): 9 INJECTION INTRAMUSCULAR; INTRAVENOUS; SUBCUTANEOUS at 17:15

## 2020-04-11 RX ADMIN — SODIUM CHLORIDE 1000 MILLILITER(S): 9 INJECTION INTRAMUSCULAR; INTRAVENOUS; SUBCUTANEOUS at 16:26

## 2020-04-11 RX ADMIN — Medication 15 MILLIGRAM(S): at 17:56

## 2020-04-11 RX ADMIN — Medication 10 MILLIGRAM(S): at 16:26

## 2020-04-11 NOTE — ED PROVIDER NOTE - CARE PLAN
Principal Discharge DX:	Acute intractable headache, unspecified headache type  Secondary Diagnosis:	Anxiety  Secondary Diagnosis:	Insomnia

## 2020-04-11 NOTE — ED PROVIDER NOTE - NSFOLLOWUPINSTRUCTIONS_ED_ALL_ED_FT
FOLLOW UP WITH DR TATE AND THE [facility] ON MONDAY  PLENTY OF REST  PLENTY OF FLUIDS  TRY MELATONIN TO HELP YOU SLEEP  ANY WORSENING SYMPTOMS RETURN TO THE ER

## 2020-04-11 NOTE — ED ADULT TRIAGE NOTE - CHIEF COMPLAINT QUOTE
Pt reports two weeks of headache and difficulty sleeping.  Denies any confusion, weakness, or fever/cough.

## 2020-04-11 NOTE — ED PROVIDER NOTE - PROGRESS NOTE DETAILS
discussed results with patient.  noted after first evaluation that this is patient's  visit to ED in the last week.  was seen here also yesterday, which he did not mention.  workup negative again today.  pt given resources for outpatient mental health services and should also call the [facility].  seems he has anxiety.  no SI, HI, AH, VH.  but needs follow up.  if his symptoms continue to worsen, he can return to ER.

## 2020-04-11 NOTE — ED PROVIDER NOTE - OBJECTIVE STATEMENT
43 y/o male with a PMHx of HLD, HTN not on any medications currently, presents to the ED BIBEMS c/o posterior headache midline x last night. Headache is gradually worsening, no sudden onset, no trauma. No fever, chills, cough, sore throat or URI sx. Took Tylenol but sx unrelieved by med. No lightheadedness, change in vison, change in speech, numbness, weakness. States this was proceeded by 1 week of insomnia which he never had before, hasn't slept at all in 7-10 days and not taking anything fro it. Denies drug or alcohol use. No medication use other than Tylenol. Said "there's something wrong with my brain".

## 2020-04-11 NOTE — ED PROVIDER NOTE - CHPI ED SYMPTOMS NEG
no chills, no cough, no sore throat, no lightheadedness, no change in vision, no change in speech/no fever/no numbness/no weakness

## 2020-04-11 NOTE — ED PROVIDER NOTE - PATIENT PORTAL LINK FT
You can access the FollowMyHealth Patient Portal offered by [facility] by registering at the following website: [email or web address]. By joining [employer]’s FollowMyHealth portal, you will also be able to view your health information using other applications (apps) compatible with our system.

## 2020-05-03 ENCOUNTER — EMERGENCY (EMERGENCY)
Facility: HOSPITAL | Age: 45
LOS: 0 days | Discharge: HOME | End: 2020-05-04
Attending: EMERGENCY MEDICINE
Payer: MEDICAID

## 2020-05-03 VITALS — WEIGHT: 139.99 LBS | HEIGHT: 60 IN

## 2020-05-03 DIAGNOSIS — M24.412 RECURRENT DISLOCATION, LEFT SHOULDER: ICD-10-CM

## 2020-05-03 DIAGNOSIS — E78.5 HYPERLIPIDEMIA, UNSPECIFIED: ICD-10-CM

## 2020-05-03 DIAGNOSIS — Z98.890 OTHER SPECIFIED POSTPROCEDURAL STATES: Chronic | ICD-10-CM

## 2020-05-03 DIAGNOSIS — I10 ESSENTIAL (PRIMARY) HYPERTENSION: ICD-10-CM

## 2020-05-03 DIAGNOSIS — Z87.19 PERSONAL HISTORY OF OTHER DISEASES OF THE DIGESTIVE SYSTEM: ICD-10-CM
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
## 2020-05-03 DIAGNOSIS — M25.512 PAIN IN LEFT SHOULDER: ICD-10-CM

## 2020-05-03 PROCEDURE — 23650 CLTX SHO DSLC W/MNPJ WO ANES: CPT | Mod: LT

## 2020-05-03 PROCEDURE — 73030 X-RAY EXAM OF SHOULDER: CPT | Mod: LT

## 2020-05-03 PROCEDURE — 99285 EMERGENCY DEPT VISIT HI MDM: CPT

## 2020-05-03 PROCEDURE — 73020 X-RAY EXAM OF SHOULDER: CPT | Mod: LT

## 2020-05-03 PROCEDURE — 96374 THER/PROPH/DIAG INJ IV PUSH: CPT | Mod: XU

## 2020-05-03 PROCEDURE — 23650 CLTX SHO DSLC W/MNPJ WO ANES: CPT

## 2020-05-03 PROCEDURE — 96375 TX/PRO/DX INJ NEW DRUG ADDON: CPT | Mod: XU

## 2020-05-03 PROCEDURE — 73020 X-RAY EXAM OF SHOULDER: CPT | Mod: 26,59,LT

## 2020-05-03 PROCEDURE — 73030 X-RAY EXAM OF SHOULDER: CPT | Mod: 26,LT

## 2020-05-03 PROCEDURE — 99284 EMERGENCY DEPT VISIT MOD MDM: CPT | Mod: 25

## 2020-05-03 RX ORDER — MORPHINE SULFATE 50 MG/1
4 CAPSULE, EXTENDED RELEASE ORAL ONCE
Refills: 0 | Status: DISCONTINUED | OUTPATIENT
Start: 2020-05-03 | End: 2020-05-03

## 2020-05-03 RX ORDER — ONDANSETRON 8 MG/1
4 TABLET, FILM COATED ORAL ONCE
Refills: 0 | Status: COMPLETED | OUTPATIENT
Start: 2020-05-03 | End: 2020-05-03

## 2020-05-03 RX ORDER — HYDROMORPHONE HYDROCHLORIDE 2 MG/ML
1 INJECTION INTRAMUSCULAR; INTRAVENOUS; SUBCUTANEOUS ONCE
Refills: 0 | Status: DISCONTINUED | OUTPATIENT
Start: 2020-05-03 | End: 2020-05-03

## 2020-05-03 RX ADMIN — HYDROMORPHONE HYDROCHLORIDE 1 MILLIGRAM(S): 2 INJECTION INTRAMUSCULAR; INTRAVENOUS; SUBCUTANEOUS at 23:38

## 2020-05-03 RX ADMIN — MORPHINE SULFATE 4 MILLIGRAM(S): 50 CAPSULE, EXTENDED RELEASE ORAL at 22:53

## 2020-05-03 RX ADMIN — ONDANSETRON 4 MILLIGRAM(S): 8 TABLET, FILM COATED ORAL at 22:53

## 2020-05-03 NOTE — ED PROVIDER NOTE - OBJECTIVE STATEMENT
pt reports left shoulder pain constant achy non radiating 9/10 post picking up daughter and feeling "it popped out" no left shoulder or left ghand pain no sensory defiicts no headhace no neck pain no chest pain

## 2020-05-03 NOTE — ED ADULT TRIAGE NOTE - CHIEF COMPLAINT QUOTE
pt c/o left shoulder dislocation s/p picking up 6 year old daughter 45 mins ago. Pt arrived to ED with sling to left arm. Pt states hx left shoulder dislocation 1 year ago. Pt took tylenol PTA. Hx HTN

## 2020-05-03 NOTE — ED ADULT NURSE NOTE - OBJECTIVE STATEMENT
Patient presents to ED complaining of shoulder pain. Patient complaining of L shoulder dislocation. Patient able to move L digits, denies numbness/tingling, +pulse. Patient states he was playing with daughter, reached back and felt dislocation, patient states this has happen in past. Patient rates pain 5/10. Patient denies fever, chills, NVD, cough.

## 2020-05-03 NOTE — ED PROVIDER NOTE - CLINICAL SUMMARY MEDICAL DECISION MAKING FREE TEXT BOX
post reduction pt with full rom no sensory deficits nml distal pulses ortho f/u advised with return precautions of new onset motor ro sensory deficits or intractable pain to return to ed . pt agrees to plan of care.

## 2020-05-03 NOTE — ED PROVIDER NOTE - PATIENT PORTAL LINK FT
You can access the FollowMyHealth Patient Portal offered by [facility] by registering at the following website: [email or web address]. By joining [employer]’s FollowMyHealth portal, you will also be able to view your health information using other applications (apps) compatible with our system.

## 2020-05-03 NOTE — ED PROVIDER NOTE - MUSCULOSKELETAL, MLM
+ ttp left anterior shoulder with decreased rom , 2+ radial and ulnar pulses LUE without sensory deficits

## 2020-05-04 VITALS
HEART RATE: 87 BPM | SYSTOLIC BLOOD PRESSURE: 131 MMHG | OXYGEN SATURATION: 99 % | RESPIRATION RATE: 17 BRPM | DIASTOLIC BLOOD PRESSURE: 87 MMHG | TEMPERATURE: 99 F

## 2020-05-04 NOTE — ED PROCEDURE NOTE - CPROC ED JOINT DISLOCATION DETAIL1
The anatomic location was identified, and patient was properly positioned. Using the appropriate technique, joint reduction was performed. 180.34

## 2020-10-06 NOTE — ED ADULT NURSE NOTE - FALLEN IN THE PAST
Date:  10/6/2020    Medication:  Ambien 10 mg (QTY 30)    Message to Prescriber: Pa-required    Reference #/CoverMyMeds KeyCode: [id number]    Insurance ID:  [id number] or [id number]    PA Company Phone:      Pharmacy Name:  [facility]    Pharmacy Phone/Fax Number:  [phone]    [x] PA request forwarded to nurse/provider on date 10/06/20     no

## 2020-10-14 ENCOUNTER — EMERGENCY (EMERGENCY)
Facility: HOSPITAL | Age: 45
LOS: 0 days | Discharge: HOME | End: 2020-10-14
Attending: EMERGENCY MEDICINE
Payer: MEDICAID

## 2020-10-14 VITALS
OXYGEN SATURATION: 98 % | RESPIRATION RATE: 17 BRPM | SYSTOLIC BLOOD PRESSURE: 137 MMHG | HEART RATE: 80 BPM | DIASTOLIC BLOOD PRESSURE: 91 MMHG | TEMPERATURE: 99 F

## 2020-10-14 VITALS
WEIGHT: 138.01 LBS | SYSTOLIC BLOOD PRESSURE: 139 MMHG | OXYGEN SATURATION: 97 % | DIASTOLIC BLOOD PRESSURE: 92 MMHG | RESPIRATION RATE: 18 BRPM | TEMPERATURE: 99 F | HEIGHT: 71 IN | HEART RATE: 90 BPM

## 2020-10-14 DIAGNOSIS — Z98.890 OTHER SPECIFIED POSTPROCEDURAL STATES: Chronic | ICD-10-CM

## 2020-10-14 DIAGNOSIS — R10.13 EPIGASTRIC PAIN: ICD-10-CM

## 2020-10-14 DIAGNOSIS — R07.9 CHEST PAIN, UNSPECIFIED: ICD-10-CM

## 2020-10-14 DIAGNOSIS — Z87.19 PERSONAL HISTORY OF OTHER DISEASES OF THE DIGESTIVE SYSTEM: ICD-10-CM

## 2020-10-14 DIAGNOSIS — E78.5 HYPERLIPIDEMIA, UNSPECIFIED: ICD-10-CM

## 2020-10-14 DIAGNOSIS — R11.0 NAUSEA: ICD-10-CM

## 2020-10-14 DIAGNOSIS — K21.9 GASTRO-ESOPHAGEAL REFLUX DISEASE WITHOUT ESOPHAGITIS: ICD-10-CM

## 2020-10-14 DIAGNOSIS — I10 ESSENTIAL (PRIMARY) HYPERTENSION: ICD-10-CM

## 2020-10-14 LAB
ALBUMIN SERPL ELPH-MCNC: 4 G/DL — SIGNIFICANT CHANGE UP (ref 3.3–5)
ALP SERPL-CCNC: 82 U/L — SIGNIFICANT CHANGE UP (ref 40–120)
ALT FLD-CCNC: 41 U/L — SIGNIFICANT CHANGE UP (ref 12–78)
ANION GAP SERPL CALC-SCNC: 7 MMOL/L — SIGNIFICANT CHANGE UP (ref 5–17)
AST SERPL-CCNC: 27 U/L — SIGNIFICANT CHANGE UP (ref 15–37)
BASOPHILS # BLD AUTO: 0.05 K/UL — SIGNIFICANT CHANGE UP (ref 0–0.2)
BASOPHILS NFR BLD AUTO: 0.4 % — SIGNIFICANT CHANGE UP (ref 0–2)
BILIRUB SERPL-MCNC: 0.7 MG/DL — SIGNIFICANT CHANGE UP (ref 0.2–1.2)
BUN SERPL-MCNC: 12 MG/DL — SIGNIFICANT CHANGE UP (ref 7–23)
CALCIUM SERPL-MCNC: 8.9 MG/DL — SIGNIFICANT CHANGE UP (ref 8.5–10.1)
CHLORIDE SERPL-SCNC: 105 MMOL/L — SIGNIFICANT CHANGE UP (ref 96–108)
CO2 SERPL-SCNC: 25 MMOL/L — SIGNIFICANT CHANGE UP (ref 22–31)
CREAT SERPL-MCNC: 0.92 MG/DL — SIGNIFICANT CHANGE UP (ref 0.5–1.3)
EOSINOPHIL # BLD AUTO: 1 K/UL — HIGH (ref 0–0.5)
EOSINOPHIL NFR BLD AUTO: 8.7 % — HIGH (ref 0–6)
GLUCOSE SERPL-MCNC: 125 MG/DL — HIGH (ref 70–99)
HCT VFR BLD CALC: 48.7 % — SIGNIFICANT CHANGE UP (ref 39–50)
HGB BLD-MCNC: 16.8 G/DL — SIGNIFICANT CHANGE UP (ref 13–17)
IMM GRANULOCYTES NFR BLD AUTO: 0.2 % — SIGNIFICANT CHANGE UP (ref 0–1.5)
LIDOCAIN IGE QN: 99 U/L — SIGNIFICANT CHANGE UP (ref 73–393)
LYMPHOCYTES # BLD AUTO: 28.1 % — SIGNIFICANT CHANGE UP (ref 13–44)
LYMPHOCYTES # BLD AUTO: 3.25 K/UL — SIGNIFICANT CHANGE UP (ref 1–3.3)
MCHC RBC-ENTMCNC: 29.8 PG — SIGNIFICANT CHANGE UP (ref 27–34)
MCHC RBC-ENTMCNC: 34.5 GM/DL — SIGNIFICANT CHANGE UP (ref 32–36)
MCV RBC AUTO: 86.5 FL — SIGNIFICANT CHANGE UP (ref 80–100)
MONOCYTES # BLD AUTO: 0.96 K/UL — HIGH (ref 0–0.9)
MONOCYTES NFR BLD AUTO: 8.3 % — SIGNIFICANT CHANGE UP (ref 2–14)
NEUTROPHILS # BLD AUTO: 6.28 K/UL — SIGNIFICANT CHANGE UP (ref 1.8–7.4)
NEUTROPHILS NFR BLD AUTO: 54.3 % — SIGNIFICANT CHANGE UP (ref 43–77)
PLATELET # BLD AUTO: 253 K/UL — SIGNIFICANT CHANGE UP (ref 150–400)
POTASSIUM SERPL-MCNC: 3.3 MMOL/L — LOW (ref 3.5–5.3)
POTASSIUM SERPL-SCNC: 3.3 MMOL/L — LOW (ref 3.5–5.3)
PROT SERPL-MCNC: 7.6 GM/DL — SIGNIFICANT CHANGE UP (ref 6–8.3)
RBC # BLD: 5.63 M/UL — SIGNIFICANT CHANGE UP (ref 4.2–5.8)
RBC # FLD: 12.5 % — SIGNIFICANT CHANGE UP (ref 10.3–14.5)
SODIUM SERPL-SCNC: 137 MMOL/L — SIGNIFICANT CHANGE UP (ref 135–145)
TROPONIN I SERPL-MCNC: <0.015 NG/ML — SIGNIFICANT CHANGE UP (ref 0.01–0.04)
WBC # BLD: 11.56 K/UL — HIGH (ref 3.8–10.5)
WBC # FLD AUTO: 11.56 K/UL — HIGH (ref 3.8–10.5)

## 2020-10-14 PROCEDURE — 80053 COMPREHEN METABOLIC PANEL: CPT

## 2020-10-14 PROCEDURE — 99284 EMERGENCY DEPT VISIT MOD MDM: CPT | Mod: 25

## 2020-10-14 PROCEDURE — 71045 X-RAY EXAM CHEST 1 VIEW: CPT

## 2020-10-14 PROCEDURE — 83690 ASSAY OF LIPASE: CPT

## 2020-10-14 PROCEDURE — 36415 COLL VENOUS BLD VENIPUNCTURE: CPT

## 2020-10-14 PROCEDURE — 96374 THER/PROPH/DIAG INJ IV PUSH: CPT

## 2020-10-14 PROCEDURE — 85025 COMPLETE CBC W/AUTO DIFF WBC: CPT

## 2020-10-14 PROCEDURE — 99284 EMERGENCY DEPT VISIT MOD MDM: CPT

## 2020-10-14 PROCEDURE — 93010 ELECTROCARDIOGRAM REPORT: CPT

## 2020-10-14 PROCEDURE — 84484 ASSAY OF TROPONIN QUANT: CPT

## 2020-10-14 PROCEDURE — 93005 ELECTROCARDIOGRAM TRACING: CPT

## 2020-10-14 PROCEDURE — 71045 X-RAY EXAM CHEST 1 VIEW: CPT | Mod: 26

## 2020-10-14 RX ORDER — FAMOTIDINE 10 MG/ML
20 INJECTION INTRAVENOUS ONCE
Refills: 0 | Status: COMPLETED | OUTPATIENT
Start: 2020-10-14 | End: 2020-10-14

## 2020-10-14 RX ADMIN — Medication 30 MILLILITER(S): at 01:35

## 2020-10-14 RX ADMIN — FAMOTIDINE 20 MILLIGRAM(S): 10 INJECTION INTRAVENOUS at 01:35

## 2020-10-14 NOTE — ED PROVIDER NOTE - NSFOLLOWUPINSTRUCTIONS_ED_ALL_ED_FT
Acute Abdominal Pain    WHAT YOU NEED TO KNOW:    The cause of your abdominal pain may not be found. If a cause is found, treatment will depend on what the cause is.     DISCHARGE INSTRUCTIONS:    Return to the emergency department if:     You vomit blood or cannot stop vomiting.      You have blood in your bowel movement or it looks like tar.       You have bleeding from your rectum.       Your abdomen is larger than usual, more painful, and hard.       You have severe pain in your abdomen.       You stop passing gas and having bowel movements.       You feel weak, dizzy, or faint.    Contact your healthcare provider if:     You have a fever.      You have new signs and symptoms.      Your symptoms do not get better with treatment.       You have questions or concerns about your condition or care.    Medicines may be given to decrease pain, treat an infection, and manage your symptoms. Take your medicine as directed. Call your healthcare provider if you think your medicine is not helping or if you have side effects. Tell him if you are allergic to any medicine. Keep a list of the medicines, vitamins, and herbs you take. Include the amounts, and when and why you take them. Bring the list or the pill bottles to follow-up visits. Carry your medicine list with you in case of an emergency.    Manage your symptoms:     Apply heat on your abdomen for 20 to 30 minutes every 2 hours for as many days as directed. Heat helps decrease pain and muscle spasms.       Manage your stress. Stress may cause abdominal pain. Your healthcare provider may recommend relaxation techniques and deep breathing exercises to help decrease your stress. Your healthcare provider may recommend you talk to someone about your stress or anxiety, such as a counselor or a trusted friend. Get plenty of sleep and exercise regularly.       Limit or do not drink alcohol. Alcohol can make your abdominal pain worse. Ask your healthcare provider if it is safe for you to drink alcohol. Also ask how much is safe for you to drink.       Do not smoke. Nicotine and other chemicals in cigarettes can damage your esophagus and stomach. Ask your healthcare provider for information if you currently smoke and need help to quit. E-cigarettes or smokeless tobacco still contain nicotine. Talk to your healthcare provider before you use these products.     Make changes to the food you eat as directed: Do not eat foods that cause abdominal pain or other symptoms. Eat small meals more often.     Eat more high-fiber foods if you are constipated. High-fiber foods include fruits, vegetables, whole-grain foods, and legumes.       Do not eat foods that cause gas if you have bloating. Examples include broccoli, cabbage, and cauliflower. Do not drink soda or carbonated drinks, because these may also cause gas.       Do not eat foods or drinks that contain sorbitol or fructose if you have diarrhea and bloating. Some examples are fruit juices, candy, jelly, and sugar-free gum.       Do not eat high-fat foods, such as fried foods, cheeseburgers, hot dogs, and desserts.      Limit or do not drink caffeine. Caffeine may make symptoms, such as heart burn or nausea, worse.       Drink plenty of liquids to prevent dehydration from diarrhea or vomiting. Ask your healthcare provider how much liquid to drink each day and which liquids are best for you.     Follow up with your healthcare provider as directed: Write down your questions so you remember to ask them during your visits.  Chest Pain    WHAT YOU NEED TO KNOW:    Chest pain can be caused by a range of conditions, from not serious to life-threatening. Chest pain can be a symptom of a digestive problem, such as acid reflux or a stomach ulcer. An anxiety attack or a strong emotion, such as anger, can also cause chest pain. Infection, inflammation, or a fracture in the bones or cartilage in your chest can cause pain or discomfort. Sometimes chest pain or pressure is caused by poor blood flow to your heart (angina). Chest pain may also be caused by life-threatening conditions such as a heart attack or blood clot in your lungs.     DISCHARGE INSTRUCTIONS:    Call 911 if:     You have any of the following signs of a heart attack:   Squeezing, pressure, or pain in your chest       and any of the following:   Discomfort or pain in your back, neck, jaw, stomach, or arm       Shortness of breath      Nausea or vomiting      Lightheadedness or a sudden cold sweat        Return to the emergency department if:     You have chest discomfort that gets worse, even with medicine.      You cough or vomit blood.       Your bowel movements are black or bloody.       You cannot stop vomiting, or it hurts to swallow.     Contact your healthcare provider if:     You have questions or concerns about your condition or care.        Medicines:     Medicines may be given to treat the cause of your chest pain. Examples include pain medicine, anxiety medicine, or medicines to increase blood flow to your heart.       Do not take certain medicines without asking your healthcare provider first. These include NSAIDs, herbal or vitamin supplements, or hormones (estrogen or progestin).       Take your medicine as directed. Contact your healthcare provider if you think your medicine is not helping or if you have side effects. Tell him or her if you are allergic to any medicine. Keep a list of the medicines, vitamins, and herbs you take. Include the amounts, and when and why you take them. Bring the list or the pill bottles to follow-up visits. Carry your medicine list with you in case of an emergency.    Follow up with your healthcare provider within 72 hours, or as directed: You may need to return for more tests to find the cause of your chest pain. You may be referred to a specialist, such as a cardiologist or gastroenterologist. Write down your questions so you remember to ask them during your visits.     Healthy living tips: The following are general healthy guidelines. If your chest pain is caused by a heart problem, your healthcare provider will give you specific guidelines to follow.    Do not smoke. Nicotine and other chemicals in cigarettes and cigars can cause lung and heart damage. Ask your healthcare provider for information if you currently smoke and need help to quit. E-cigarettes or smokeless tobacco still contain nicotine. Talk to your healthcare provider before you use these products.       Eat a variety of healthy, low-fat, low-salt foods. Healthy foods include fruits, vegetables, whole-grain breads, low-fat dairy products, beans, lean meats, and fish. Ask for more information about a heart healthy diet.      Drink plenty of water every day. Your body is made of mostly water. Water helps your body to control your temperature and blood pressure. Ask your healthcare provider how much water you should drink every day.      Ask about activity. Your healthcare provider will tell you which activities to limit or avoid. Ask when you can drive, return to work, and have sex. Ask about the best exercise plan for you.      Maintain a healthy weight. Ask your healthcare provider how much you should weigh. Ask him or her to help you create a weight loss plan if you are overweight.       Get the flu and pneumonia vaccines. All adults should get the influenza (flu) vaccine. Get it every year as soon as it becomes available. The pneumococcal vaccine is given to adults aged 65 years or older. The vaccine is given every 5 years to prevent pneumococcal disease, such as pneumonia.    If you have a stent:     Carry your stent card with you at all times.       Let all healthcare providers know that you have a stent.

## 2020-10-14 NOTE — ED PROVIDER NOTE - PATIENT PORTAL LINK FT
You can access the FollowMyHealth Patient Portal offered by [facility] by registering at the following website: [email or web address]. By joining [employer]’s FollowMyHealth portal, you will also be able to view your health information using other applications (apps) compatible with our system.

## 2020-10-14 NOTE — ED PROVIDER NOTE - OBJECTIVE STATEMENT
44 y M pmh of htn, hl, and gerd presenting with epigastric burning radiating to chest for last 3 days. Denies sob.  Denies f/c.  Notes some nausea, but no vomiting.  Feels like GERD to patient.  Worse with laying down, which he believes exacerbated tonight.  Denies family cardiac or personal cardiac history.  No prior history of PE/dvt.  No diarrhea.

## 2020-10-14 NOTE — ED PROVIDER NOTE - CARE PROVIDER_API CALL
Paulo Beaulieu (DO)  Cardiology  [address] [place] [address]  Phone: [phone]  Fax: [phone]  Follow Up Time: 1-3 Days    Robert Shi  GASTROENTEROLOGY  [address] [place] [address]  Phone: [phone]  Fax: [phone]  Follow Up Time: 1-3 Days

## 2020-10-14 NOTE — ED PROVIDER NOTE - PHYSICAL EXAMINATION
Constitutional: NAD, well appearing  HEENT: no rhinorrhea  CVS:  RRR, no m/r/g  Resp:  CTAB  GI: soft, ntnd  MSK:  no restriction to rom, full ROM to all extremities  Neuro:  A&Ox3, SILT to all extremities  Skin: no rash  psych: clear thought content  Heme/lymph:  No LAD

## 2020-10-14 NOTE — ED ADULT NURSE NOTE - OBJECTIVE STATEMENT
pt arrives to ED complaining of abdominal pain and chest pain. pt states two days ago pt developed nausea and abdominal pain radiating to chest. pt describes pain as "tightness." Hx HTN. alert and oriented x 4. denies diarrhea, sob.

## 2020-10-14 NOTE — ED PROVIDER NOTE - CARE PROVIDERS DIRECT ADDRESSES
[email or web address]

## 2020-10-14 NOTE — ED PROVIDER NOTE - PROVIDER TOKENS
PROVIDER:[TOKEN:[[id number]],FOLLOWUP:[1-3 Days]],PROVIDER:[TOKEN:[[id number]],FOLLOWUP:[1-3 Days]]

## 2020-10-14 NOTE — ED PROVIDER NOTE - NS ED ROS FT
Constitutional: nad, well appearing  HEENT:  no nasal congestion, eye drainage or ear pain.    CVS:  no cp  Resp:  No sob, no cough  GI:  +abd pain, +nausea  :  no dysuria  MSK: no joint pain or limited ROM  Skin: no rash  Neuro: no change in mental status or level of consciousness  Heme/lymph: no bleeding

## 2020-10-14 NOTE — ED ADULT TRIAGE NOTE - CHIEF COMPLAINT QUOTE
Pt BIBA with c/o of abdominal pain and heart burn x2days.  Pt states he went to [facility] today and was started on Omeprazole.  Pt feels symptoms are getting worse, he is unable to sleep and states he feels like hes burning inside.    H/O HTN.   Denies n/v/d, fever, sob.

## 2020-10-14 NOTE — ED PROVIDER NOTE - CLINICAL SUMMARY MEDICAL DECISION MAKING FREE TEXT BOX
Pt with mild epigastric pain described as burning radiating to chest.  Worse upon lying down.  Feels like reflux to patient.  Ongoing for last 3 days.  Low risk ACS by EKG, story, and risk factors.  HEART score 1.  Not suspicious for PE given no active cp or sob and satting well with normal HR - PERC negative.  No e/o ptx/pna/carditis.  Story not c/w dissection - below threshold for further w/u.  Soft, ntnd abdomen - not suspicious for acute abdomen.  Labs unremarkable. Trop x1 negative - sufficient for acs eval given timing of symptoms.  Pt improved on reassessment.  Comfortable with d/c home. D/c home with strict return precautions and prompt outpatient f/u.

## 2020-10-14 NOTE — ED ADULT NURSE NOTE - CHIEF COMPLAINT QUOTE
Pt BIBA with c/o of abdominal pain and heart burn x2days.  Pt states he went to [facility] today and was started on Omeprazole.  Pt feels symptoms are getting worse, he is unable to sleep and states he feels like hes burning inside.    H/O HTN.   Denies n/v/d, fever, sob.

## 2020-10-16 ENCOUNTER — EMERGENCY (EMERGENCY)
Facility: HOSPITAL | Age: 45
LOS: 0 days | Discharge: HOME | End: 2020-10-16
Attending: EMERGENCY MEDICINE
Payer: MEDICAID

## 2020-10-16 VITALS
DIASTOLIC BLOOD PRESSURE: 94 MMHG | TEMPERATURE: 98 F | OXYGEN SATURATION: 100 % | SYSTOLIC BLOOD PRESSURE: 126 MMHG | HEART RATE: 93 BPM | WEIGHT: 147.93 LBS | RESPIRATION RATE: 18 BRPM | HEIGHT: 64 IN

## 2020-10-16 VITALS
DIASTOLIC BLOOD PRESSURE: 92 MMHG | SYSTOLIC BLOOD PRESSURE: 155 MMHG | HEART RATE: 97 BPM | RESPIRATION RATE: 18 BRPM | OXYGEN SATURATION: 99 % | TEMPERATURE: 99 F

## 2020-10-16 DIAGNOSIS — Z98.890 OTHER SPECIFIED POSTPROCEDURAL STATES: Chronic | ICD-10-CM

## 2020-10-16 DIAGNOSIS — R11.10 VOMITING, UNSPECIFIED: ICD-10-CM

## 2020-10-16 DIAGNOSIS — K21.9 GASTRO-ESOPHAGEAL REFLUX DISEASE WITHOUT ESOPHAGITIS: ICD-10-CM

## 2020-10-16 DIAGNOSIS — K56.7 ILEUS, UNSPECIFIED: ICD-10-CM

## 2020-10-16 DIAGNOSIS — R11.2 NAUSEA WITH VOMITING, UNSPECIFIED: ICD-10-CM

## 2020-10-16 DIAGNOSIS — B34.1 ENTEROVIRUS INFECTION, UNSPECIFIED: ICD-10-CM

## 2020-10-16 DIAGNOSIS — I10 ESSENTIAL (PRIMARY) HYPERTENSION: ICD-10-CM

## 2020-10-16 DIAGNOSIS — F41.9 ANXIETY DISORDER, UNSPECIFIED: ICD-10-CM

## 2020-10-16 DIAGNOSIS — E78.5 HYPERLIPIDEMIA, UNSPECIFIED: ICD-10-CM

## 2020-10-16 DIAGNOSIS — K29.70 GASTRITIS, UNSPECIFIED, WITHOUT BLEEDING: ICD-10-CM

## 2020-10-16 DIAGNOSIS — R10.13 EPIGASTRIC PAIN: ICD-10-CM

## 2020-10-16 LAB
ADD ON TEST-SPECIMEN IN LAB: SIGNIFICANT CHANGE UP
ALBUMIN SERPL ELPH-MCNC: 3.9 G/DL — SIGNIFICANT CHANGE UP (ref 3.3–5)
ALP SERPL-CCNC: 83 U/L — SIGNIFICANT CHANGE UP (ref 40–120)
ALT FLD-CCNC: 34 U/L — SIGNIFICANT CHANGE UP (ref 12–78)
ANION GAP SERPL CALC-SCNC: 6 MMOL/L — SIGNIFICANT CHANGE UP (ref 5–17)
APPEARANCE UR: CLEAR — SIGNIFICANT CHANGE UP
AST SERPL-CCNC: 22 U/L — SIGNIFICANT CHANGE UP (ref 15–37)
BASOPHILS # BLD AUTO: 0.04 K/UL — SIGNIFICANT CHANGE UP (ref 0–0.2)
BASOPHILS NFR BLD AUTO: 0.4 % — SIGNIFICANT CHANGE UP (ref 0–2)
BILIRUB SERPL-MCNC: 0.7 MG/DL — SIGNIFICANT CHANGE UP (ref 0.2–1.2)
BILIRUB UR-MCNC: NEGATIVE — SIGNIFICANT CHANGE UP
BUN SERPL-MCNC: 15 MG/DL — SIGNIFICANT CHANGE UP (ref 7–23)
CALCIUM SERPL-MCNC: 9.4 MG/DL — SIGNIFICANT CHANGE UP (ref 8.5–10.1)
CHLORIDE SERPL-SCNC: 102 MMOL/L — SIGNIFICANT CHANGE UP (ref 96–108)
CO2 SERPL-SCNC: 28 MMOL/L — SIGNIFICANT CHANGE UP (ref 22–31)
COLOR SPEC: YELLOW — SIGNIFICANT CHANGE UP
CREAT SERPL-MCNC: 0.78 MG/DL — SIGNIFICANT CHANGE UP (ref 0.5–1.3)
DIFF PNL FLD: NEGATIVE — SIGNIFICANT CHANGE UP
EOSINOPHIL # BLD AUTO: 0.57 K/UL — HIGH (ref 0–0.5)
EOSINOPHIL NFR BLD AUTO: 6.4 % — HIGH (ref 0–6)
GLUCOSE SERPL-MCNC: 95 MG/DL — SIGNIFICANT CHANGE UP (ref 70–99)
GLUCOSE UR QL: NEGATIVE MG/DL — SIGNIFICANT CHANGE UP
HCT VFR BLD CALC: 47.8 % — SIGNIFICANT CHANGE UP (ref 39–50)
HGB BLD-MCNC: 16.3 G/DL — SIGNIFICANT CHANGE UP (ref 13–17)
IMM GRANULOCYTES NFR BLD AUTO: 0.2 % — SIGNIFICANT CHANGE UP (ref 0–1.5)
KETONES UR-MCNC: ABNORMAL
LEUKOCYTE ESTERASE UR-ACNC: NEGATIVE — SIGNIFICANT CHANGE UP
LIDOCAIN IGE QN: 109 U/L — SIGNIFICANT CHANGE UP (ref 73–393)
LYMPHOCYTES # BLD AUTO: 1.25 K/UL — SIGNIFICANT CHANGE UP (ref 1–3.3)
LYMPHOCYTES # BLD AUTO: 14 % — SIGNIFICANT CHANGE UP (ref 13–44)
MCHC RBC-ENTMCNC: 29.7 PG — SIGNIFICANT CHANGE UP (ref 27–34)
MCHC RBC-ENTMCNC: 34.1 GM/DL — SIGNIFICANT CHANGE UP (ref 32–36)
MCV RBC AUTO: 87.1 FL — SIGNIFICANT CHANGE UP (ref 80–100)
MONOCYTES # BLD AUTO: 0.58 K/UL — SIGNIFICANT CHANGE UP (ref 0–0.9)
MONOCYTES NFR BLD AUTO: 6.5 % — SIGNIFICANT CHANGE UP (ref 2–14)
NEUTROPHILS # BLD AUTO: 6.44 K/UL — SIGNIFICANT CHANGE UP (ref 1.8–7.4)
NEUTROPHILS NFR BLD AUTO: 72.5 % — SIGNIFICANT CHANGE UP (ref 43–77)
NITRITE UR-MCNC: NEGATIVE — SIGNIFICANT CHANGE UP
PH UR: 7 — SIGNIFICANT CHANGE UP (ref 5–8)
PLATELET # BLD AUTO: 252 K/UL — SIGNIFICANT CHANGE UP (ref 150–400)
POTASSIUM SERPL-MCNC: 3.7 MMOL/L — SIGNIFICANT CHANGE UP (ref 3.5–5.3)
POTASSIUM SERPL-SCNC: 3.7 MMOL/L — SIGNIFICANT CHANGE UP (ref 3.5–5.3)
PROT SERPL-MCNC: 7.4 GM/DL — SIGNIFICANT CHANGE UP (ref 6–8.3)
PROT UR-MCNC: NEGATIVE MG/DL — SIGNIFICANT CHANGE UP
RBC # BLD: 5.49 M/UL — SIGNIFICANT CHANGE UP (ref 4.2–5.8)
RBC # FLD: 12.5 % — SIGNIFICANT CHANGE UP (ref 10.3–14.5)
SARS-COV-2 RNA SPEC QL NAA+PROBE: SIGNIFICANT CHANGE UP
SODIUM SERPL-SCNC: 136 MMOL/L — SIGNIFICANT CHANGE UP (ref 135–145)
SP GR SPEC: 1.01 — SIGNIFICANT CHANGE UP (ref 1.01–1.02)
UROBILINOGEN FLD QL: NEGATIVE MG/DL — SIGNIFICANT CHANGE UP
WBC # BLD: 8.9 K/UL — SIGNIFICANT CHANGE UP (ref 3.8–10.5)
WBC # FLD AUTO: 8.9 K/UL — SIGNIFICANT CHANGE UP (ref 3.8–10.5)

## 2020-10-16 PROCEDURE — 74176 CT ABD & PELVIS W/O CONTRAST: CPT | Mod: 26,59

## 2020-10-16 PROCEDURE — U0003: CPT

## 2020-10-16 PROCEDURE — 80053 COMPREHEN METABOLIC PANEL: CPT

## 2020-10-16 PROCEDURE — 96374 THER/PROPH/DIAG INJ IV PUSH: CPT | Mod: XU

## 2020-10-16 PROCEDURE — 74177 CT ABD & PELVIS W/CONTRAST: CPT | Mod: 26

## 2020-10-16 PROCEDURE — 83690 ASSAY OF LIPASE: CPT

## 2020-10-16 PROCEDURE — 99284 EMERGENCY DEPT VISIT MOD MDM: CPT

## 2020-10-16 PROCEDURE — 76705 ECHO EXAM OF ABDOMEN: CPT

## 2020-10-16 PROCEDURE — 99284 EMERGENCY DEPT VISIT MOD MDM: CPT | Mod: 25

## 2020-10-16 PROCEDURE — 76705 ECHO EXAM OF ABDOMEN: CPT | Mod: 26

## 2020-10-16 PROCEDURE — 81003 URINALYSIS AUTO W/O SCOPE: CPT

## 2020-10-16 PROCEDURE — 74178 CT ABD&PLV WO CNTR FLWD CNTR: CPT

## 2020-10-16 PROCEDURE — 96361 HYDRATE IV INFUSION ADD-ON: CPT

## 2020-10-16 PROCEDURE — 0225U NFCT DS DNA&RNA 21 SARSCOV2: CPT

## 2020-10-16 PROCEDURE — 74177 CT ABD & PELVIS W/CONTRAST: CPT

## 2020-10-16 PROCEDURE — 96376 TX/PRO/DX INJ SAME DRUG ADON: CPT

## 2020-10-16 PROCEDURE — 74176 CT ABD & PELVIS W/O CONTRAST: CPT

## 2020-10-16 PROCEDURE — 36415 COLL VENOUS BLD VENIPUNCTURE: CPT

## 2020-10-16 PROCEDURE — 96375 TX/PRO/DX INJ NEW DRUG ADDON: CPT

## 2020-10-16 PROCEDURE — 85025 COMPLETE CBC W/AUTO DIFF WBC: CPT

## 2020-10-16 RX ORDER — METOCLOPRAMIDE HCL 10 MG
10 TABLET ORAL ONCE
Refills: 0 | Status: COMPLETED | OUTPATIENT
Start: 2020-10-16 | End: 2020-10-16

## 2020-10-16 RX ORDER — SODIUM CHLORIDE 9 MG/ML
1000 INJECTION INTRAMUSCULAR; INTRAVENOUS; SUBCUTANEOUS ONCE
Refills: 0 | Status: COMPLETED | OUTPATIENT
Start: 2020-10-16 | End: 2020-10-16

## 2020-10-16 RX ORDER — ONDANSETRON 8 MG/1
4 TABLET, FILM COATED ORAL ONCE
Refills: 0 | Status: COMPLETED | OUTPATIENT
Start: 2020-10-16 | End: 2020-10-16

## 2020-10-16 RX ORDER — ONDANSETRON 8 MG/1
1 TABLET, FILM COATED ORAL
Qty: 15 | Refills: 0
Start: 2020-10-16

## 2020-10-16 RX ORDER — PROCHLORPERAZINE MALEATE 5 MG
10 TABLET ORAL ONCE
Refills: 0 | Status: COMPLETED | OUTPATIENT
Start: 2020-10-16 | End: 2020-10-16

## 2020-10-16 RX ORDER — POTASSIUM CHLORIDE 20 MEQ
20 PACKET (EA) ORAL ONCE
Refills: 0 | Status: COMPLETED | OUTPATIENT
Start: 2020-10-16 | End: 2020-10-16

## 2020-10-16 RX ORDER — FAMOTIDINE 10 MG/ML
20 INJECTION INTRAVENOUS ONCE
Refills: 0 | Status: COMPLETED | OUTPATIENT
Start: 2020-10-16 | End: 2020-10-16

## 2020-10-16 RX ADMIN — ONDANSETRON 4 MILLIGRAM(S): 8 TABLET, FILM COATED ORAL at 01:26

## 2020-10-16 RX ADMIN — SODIUM CHLORIDE 1000 MILLILITER(S): 9 INJECTION INTRAMUSCULAR; INTRAVENOUS; SUBCUTANEOUS at 03:52

## 2020-10-16 RX ADMIN — SODIUM CHLORIDE 1000 MILLILITER(S): 9 INJECTION INTRAMUSCULAR; INTRAVENOUS; SUBCUTANEOUS at 05:42

## 2020-10-16 RX ADMIN — ONDANSETRON 4 MILLIGRAM(S): 8 TABLET, FILM COATED ORAL at 05:30

## 2020-10-16 RX ADMIN — Medication 10 MILLIGRAM(S): at 03:35

## 2020-10-16 RX ADMIN — Medication 20 MILLIEQUIVALENT(S): at 05:14

## 2020-10-16 RX ADMIN — SODIUM CHLORIDE 1000 MILLILITER(S): 9 INJECTION INTRAMUSCULAR; INTRAVENOUS; SUBCUTANEOUS at 03:35

## 2020-10-16 RX ADMIN — Medication 10 MILLIGRAM(S): at 06:06

## 2020-10-16 RX ADMIN — SODIUM CHLORIDE 1000 MILLILITER(S): 9 INJECTION INTRAMUSCULAR; INTRAVENOUS; SUBCUTANEOUS at 01:26

## 2020-10-16 RX ADMIN — FAMOTIDINE 20 MILLIGRAM(S): 10 INJECTION INTRAVENOUS at 01:25

## 2020-10-16 NOTE — CONSULT NOTE ADULT - ASSESSMENT
Plan:  Pending Plan:  Repeat CT reviewed   Dilated bowel improved   No surgical intervention planned for this patient  Symptoms appears to be of an infectious etiology  Clear for DC    Discuss case with Dr Lange

## 2020-10-16 NOTE — ED PROVIDER NOTE - PATIENT PORTAL LINK FT
You can access the FollowMyHealth Patient Portal offered by [facility] by registering at the following website: [email or web address]. By joining [employer]’s FollowMyHealth portal, you will also be able to view your health information using other applications (apps) compatible with our system.

## 2020-10-16 NOTE — ED ADULT NURSE NOTE - NSIMPLEMENTINTERV_GEN_ALL_ED
Implemented All Universal Safety Interventions:  [place] to call system. Call bell, personal items and telephone within reach. Instruct patient to call for assistance. Room bathroom lighting operational. Non-slip footwear when patient is off stretcher. Physically safe environment: no spills, clutter or unnecessary equipment. Stretcher in lowest position, wheels locked, appropriate side rails in place.

## 2020-10-16 NOTE — ED ADULT NURSE NOTE - OBJECTIVE STATEMENT
pt. presents to ER with epigastric abdominal pain, nausea, vomiting X 3 days. pt. states pain got worse after food ingestion. Patient seen in  [facility] recently for the similar episode however pain not relieved with omeprazole. Patient denies fever, chest pain, shortness of breath or diarrhea. no distress noted.

## 2020-10-16 NOTE — ED PROVIDER NOTE - NSFOLLOWUPINSTRUCTIONS_ED_ALL_ED_FT
Please call and follow up with your doctor in 1-3 days.  Please call [phone] to find doctors in [facility].        Gastroenteritis    LO QUE NECESITA SABER:    ¿Qué es la gastroenteritis?La gastroenteritis, o gripe estomacal, es sena infección del estómago y los intestinos. Es provocada por sena bacteria, parásitos o virus.    Tracto digestivo         ¿Qué aumenta mi riesgo de gastroenteritis?  •Contacto cercano con sean persona o animal infectado      •Intoxicación alimentaria, blane de huevos, verduras crudas, mariscos o carne que no está cocinada completamente      •Cony agua contaminada, blane al acampar o salir de viaje      ¿Cuáles son los signos y síntomas de la gastroenteritis?  •Diarrea o gas      •Náusea, vómitos o apetito deficiente      •Calambres, dolor o gorgoteo abdominales      •Fiebre      •Cansancio o debilidad      •Velia de russell o musculares con cualquiera de los síntomas anteriores      ¿Cómo se diagnostica y trata la gastroenteritis?Zhang médico lo examinará. Él buscará signos de deshidratación. Le preguntará con qué frecuencia usted ha estado vomitando o ha tenido diarrea. Usted podría necesitar que le tomen sena muestra de malik o de evacuaciones intestinales para analizarla y determinar si contiene el germen causante de zhang gastroenteritis. La gastroenteritis con frecuencia se adán por sí juan pablo. Es posible que le den medicamento para reducir o detener la diarrea y los vómitos. Es posible que también necesite medicamentos para tratar sena infección a causa de sena bacteria o parásitos.    ¿Cómo puedo controlar mi gastroenteritis?  •Rena Lara líquidos blane se le haya indicado.Pregunte a zhang médico qué cantidad de líquido debe beber a diario y qué líquidos le recomienda. Es posible que también necesite cony sena solución de rehidratación oral (SRO). Sena SRO contiene la cantidad adecuada de azúcar, sal y minerales en agua para reponer los líquidos corporales.      •Consuma alimentos blandos.Cuando usted sienta hambre, empiece a comer alimentos suaves y blandos. Ejemplos son los plátanos, sopas claras, azul y puré de manzana. No consuma productos lácteos, alcohol, bebidas azucaradas, o bebidas con cafeína hasta que se sienta mejor.      •Descanse el mayor tiempo posible.Cuando se empiece a sentir mejor, comience poco a poco a hacer más cada día.      ¿Cómo puedo evitar la gastroenteritis?La gastroenteritis se puede propagar fácilmente. Manténgase usted, zhang carlos y stefanie alrededores limpios para ayudar a evitar la propagación de la gastroenteritis:   •Lávese las julia frecuentemente.Utilice agua y jabón. Lávese las julia después de usar el baño, cambiarle el pañal a un taina o estornudar. Lávese las julia antes de comer o preparar alimentos.   Lavado de julia           •Limpie las superficies y lave la ropa con frecuencia.Lave zhang ropa y stefanie toallas por separado del larry de la ropa. Limpie las superficies de zhang hogar con limpiador antibacterial o con blanqueador.      •Lave y cocine carolynn los alimentos.Lave las verduras crudas antes de cocinar. Cocine carolynn las salome, pescados y huevos. No utilice los mismos platos para las salome crudas que para otros alimentos. Ponga en el refrigerador inmediatamente cualquier alimento que haya sobrado.      •Esté alerta cuando usted vaya de campamento o cuando viaje.Solamente tome agua limpia. No tome agua de los markel o henrik a menos que usted purifique o hierva el agua willi. Cuando viaje, tome agua embotellada y no le ponga hielo. No coma fruta con la cáscara. No coma pescado crudo o salome que no están cocinadas completamente.      Llame al 911 en geovany de presentar lo siguiente:  •Usted tiene dificultad para respirar o pulso acelerado.          ¿Cuándo luna buscar atención inmediata?  •Usted ve malik en zhang diarrea.      •Usted no puede dejar de vomitar.      •Usted no ha orinado en 12 horas.      •Usted siente que se va a desmayar.      ¿Cuándo luna comunicarme con mi médico?  •Tiene fiebre.      •Usted continúa con vómitos o diarrea aún después del tratamiento.      •Usted ve lombrices en zhang diarrea.      •Zhang boca u ojos están secos. Usted no está orinando tanto o con la misma frecuencia.      •Usted tiene preguntas o inquietudes acerca de zhang condición o cuidado.      ACUERDOS SOBRE ZHANG CUIDADO:    Usted tiene el derecho de ayudar a planear zhang cuidado. Aprenda todo lo que pueda sobre zhang condición y blane darle tratamiento. Discuta stefanie opciones de tratamiento con stefanie médicos para decidir el cuidado que usted desea recibir. Usted siempre tiene el derecho de rechazar el tratamiento.

## 2020-10-16 NOTE — ED PROVIDER NOTE - OBJECTIVE STATEMENT
Pt. is a 43 yo M with a hx of HTN, GERD, anxiety presents with epigastric abdominal pain, nausea, bilious vomiting X 3 days.  Patient states he was in the ED recently for the same and has been taking omeprazole with no relief.  Patient denies fever, chest pain, shortness of breath.  +low back pain.  Denies dysuria or hematuria but urine looks cloudy. Patient states after eating last night, the pain worsened and he ate soft bland foods.  Patient states even drinking water now causing pain.  Pain is described as a "sour stomach" with epigastric burning after vomiting.  Patient denies constipation or diarrhea.

## 2020-10-16 NOTE — ED PROVIDER NOTE - PROGRESS NOTE DETAILS
Dr. Lange on call for general surgery paged for consult. Discussed case and results with Dr. Lange.  Repeat CT with oral contrast requested and ordered.  Patient just vomitted again in the trash can in his ED room after potassium given.  Not able to tolerate PO.  This was discussed with surgical resident on call who is coming to see the patient and recommended trying compazine for nausea. Attending Jackman, pt updated on results.  Feeling better.  repeat ct scan shows resolving ileus.  Surgery updated and ok with d/c for pt.

## 2020-10-16 NOTE — ED PROVIDER NOTE - CLINICAL SUMMARY MEDICAL DECISION MAKING FREE TEXT BOX
3 days of abdominal pain and vomiting.  Pressure in epigastric area.  Abd US without acute surgical abnormality.  Patient actively vomiting in ED.  CT showing ileus vs partial obstruction.  Will consult general surgery.

## 2020-10-16 NOTE — ED PROVIDER NOTE - SECONDARY DIAGNOSIS.
Vomiting, intractability of vomiting not specified, presence of nausea not specified, unspecified vomiting type Ileus

## 2020-10-16 NOTE — ED PROVIDER NOTE - CARE PLAN
Principal Discharge DX:	Partial intestinal obstruction, unspecified cause   Principal Discharge DX:	Enterovirus infection  Secondary Diagnosis:	Vomiting, intractability of vomiting not specified, presence of nausea not specified, unspecified vomiting type  Secondary Diagnosis:	Ileus

## 2020-10-16 NOTE — CONSULT NOTE ADULT - SUBJECTIVE AND OBJECTIVE BOX
Pt is a 45 YO M that is presenting to the ED for the 2nd time in 2 days for a complaint of vomiting and mid abdominal pain for the last few days. Pt denied fever, chills, diarrhea, sick contacts.    Vital Signs Last 24 Hrs  T(C): 37 (16 Oct 2020 05:16), Max: 37 (16 Oct 2020 05:16)  T(F): 98.6 (16 Oct 2020 05:16), Max: 98.6 (16 Oct 2020 05:16)  HR: 97 (16 Oct 2020 05:16) (93 - 97)  BP: 155/92 (16 Oct 2020 05:16) (126/94 - 155/92)  BP(mean): --  RR: 18 (16 Oct 2020 05:16) (18 - 18)  SpO2: 99% (16 Oct 2020 05:16) (99% - 100%)    PE:  General: NAD,   Neck: Supple  Chest: Equal expansion bilaterally, equal breath sounds  CV: S1S2 Present  Abdomen: Soft, non distended, tender to palpation in the umbilicus radiating to the epigastric area, non-tympanic, bowel sounds   Extremities: Grossly symmetric     Labs:              16.3                   136  | 28   | 15           8.90  >-----------< 252     ------------------------< 95                    47.8                    3.7  | 102  | 0.78                                                                       Ca 9.4   Mg x     Ph x        Imaging:  CT Abdomen and Pelvis w/ Oral Cont (10.16.20 @ 07:56) >  FINDINGS:  LOWER CHEST: Lung bases are clear.    LIVER: 1.2 cm low-attenuation lesion in the right hepatic lobe, previously 1.0 cm on 4/3/2017, possibly a hemangioma.  BILE DUCTS: Normal caliber.  GALLBLADDER: Within normal limits.  SPLEEN: Within normal limits.  PANCREAS: Within normal limits.  ADRENALS: Within normal limits.  KIDNEYS/URETERS: Excreted intravenous contrast in the renal collecting systems. No hydronephrosis.    BLADDER: Underdistended, limiting evaluation. Excreted intravenous contrast.  REPRODUCTIVE ORGANS: Prostate not enlarged.    BOWEL: Mildly dilated loops of small bowel which have overall decreased in caliber since 4:06 AM. Oral contrast reaches the colon. Colonic diverticulosis without evidence of diverticulitis. Moderate amountretained fecal material in the colon. Appendix is normal.  PERITONEUM: No ascites.  VESSELS: Abdominal aorta normal in caliber.  RETROPERITONEUM/LYMPH NODES: No lymphadenopathy.  ABDOMINAL WALL: Tiny fat-containing umbilical hernia.  BONES: Right L5 spondylolysis with mild anterolisthesis of L5 on S1.    IMPRESSION:  Mildly dilated loops of small bowel which have overall decreased in caliber since 4:06 AM and oral contrast reaching the colon, likely a resolving/resolved ileus or partial obstruction.

## 2020-10-16 NOTE — ED ADULT TRIAGE NOTE - CHIEF COMPLAINT QUOTE
abd pain and vomiting started today. Denies fever/chills, diarrhea. pt was seen in ED 2 days for similar sx.

## 2020-10-16 NOTE — ED ADULT NURSE REASSESSMENT NOTE - NS ED NURSE REASSESS COMMENT FT1
pt. noted vomiting after oral contrast and PO KCL. MD Smith aware, IV medications given as ordered. no other complains or distress noted.

## 2020-11-01 ENCOUNTER — EMERGENCY (EMERGENCY)
Facility: HOSPITAL | Age: 45
LOS: 0 days | Discharge: HOME | End: 2020-11-01
Attending: EMERGENCY MEDICINE
Payer: MEDICAID

## 2020-11-01 VITALS
RESPIRATION RATE: 18 BRPM | HEART RATE: 69 BPM | OXYGEN SATURATION: 97 % | SYSTOLIC BLOOD PRESSURE: 147 MMHG | TEMPERATURE: 99 F | DIASTOLIC BLOOD PRESSURE: 96 MMHG

## 2020-11-01 VITALS — WEIGHT: 147.93 LBS | HEIGHT: 64 IN

## 2020-11-01 DIAGNOSIS — I10 ESSENTIAL (PRIMARY) HYPERTENSION: ICD-10-CM

## 2020-11-01 DIAGNOSIS — Z98.890 OTHER SPECIFIED POSTPROCEDURAL STATES: Chronic | ICD-10-CM

## 2020-11-01 DIAGNOSIS — E78.5 HYPERLIPIDEMIA, UNSPECIFIED: ICD-10-CM

## 2020-11-01 DIAGNOSIS — K29.70 GASTRITIS, UNSPECIFIED, WITHOUT BLEEDING: ICD-10-CM

## 2020-11-01 DIAGNOSIS — R11.0 NAUSEA: ICD-10-CM

## 2020-11-01 PROCEDURE — 99283 EMERGENCY DEPT VISIT LOW MDM: CPT

## 2020-11-01 RX ORDER — ONDANSETRON 8 MG/1
1 TABLET, FILM COATED ORAL
Qty: 20 | Refills: 0
Start: 2020-11-01

## 2020-11-01 NOTE — ED STATDOCS - NSFOLLOWUPINSTRUCTIONS_ED_ALL_ED_FT
Gastritis - Adultos    Gastritis, Adult    La gastritis es la inflamación del estómago. Hay dos tipos de gastritis:  •Gastritis aguda. Concha tipo aparece de manera repentina.      •Gastritis crónica. Concha tipo es mucho más frecuente y dura mucho tiempo.      La gastritis se manifiesta cuando el revestimiento del estómago se debilita o se lesiona. Sin tratamiento, la gastritis puede causar sangrado y úlceras estomacales.      ¿Cuáles son las causas?  Esta afección puede ser causada por lo siguiente:  •Infección.      •Beber alcohol en exceso.      •Ciertos medicamentos. Estos incluyen corticoesteroides, antibióticos y algunos medicamentos de venta sin receta, blane aspirina o ibuprofeno.      •Hay demasiado ácido en el estómago.      •Sena enfermedad del intestino o del estómago.      •Estrés.      •Sena reacción alérgica.      •Enfermedad de Crohn.      •Algunos tratamientos para el cáncer (radiación).      A veces, se desconoce la causa de esta afección.      ¿Cuáles son los signos o los síntomas?  Los síntomas de esta afección incluyen los siguientes:  •Dolor o sensación de ardor en la parte superior del abdomen.      •Náuseas.      •Vómitos.      •Sensación molesta de saciedad después de comer.      •Pérdida de peso.      •Mal aliento.      •Malik en el vómito o las heces.      En algunos casos, no hay síntomas.      ¿Cómo se diagnostica?  Esta afección puede diagnosticarse en función de lo siguiente:  •Valeria antecedentes médicos y sena descripción de valeria síntomas.      •Un examen físico.    •Estudios. Estos pueden incluir los siguientes:  •Análisis de malik.      •Pruebas de heces.      •Un estudio en el que se introduce un instrumento sanchez y flexible con sena raphael y sena pequeña cámara a través del esófago hasta el estómago (endoscopía gary).      •Un estudio en el cual se jian sena muestra de tejido para analizarlo (biopsia).          ¿Cómo se trata?  Esta afección se puede tratar con medicamentos. Los medicamentos que se utilizan varían según la causa de la gastritis:  •Si la afección se debe a sena infección bacteriana, pueden recetarle medicamentos antibióticos.      •Si la afección se debe al exceso de ácido estomacal, se pueden administrar medicamentos denominados bloqueadores H2, inhibidores de la bomba de protones o antiácidos.      El tratamiento puede también incluir interrumpir el uso de ciertos medicamentos blane aspirina, ibuprofeno u otros antiinflamatorios no esteroideos (KADEN).      Siga estas indicaciones en zhang casa:    Medicamentos     •Winn los medicamentos de venta nel y los recetados solamente blane se lo haya indicado el médico.      •Si le recetaron un antibiótico, tómelo blane se lo haya indicado el médico. No deje de cony el antibiótico, aunque comience a sentirse mejor.        Comida y bebida      •Froilan comidas pequeñas y frecuentes shashank el día en lugar de comidas abundantes.      •Evite los alimentos y las bebidas que intensifican los síntomas.      •Lana suficiente líquido blane para mantener la orina de color amarillo pálido.      Consumo de alcohol   • No lana alcohol si:  •Zhang médico le indica no hacerlo.      •Está embarazada, puede estar embarazada o está tratando de quedar embarazada.      •Si david alcohol:•Limite zhang uso a las siguientes medidas:  •De 0 a 1 medida por día para las mujeres.      •De 0 a 2 medidas por día para los hombres.        •Esté atento a la cantidad de alcohol que hay en las bebidas que jian. En los [place], sena medida equivale a sena botella de cerveza de 12 oz (355 ml), un vaso de vino de 5 oz (148 ml) o un vaso de sena bebida alcohólica de gary graduación de 1½ oz (44 ml).        Indicaciones generales     •Hable con el médico sobre las formas de controlar el estrés, blane realizar ejercicio con regularidad o practicar respiración profunda, meditación o yoga.      • No consuma ningún producto que contenga nicotina o tabaco, blane cigarrillos y cigarrillos electrónicos. Si necesita ayuda para dejar de fumar, consulte al médico.      •Concurra a todas las visitas de seguimiento blane se lo haya indicado el médico. [place] es importante.        Comuníquese con un médico si:    •Valeria síntomas empeoran.      •Los síntomas regresan después del tratamiento.        Solicite ayuda inmediatamente si:    •Vomita malik de color romero brillante o sena sustancia similar a los granos de café.      •Las heces son negras o de color romero oscuro.      •No puede retener los líquidos.      •El dolor abdominal empeora.      •Tiene fiebre.      •No se siente mejor luego de sena semana.        Resumen    •La gastritis es la inflamación del revestimiento del estómago que puede ocurrir de manera repentina (aguda) o desarrollarse lentamente con el tiempo (crónica).      •Esta afección se diagnostica mediante los antecedentes médicos, un examen físico o estudios.      •Esta afección puede tratarse con medicamentos para tratar la infección o medicamentos para reducir la cantidad de ácido en el estómago.      •Siga las indicaciones del médico acerca de cony medicamentos, hacer cambios en la dieta y saber cuándo pedir ayuda.      Esta información no tiene blane fin reemplazar el consejo del médico. Asegúrese de hacerle al médico cualquier pregunta que tenga.

## 2020-11-01 NOTE — ED STATDOCS - CHPI ED TIMING
Called patient, information was verbally conveyed and understood. No questions at this time.        none

## 2020-11-01 NOTE — ED STATDOCS - PMH
Gastritis without bleeding, unspecified chronicity, unspecified gastritis type    HTN (hypertension)    Hyperlipidemia, unspecified hyperlipidemia type     Gastritis without bleeding, unspecified chronicity, unspecified gastritis type    HTN (hypertension)    Hyperlipidemia, unspecified hyperlipidemia type    Partial small bowel obstruction

## 2020-11-01 NOTE — ED STATDOCS - PHYSICAL EXAMINATION
PA NOTE: GEN: AOX3, NAD. HEENT: Throat clear. Airway is patent. EYES: PERRLA. EOMI. Head: NC/AT. NECK: Supple, No JVD. FROM. C-spine non-tender. CV:S1S2, RRR, LUNGS: Non-labored breathing, no tachypnea. O2sat 100% RA. CTA b/l. No w/r/r. CHEST: Equal chest expansion and rise. No deformity. ABD: Soft, NT/ND, no rebound, no guarding. NEG Salguero. NEG McBurney's point tenderness. No CVAT. EXT: No e/c/c. 2+ distal pulses. SKIN: No rashes. NEURO: No focal deficits. CN II-XII intact. FROM. 5/5 motor and sensory. ~Juan Christian PA-C

## 2020-11-01 NOTE — ED STATDOCS - CLINICAL SUMMARY MEDICAL DECISION MAKING FREE TEXT BOX
pt presents requesting Zofran refill. Pt has gastritis seeing [facility]. will refill Zofran and follow up with [facility]. pt presents requesting Zofran refill. Pt has gastritis seeing [facility]. will refill Zofran and follow up with [facility].    PA note: Zofran ODT called into [facility] @ [facility]. Patient re-examined and re-evaluated. Patient feels much better at this time. ED evaluation, Diagnosis and management discussed with the patient in detail, and with ED attending CORY Quarles to [place] home. Close GI follow up encouraged.  Strict ED return instructions discussed in detail and patient given the opportunity to ask any questions about their discharge diagnosis and instructions. Patient verbalized understanding. ~ Juan Christian PA-C

## 2020-11-01 NOTE — ED STATDOCS - PATIENT PORTAL LINK FT
You can access the FollowMyHealth Patient Portal offered by [facility] by registering at the following website: [email or web address]. By joining [employer]’s FollowMyHealth portal, you will also be able to view your health information using other applications (apps) compatible with our system.

## 2020-11-01 NOTE — ED STATDOCS - ATTENDING CONTRIBUTION TO CARE
I, Kathie Kang MD, performed the initial face to face bedside interview with this patient regarding history of present illness, review of symptoms and relevant past medical, social and family history.  I completed an independent physical examination.  I was the initial provider who evaluated this patient. I have signed out the follow up of any pending tests (i.e. labs, radiological studies) to the ACP.  I have communicated the patient’s plan of care and disposition with the ACP.  The history, relevant review of systems, past medical and surgical history, medical decision making, and physical examination was documented by the scribe in my presence and I attest to the accuracy of the documentation.

## 2020-11-01 NOTE — ED STATDOCS - PROGRESS NOTE DETAILS
PA: Patient is a 46 y/o male with PMHx of partial SBO 2 weeks ago, Hx of gastritis, HLD, and HTN who presents to [facility] c/o nausea. Pt is requesting a prescription refill of Zofran. Pt is seen at [facility] for gastritis, and has been feeling a lot better overall. DENIES abd pain, vomiting or diarrhea. No fever. ~Juan Christian PA-C PA note: Zofran ODT called into [facility]. Patient re-examined and re-evaluated. Patient feels much better at this time. ED evaluation, Diagnosis and management discussed with the patient in detail, and with ED attending CORY Quarles to [place] home. Close GI follow up encouraged.  Strict ED return instructions discussed in detail and patient given the opportunity to ask any questions about their discharge diagnosis and instructions. Patient verbalized understanding. ~ Juan Christian PA-C

## 2020-11-01 NOTE — ED STATDOCS - CARE PROVIDER_API CALL
Robert Velazquez  GASTROENTEROLOGY  [address] [place] [address]  Phone: [phone]  Fax: [phone]  Follow Up Time: Routine

## 2020-11-01 NOTE — ED STATDOCS - OBJECTIVE STATEMENT
46 y/o male with pmhx of gastritis, HLD, HTN presents to the ED c/o nausea. Pt is requesting a prescription refill of Zofran. 44 y/o male with pmhx of gastritis, HLD, HTN presents to the ED c/o nausea. Pt is requesting a prescription refill of Zofran. Pt is seen at [facility] for gastritis.

## 2021-03-03 ENCOUNTER — OUTPATIENT (OUTPATIENT)
Dept: OUTPATIENT SERVICES | Facility: HOSPITAL | Age: 46
LOS: 1 days | End: 2021-03-03
Payer: MEDICARE

## 2021-03-03 DIAGNOSIS — Z20.828 CONTACT WITH AND (SUSPECTED) EXPOSURE TO OTHER VIRAL COMMUNICABLE DISEASES: ICD-10-CM

## 2021-03-03 DIAGNOSIS — Z98.890 OTHER SPECIFIED POSTPROCEDURAL STATES: Chronic | ICD-10-CM

## 2021-03-03 PROBLEM — K56.600 PARTIAL INTESTINAL OBSTRUCTION, UNSPECIFIED AS TO CAUSE: Chronic | Status: ACTIVE | Noted: 2020-11-01

## 2021-03-03 LAB — SARS-COV-2 RNA SPEC QL NAA+PROBE: SIGNIFICANT CHANGE UP

## 2021-03-03 PROCEDURE — C9803: CPT

## 2021-03-03 PROCEDURE — U0003: CPT

## 2021-03-03 PROCEDURE — U0005: CPT

## 2021-03-04 DIAGNOSIS — Z20.828 CONTACT WITH AND (SUSPECTED) EXPOSURE TO OTHER VIRAL COMMUNICABLE DISEASES: ICD-10-CM

## 2021-06-28 NOTE — ED PROVIDER NOTE - GASTROINTESTINAL, MLM
Abdomen soft, non-tender, no guarding.
report received from Junaid RN, pt currently resting in bed with neurosurgery at bedside

## 2021-10-06 PROBLEM — I10 ESSENTIAL HYPERTENSION: Status: ACTIVE | Noted: 2018-03-20

## 2021-10-18 NOTE — ASU PREOP CHECKLIST - NS PREOP CHK MONITOR ANESTHESIA CONSENT
done Purse String (Intermediate) Text: Given the location of the defect and the characteristics of the surrounding skin a purse string intermediate closure was deemed most appropriate.  Undermining was performed circumfirentially around the surgical defect.  A purse string suture was then placed and tightened.

## 2022-03-25 NOTE — ED ADULT NURSE NOTE - PMH
[facility]                                                                       Query Response Note      PATIENT:               SRIKANTH PAYNE  ACCT #:                  [id number]  MRN:                     [id number]  :                      1936  ADMIT DATE:       3/24/2022 9:39 AM  DISCH DATE:          RESPONDING  PROVIDER #:        [id number]           QUERY TEXT:    Anemia is documented in the Medical Record. Please specify the underlying cause of the anemia.    NOTE:  If the appropriate response is not listed below, please respond with a new note.              The patient's Clinical Indicators include:  SCHULTE 2/2 symptomatic anemia requiring transfusion. since 3/2022 patient drop hgb 10 to 6 s/p 1 unit PRBC- Dr. Naidu, Internal Medicine note, 3/24/22  Acute GI bleeding requiring transfusion, bilateral groin hematomas  - Dr. Fung, Interventional Cardiology note, 3/24/22  Noncontrast CT evidence of abdominopelvic bleeding. - CT Abdomen-pelvis, 3/24/22  H/H: 10.1/32.5--> 6.7/21.5  Risk factor: Acute GI bleeding, groin hematomas.    Treatment: s/p 1 unit PRBC    Thank you!  Viridiana Giordano NP  Clinical   Connect [employer]  Options provided:   -- Blood loss anemia, acute   -- Blood loss anemia, chronic   -- Chronic anemia   -- Postoperative blood loss anemia   -- Pernicious anemia   -- Unable to determine      Query created by: Viridiana Giordano on 3/25/2022 7:56 AM    RESPONSE TEXT:    Unable to determine          Electronically signed by:  FCO EWING MD 3/25/2022 8:07 AM               Gastritis without bleeding, unspecified chronicity, unspecified gastritis type    HTN (hypertension)    Hyperlipidemia, unspecified hyperlipidemia type

## 2022-06-13 ENCOUNTER — APPOINTMENT (OUTPATIENT)
Dept: UROLOGY | Facility: HOSPITAL | Age: 47
End: 2022-06-13

## 2022-10-06 ENCOUNTER — EMERGENCY (EMERGENCY)
Facility: HOSPITAL | Age: 47
LOS: 0 days | Discharge: HOME | End: 2022-10-07
Attending: EMERGENCY MEDICINE
Payer: MEDICAID

## 2022-10-06 VITALS — HEIGHT: 64 IN | WEIGHT: 145.06 LBS

## 2022-10-06 DIAGNOSIS — Z98.890 OTHER SPECIFIED POSTPROCEDURAL STATES: Chronic | ICD-10-CM

## 2022-10-06 DIAGNOSIS — E78.5 HYPERLIPIDEMIA, UNSPECIFIED: ICD-10-CM

## 2022-10-06 DIAGNOSIS — Z20.822 CONTACT WITH AND (SUSPECTED) EXPOSURE TO COVID-19: ICD-10-CM

## 2022-10-06 DIAGNOSIS — R09.81 NASAL CONGESTION: ICD-10-CM

## 2022-10-06 DIAGNOSIS — Z87.19 PERSONAL HISTORY OF OTHER DISEASES OF THE DIGESTIVE SYSTEM: ICD-10-CM

## 2022-10-06 DIAGNOSIS — R05.9 COUGH, UNSPECIFIED: ICD-10-CM

## 2022-10-06 DIAGNOSIS — R10.13 EPIGASTRIC PAIN: ICD-10-CM

## 2022-10-06 DIAGNOSIS — Z98.890 OTHER SPECIFIED POSTPROCEDURAL STATES: ICD-10-CM

## 2022-10-06 DIAGNOSIS — R11.2 NAUSEA WITH VOMITING, UNSPECIFIED: ICD-10-CM

## 2022-10-06 DIAGNOSIS — I10 ESSENTIAL (PRIMARY) HYPERTENSION: ICD-10-CM

## 2022-10-06 DIAGNOSIS — R00.2 PALPITATIONS: ICD-10-CM

## 2022-10-06 PROCEDURE — 36415 COLL VENOUS BLD VENIPUNCTURE: CPT

## 2022-10-06 PROCEDURE — 80053 COMPREHEN METABOLIC PANEL: CPT

## 2022-10-06 PROCEDURE — 93005 ELECTROCARDIOGRAM TRACING: CPT

## 2022-10-06 PROCEDURE — 85025 COMPLETE CBC W/AUTO DIFF WBC: CPT

## 2022-10-06 PROCEDURE — 0241U: CPT

## 2022-10-06 PROCEDURE — 96375 TX/PRO/DX INJ NEW DRUG ADDON: CPT

## 2022-10-06 PROCEDURE — 99285 EMERGENCY DEPT VISIT HI MDM: CPT | Mod: 25

## 2022-10-06 PROCEDURE — 71045 X-RAY EXAM CHEST 1 VIEW: CPT

## 2022-10-06 PROCEDURE — 96374 THER/PROPH/DIAG INJ IV PUSH: CPT

## 2022-10-06 PROCEDURE — 84484 ASSAY OF TROPONIN QUANT: CPT

## 2022-10-06 PROCEDURE — 83690 ASSAY OF LIPASE: CPT

## 2022-10-06 PROCEDURE — 99285 EMERGENCY DEPT VISIT HI MDM: CPT

## 2022-10-06 NOTE — ED ADULT TRIAGE NOTE - CHIEF COMPLAINT QUOTE
Complaining of abdominal pain, nausea and vomiting twice x1 day. Self ambulated into ED with no distress noted. Denies any CP/SOB.

## 2022-10-07 VITALS
TEMPERATURE: 99 F | SYSTOLIC BLOOD PRESSURE: 126 MMHG | OXYGEN SATURATION: 97 % | DIASTOLIC BLOOD PRESSURE: 72 MMHG | RESPIRATION RATE: 16 BRPM | HEART RATE: 60 BPM

## 2022-10-07 LAB
ALBUMIN SERPL ELPH-MCNC: 3.4 G/DL — SIGNIFICANT CHANGE UP (ref 3.3–5)
ALP SERPL-CCNC: 88 U/L — SIGNIFICANT CHANGE UP (ref 40–120)
ALT FLD-CCNC: 48 U/L — SIGNIFICANT CHANGE UP (ref 12–78)
ANION GAP SERPL CALC-SCNC: 5 MMOL/L — SIGNIFICANT CHANGE UP (ref 5–17)
AST SERPL-CCNC: 33 U/L — SIGNIFICANT CHANGE UP (ref 15–37)
BASOPHILS # BLD AUTO: 0.04 K/UL — SIGNIFICANT CHANGE UP (ref 0–0.2)
BASOPHILS NFR BLD AUTO: 0.4 % — SIGNIFICANT CHANGE UP (ref 0–2)
BILIRUB SERPL-MCNC: 0.2 MG/DL — SIGNIFICANT CHANGE UP (ref 0.2–1.2)
BUN SERPL-MCNC: 17 MG/DL — SIGNIFICANT CHANGE UP (ref 7–23)
CALCIUM SERPL-MCNC: 9 MG/DL — SIGNIFICANT CHANGE UP (ref 8.5–10.1)
CHLORIDE SERPL-SCNC: 105 MMOL/L — SIGNIFICANT CHANGE UP (ref 96–108)
CO2 SERPL-SCNC: 30 MMOL/L — SIGNIFICANT CHANGE UP (ref 22–31)
CREAT SERPL-MCNC: 1.03 MG/DL — SIGNIFICANT CHANGE UP (ref 0.5–1.3)
EGFR: 91 ML/MIN/1.73M2 — SIGNIFICANT CHANGE UP
EOSINOPHIL # BLD AUTO: 0.36 K/UL — SIGNIFICANT CHANGE UP (ref 0–0.5)
EOSINOPHIL NFR BLD AUTO: 3.9 % — SIGNIFICANT CHANGE UP (ref 0–6)
FLUAV AG NPH QL: SIGNIFICANT CHANGE UP
FLUBV AG NPH QL: SIGNIFICANT CHANGE UP
GLUCOSE SERPL-MCNC: 121 MG/DL — HIGH (ref 70–99)
HCT VFR BLD CALC: 42.8 % — SIGNIFICANT CHANGE UP (ref 39–50)
HGB BLD-MCNC: 15.3 G/DL — SIGNIFICANT CHANGE UP (ref 13–17)
IMM GRANULOCYTES NFR BLD AUTO: 0.4 % — SIGNIFICANT CHANGE UP (ref 0–0.9)
LIDOCAIN IGE QN: 150 U/L — SIGNIFICANT CHANGE UP (ref 73–393)
LYMPHOCYTES # BLD AUTO: 1.38 K/UL — SIGNIFICANT CHANGE UP (ref 1–3.3)
LYMPHOCYTES # BLD AUTO: 15.1 % — SIGNIFICANT CHANGE UP (ref 13–44)
MCHC RBC-ENTMCNC: 30.6 PG — SIGNIFICANT CHANGE UP (ref 27–34)
MCHC RBC-ENTMCNC: 35.7 GM/DL — SIGNIFICANT CHANGE UP (ref 32–36)
MCV RBC AUTO: 85.6 FL — SIGNIFICANT CHANGE UP (ref 80–100)
MONOCYTES # BLD AUTO: 0.63 K/UL — SIGNIFICANT CHANGE UP (ref 0–0.9)
MONOCYTES NFR BLD AUTO: 6.9 % — SIGNIFICANT CHANGE UP (ref 2–14)
NEUTROPHILS # BLD AUTO: 6.69 K/UL — SIGNIFICANT CHANGE UP (ref 1.8–7.4)
NEUTROPHILS NFR BLD AUTO: 73.3 % — SIGNIFICANT CHANGE UP (ref 43–77)
PLATELET # BLD AUTO: 266 K/UL — SIGNIFICANT CHANGE UP (ref 150–400)
POTASSIUM SERPL-MCNC: 3.8 MMOL/L — SIGNIFICANT CHANGE UP (ref 3.5–5.3)
POTASSIUM SERPL-SCNC: 3.8 MMOL/L — SIGNIFICANT CHANGE UP (ref 3.5–5.3)
PROT SERPL-MCNC: 7.1 GM/DL — SIGNIFICANT CHANGE UP (ref 6–8.3)
RBC # BLD: 5 M/UL — SIGNIFICANT CHANGE UP (ref 4.2–5.8)
RBC # FLD: 12 % — SIGNIFICANT CHANGE UP (ref 10.3–14.5)
RSV RNA NPH QL NAA+NON-PROBE: SIGNIFICANT CHANGE UP
SARS-COV-2 RNA SPEC QL NAA+PROBE: SIGNIFICANT CHANGE UP
SODIUM SERPL-SCNC: 140 MMOL/L — SIGNIFICANT CHANGE UP (ref 135–145)
TROPONIN I, HIGH SENSITIVITY RESULT: 6.84 NG/L — SIGNIFICANT CHANGE UP
WBC # BLD: 9.14 K/UL — SIGNIFICANT CHANGE UP (ref 3.8–10.5)
WBC # FLD AUTO: 9.14 K/UL — SIGNIFICANT CHANGE UP (ref 3.8–10.5)

## 2022-10-07 PROCEDURE — 71045 X-RAY EXAM CHEST 1 VIEW: CPT | Mod: 26

## 2022-10-07 PROCEDURE — 93010 ELECTROCARDIOGRAM REPORT: CPT

## 2022-10-07 RX ORDER — SODIUM CHLORIDE 9 MG/ML
1000 INJECTION INTRAMUSCULAR; INTRAVENOUS; SUBCUTANEOUS ONCE
Refills: 0 | Status: COMPLETED | OUTPATIENT
Start: 2022-10-07 | End: 2022-10-07

## 2022-10-07 RX ORDER — KETOROLAC TROMETHAMINE 30 MG/ML
30 SYRINGE (ML) INJECTION ONCE
Refills: 0 | Status: DISCONTINUED | OUTPATIENT
Start: 2022-10-07 | End: 2022-10-07

## 2022-10-07 RX ORDER — FAMOTIDINE 10 MG/ML
20 INJECTION INTRAVENOUS ONCE
Refills: 0 | Status: COMPLETED | OUTPATIENT
Start: 2022-10-07 | End: 2022-10-07

## 2022-10-07 RX ORDER — ONDANSETRON 8 MG/1
1 TABLET, FILM COATED ORAL
Qty: 12 | Refills: 0
Start: 2022-10-07 | End: 2022-10-09

## 2022-10-07 RX ORDER — FAMOTIDINE 10 MG/ML
1 INJECTION INTRAVENOUS
Qty: 10 | Refills: 0
Start: 2022-10-07 | End: 2022-10-11

## 2022-10-07 RX ORDER — ONDANSETRON 8 MG/1
4 TABLET, FILM COATED ORAL ONCE
Refills: 0 | Status: COMPLETED | OUTPATIENT
Start: 2022-10-07 | End: 2022-10-07

## 2022-10-07 RX ADMIN — ONDANSETRON 4 MILLIGRAM(S): 8 TABLET, FILM COATED ORAL at 02:02

## 2022-10-07 RX ADMIN — SODIUM CHLORIDE 1000 MILLILITER(S): 9 INJECTION INTRAMUSCULAR; INTRAVENOUS; SUBCUTANEOUS at 02:02

## 2022-10-07 RX ADMIN — FAMOTIDINE 20 MILLIGRAM(S): 10 INJECTION INTRAVENOUS at 02:02

## 2022-10-07 RX ADMIN — Medication 30 MILLIGRAM(S): at 03:31

## 2022-10-07 NOTE — ED PROVIDER NOTE - PATIENT PORTAL LINK FT
You can access the FollowMyHealth Patient Portal offered by [facility] by registering at the following website: [email or web address]. By joining [employer]’s FollowMyHealth portal, you will also be able to view your health information using other applications (apps) compatible with our system.

## 2022-10-07 NOTE — ED ADULT NURSE NOTE - NSFALLRSKINDICATORS_ED_ALL_ED
ANP Visit Note    Patient Name: Reshma Chilel   YOB: 1965   Medical Record Number: [id number]   CSN: [id number]   Date of visit: 11/16/2018       Chief Complaint/Reason for Visit:  Patient presents with:  Sick Call: Joint inflammation    Tommy Ruvalcaba type diabetes mellitus without mention of complication, not stated as uncontrolled    • Unspecified essential hypertension    • Visual impairment     glasses       Surgical History:  Past Surgical History:   Procedure Laterality Date   • CERVICAL LYMPH NOD day/coffee/tea        Occupational Exposure: Not Asked        Hobby Hazards: Not Asked        Sleep Concern: Not Asked        Stress Concern: Not Asked        Weight Concern: Not Asked        Special Diet: Not Asked        Back Care: Not Asked        Exerc Systems:  A comprehensive 14 point review of systems was completed. Pertinent positives and negatives noted in the the HPI.     Performance Status: ECOG 1    Physical Examination:  General: Patient is alert and oriented x 3, walking gingerly  Vital Signs: % 0.0    Basophil % % 0.0    Immature Granulocyte % % 0.1      Uric Acid 2.4 - 8.7 mg/dL 7.0      Glucose 70 - 99 mg/dL 176 Abnormally high     Sodium 136 - 144 mmol/L 138    Potassium 3.6 - 5.1 mmol/L 4.2    Chloride 101 - 111 mmol/L 104    CO2 22.0 - 32. no

## 2022-10-07 NOTE — ED PROVIDER NOTE - NSICDXPASTMEDICALHX_GEN_ALL_CORE_FT
PAST MEDICAL HISTORY:  Gastritis without bleeding, unspecified chronicity, unspecified gastritis type     HTN (hypertension)     Hyperlipidemia, unspecified hyperlipidemia type     Partial small bowel obstruction

## 2022-10-07 NOTE — ED ADULT NURSE NOTE - DRUG PRE-SCREENING (DAST -1)
Spoke to patient's wife stated they switched PCP, had appt with her yesterday and they went over lab results.   Statement Selected

## 2022-10-07 NOTE — ED PROVIDER NOTE - CLINICAL SUMMARY MEDICAL DECISION MAKING FREE TEXT BOX
Epigastric abdominal pain ; possible gastritis; exam normal.  Labs, IV pepcid and zofran, EKg, CXR and viral swab sent.

## 2022-10-07 NOTE — ED PROVIDER NOTE - CARE PROVIDER_API CALL
Robert Shi)  Gastroenterology; Internal Medicine  [address] [place] [address]  Phone: [phone]  Fax: [phone]  Follow Up Time:

## 2022-10-07 NOTE — ED PROVIDER NOTE - PROGRESS NOTE DETAILS
Patient tolerated PO.  Will send home on zofran and pepcid. Will refer to see GI for possible gastritis vs GERD.

## 2022-10-07 NOTE — ED PROVIDER NOTE - OBJECTIVE STATEMENT
Pt. is a 45 yo M with PMHx of HTN presents with epigastric abdominal pain, nausea ,vomiting X 1 day . Patient ate a sandwich yesterday that upset his stomach and states he worked a full day of work today with stomach cramps and gas pains.  Denies diarrhea . States he ate lentils for dinner and pain worsened with sweats , nausea and feeling of heart racing.  Patient also with congestion, mild cough and palpitations today.

## 2022-12-07 NOTE — ED PROVIDER NOTE - MEDICAL DECISION MAKING DETAILS
Patient achieved a 15% reduction in IOP from pretreatment levels or a plan is in place to achieve this goal. HM p/w 3-4 dd. abd. pain, N.  HH ED eval 2 dd. ago: labs, CT A/P results reviewed.  Pt w/ brief candace urias while in ED today.  EKG, Labs (incl. Delgado X 2), IVF, IV zofran/protonix, observe/reassess.

## 2023-06-10 ENCOUNTER — EMERGENCY (EMERGENCY)
Facility: HOSPITAL | Age: 48
LOS: 0 days | Discharge: HOME | End: 2023-06-11
Attending: FAMILY MEDICINE
Payer: MEDICAID

## 2023-06-10 VITALS — HEIGHT: 66 IN | WEIGHT: 145.06 LBS

## 2023-06-10 DIAGNOSIS — F43.9 REACTION TO SEVERE STRESS, UNSPECIFIED: ICD-10-CM

## 2023-06-10 DIAGNOSIS — E78.5 HYPERLIPIDEMIA, UNSPECIFIED: ICD-10-CM

## 2023-06-10 DIAGNOSIS — R07.89 OTHER CHEST PAIN: ICD-10-CM

## 2023-06-10 DIAGNOSIS — F10.90 ALCOHOL USE, UNSPECIFIED, UNCOMPLICATED: ICD-10-CM

## 2023-06-10 DIAGNOSIS — I10 ESSENTIAL (PRIMARY) HYPERTENSION: ICD-10-CM

## 2023-06-10 DIAGNOSIS — R00.2 PALPITATIONS: ICD-10-CM

## 2023-06-10 DIAGNOSIS — Z98.890 OTHER SPECIFIED POSTPROCEDURAL STATES: Chronic | ICD-10-CM

## 2023-06-10 DIAGNOSIS — F41.9 ANXIETY DISORDER, UNSPECIFIED: ICD-10-CM

## 2023-06-10 LAB
BASOPHILS # BLD AUTO: 0.03 K/UL — SIGNIFICANT CHANGE UP (ref 0–0.2)
BASOPHILS NFR BLD AUTO: 0.5 % — SIGNIFICANT CHANGE UP (ref 0–2)
EOSINOPHIL # BLD AUTO: 0.45 K/UL — SIGNIFICANT CHANGE UP (ref 0–0.5)
EOSINOPHIL NFR BLD AUTO: 7.9 % — HIGH (ref 0–6)
HCT VFR BLD CALC: 43.4 % — SIGNIFICANT CHANGE UP (ref 39–50)
HGB BLD-MCNC: 15.4 G/DL — SIGNIFICANT CHANGE UP (ref 13–17)
IMM GRANULOCYTES NFR BLD AUTO: 0.2 % — SIGNIFICANT CHANGE UP (ref 0–0.9)
LYMPHOCYTES # BLD AUTO: 1.68 K/UL — SIGNIFICANT CHANGE UP (ref 1–3.3)
LYMPHOCYTES # BLD AUTO: 29.5 % — SIGNIFICANT CHANGE UP (ref 13–44)
MCHC RBC-ENTMCNC: 30.4 PG — SIGNIFICANT CHANGE UP (ref 27–34)
MCHC RBC-ENTMCNC: 35.5 GM/DL — SIGNIFICANT CHANGE UP (ref 32–36)
MCV RBC AUTO: 85.8 FL — SIGNIFICANT CHANGE UP (ref 80–100)
MONOCYTES # BLD AUTO: 0.41 K/UL — SIGNIFICANT CHANGE UP (ref 0–0.9)
MONOCYTES NFR BLD AUTO: 7.2 % — SIGNIFICANT CHANGE UP (ref 2–14)
NEUTROPHILS # BLD AUTO: 3.12 K/UL — SIGNIFICANT CHANGE UP (ref 1.8–7.4)
NEUTROPHILS NFR BLD AUTO: 54.7 % — SIGNIFICANT CHANGE UP (ref 43–77)
PCP SPEC-MCNC: SIGNIFICANT CHANGE UP
PLATELET # BLD AUTO: 266 K/UL — SIGNIFICANT CHANGE UP (ref 150–400)
RBC # BLD: 5.06 M/UL — SIGNIFICANT CHANGE UP (ref 4.2–5.8)
RBC # FLD: 12.3 % — SIGNIFICANT CHANGE UP (ref 10.3–14.5)
WBC # BLD: 5.7 K/UL — SIGNIFICANT CHANGE UP (ref 3.8–10.5)
WBC # FLD AUTO: 5.7 K/UL — SIGNIFICANT CHANGE UP (ref 3.8–10.5)

## 2023-06-10 PROCEDURE — 85730 THROMBOPLASTIN TIME PARTIAL: CPT

## 2023-06-10 PROCEDURE — 36415 COLL VENOUS BLD VENIPUNCTURE: CPT

## 2023-06-10 PROCEDURE — 93005 ELECTROCARDIOGRAM TRACING: CPT

## 2023-06-10 PROCEDURE — 80053 COMPREHEN METABOLIC PANEL: CPT

## 2023-06-10 PROCEDURE — 71045 X-RAY EXAM CHEST 1 VIEW: CPT

## 2023-06-10 PROCEDURE — 84484 ASSAY OF TROPONIN QUANT: CPT

## 2023-06-10 PROCEDURE — 93010 ELECTROCARDIOGRAM REPORT: CPT

## 2023-06-10 PROCEDURE — 85379 FIBRIN DEGRADATION QUANT: CPT

## 2023-06-10 PROCEDURE — 85025 COMPLETE CBC W/AUTO DIFF WBC: CPT

## 2023-06-10 PROCEDURE — 81001 URINALYSIS AUTO W/SCOPE: CPT

## 2023-06-10 PROCEDURE — 85610 PROTHROMBIN TIME: CPT

## 2023-06-10 PROCEDURE — 83735 ASSAY OF MAGNESIUM: CPT

## 2023-06-10 PROCEDURE — 80307 DRUG TEST PRSMV CHEM ANLYZR: CPT

## 2023-06-10 PROCEDURE — 99291 CRITICAL CARE FIRST HOUR: CPT

## 2023-06-10 RX ORDER — SODIUM CHLORIDE 9 MG/ML
3 INJECTION INTRAMUSCULAR; INTRAVENOUS; SUBCUTANEOUS ONCE
Refills: 0 | Status: COMPLETED | OUTPATIENT
Start: 2023-06-10 | End: 2023-06-10

## 2023-06-10 RX ADMIN — SODIUM CHLORIDE 3 MILLILITER(S): 9 INJECTION INTRAMUSCULAR; INTRAVENOUS; SUBCUTANEOUS at 23:42

## 2023-06-10 NOTE — ED PROVIDER NOTE - OBJECTIVE STATEMENT
pt is a 48 yo hm with hx htn on amlodipine who c/o intermittent palpitations and chest pain since yesterday. Pain is left sided nonradiating not inc or dec by anything. Pt c/o inc stress due to working multiple jobs. No fh of early heart disease. pt feels as if his bp is up. No injury. Drinks one drink daily. Nonsmoker and no drugs.

## 2023-06-10 NOTE — ED PROVIDER NOTE - CARE PLAN
1 Principal Discharge DX:	Anxiety   Principal Discharge DX:	Anxiety  Secondary Diagnosis:	Labile essential hypertension  Secondary Diagnosis:	Chest pain

## 2023-06-10 NOTE — ED PROVIDER NOTE - CRITICAL CARE ATTENDING CONTRIBUTION TO CARE
Critical care time spent ordering and interpreting diagnostics, ordering therapeutics, reviewing old records, speaking to patient and documenting. Kannan BENAVIDES

## 2023-06-10 NOTE — ED ADULT NURSE NOTE - OBJECTIVE STATEMENT
Pt to Ed with c/o chest discomfort.; states he started having some abd discomfort with nausea yesterday and today he vomited while having dinner. He attempted to go to bed but started top feel his heart racing. Endorses hx HTN and states he takes his amlodipine daily.

## 2023-06-10 NOTE — ED ADULT NURSE NOTE - HOW OFTEN DO YOU HAVE A DRINK CONTAINING ALCOHOL?
Problem: Knowledge deficit - Parent/Caregiver  Goal: Family verbalizes understanding of infant's condition  Parents updated at bedside regarding pump feedings over 30 minutes as well as AM lab work and CXR for tomorrow.     Problem: Oxygenation/Respiratory Function  Goal: Optimized air exchange  Infant tolerating Vapotherm 4L/min today without apnea nor bradycardia. FiO2 36-50% this shift. Infant continues to have increased work of breathing with tachypnea and moderate retractions present.     Problem: Skin Integrity  Goal: Skin Integrity is maintained or improved  Per discussion between Dr. Gordon and Dr. Nunez today, OK to remove abdominal binder.    Problem: Nutrition/Feeding  Goal: Tolerating transition to enteral feedings  Infant tolerating feeds of 60 mls MBM Q 3 hrs. All feeds gavaged via OG. Feeds placed on pump today over 30 minutes as ordered to decrease oxygen desaturations during feedings.        Monthly or less

## 2023-06-10 NOTE — ED ADULT TRIAGE NOTE - CHIEF COMPLAINT QUOTE
Pt is A&O x 3, Honduran and English speaking, states he has intermittent chest pains that started yesterday and vomited once today. Pt states he thinks he has BP because of his symptoms. Pt is A&O x 3, Sammarinese and English speaking, states he has intermittent chest pains that started yesterday and vomited once today. Pt states he thinks he has BP because of his symptoms. BP elevated in triage as per flow sheet. Pt states he takes Amlodipine 10 mg daily for high BP and took it today at 1600.

## 2023-06-10 NOTE — ED ADULT NURSE NOTE - NSFALLUNIVINTERV_ED_ALL_ED
Bed/Stretcher in lowest position, wheels locked, appropriate side rails in place/Call bell, personal items and telephone in reach/Instruct patient to call for assistance before getting out of bed/chair/stretcher/Non-slip footwear applied when patient is off stretcher/[place] to call system/Physically safe environment - no spills, clutter or unnecessary equipment/Purposeful proactive rounding/Room/bathroom lighting operational, light cord in reach

## 2023-06-10 NOTE — ED PROVIDER NOTE - PATIENT PORTAL LINK FT
You can access the FollowMyHealth Patient Portal offered by [facility] by registering at the following website: [email or web address]. By joining [employer]’s FollowMyHealth portal, you will also be able to view your health information using other applications (apps) compatible with our system.

## 2023-06-10 NOTE — ED PROVIDER NOTE - PROGRESS NOTE DETAILS
pt so by Dr. Brunner pending second troponin, if negative may d/c . second troponin is negative will d/c pt B Fabiola TRINIDAD

## 2023-06-10 NOTE — ED ADULT NURSE NOTE - CHIEF COMPLAINT QUOTE
Pt is A&O x 3, Latvian and English speaking, states he has intermittent chest pains that started yesterday and vomited once today. Pt states he thinks he has BP because of his symptoms. BP elevated in triage as per flow sheet. Pt states he takes Amlodipine 10 mg daily for high BP and took it today at 1600.

## 2023-06-10 NOTE — ED ADULT NURSE NOTE - NSFALLRISKFACTORS_ED_ALL_ED
Dr Bhatt called the unit for update on placenta.  Notified it has not delivered still.  Meanwhile - pt is calling out saying she thinks the placenta is coming out.      In room to check - just some clots.  Pt up to bathroom.  Cleaned up.   No indicators present

## 2023-06-10 NOTE — ED PROVIDER NOTE - DISCHARGE REVIEW MATERIAL PRESENTED
I have personally performed a face to face diagnostic evaluation on this patient. I have reviewed the ACP note and agree with the history, exam and plan of care, except as noted.
.

## 2023-06-11 VITALS
RESPIRATION RATE: 16 BRPM | OXYGEN SATURATION: 98 % | TEMPERATURE: 98 F | SYSTOLIC BLOOD PRESSURE: 123 MMHG | DIASTOLIC BLOOD PRESSURE: 91 MMHG | HEART RATE: 60 BPM

## 2023-06-11 LAB
ALBUMIN SERPL ELPH-MCNC: 3.5 G/DL — SIGNIFICANT CHANGE UP (ref 3.3–5)
ALP SERPL-CCNC: 84 U/L — SIGNIFICANT CHANGE UP (ref 40–120)
ALT FLD-CCNC: 43 U/L — SIGNIFICANT CHANGE UP (ref 12–78)
AMPHET UR-MCNC: NEGATIVE — SIGNIFICANT CHANGE UP
ANION GAP SERPL CALC-SCNC: 6 MMOL/L — SIGNIFICANT CHANGE UP (ref 5–17)
APPEARANCE UR: CLEAR — SIGNIFICANT CHANGE UP
APTT BLD: 35.5 SEC — SIGNIFICANT CHANGE UP (ref 27.5–35.5)
AST SERPL-CCNC: 32 U/L — SIGNIFICANT CHANGE UP (ref 15–37)
BACTERIA # UR AUTO: ABNORMAL
BARBITURATES UR SCN-MCNC: NEGATIVE — SIGNIFICANT CHANGE UP
BENZODIAZ UR-MCNC: NEGATIVE — SIGNIFICANT CHANGE UP
BILIRUB SERPL-MCNC: 0.3 MG/DL — SIGNIFICANT CHANGE UP (ref 0.2–1.2)
BILIRUB UR-MCNC: NEGATIVE — SIGNIFICANT CHANGE UP
BUN SERPL-MCNC: 18 MG/DL — SIGNIFICANT CHANGE UP (ref 7–23)
CALCIUM SERPL-MCNC: 8.7 MG/DL — SIGNIFICANT CHANGE UP (ref 8.5–10.1)
CHLORIDE SERPL-SCNC: 106 MMOL/L — SIGNIFICANT CHANGE UP (ref 96–108)
CO2 SERPL-SCNC: 27 MMOL/L — SIGNIFICANT CHANGE UP (ref 22–31)
COCAINE METAB.OTHER UR-MCNC: NEGATIVE — SIGNIFICANT CHANGE UP
COLOR SPEC: YELLOW — SIGNIFICANT CHANGE UP
CREAT SERPL-MCNC: 1.08 MG/DL — SIGNIFICANT CHANGE UP (ref 0.5–1.3)
D DIMER BLD IA.RAPID-MCNC: <150 NG/ML DDU — SIGNIFICANT CHANGE UP
DIFF PNL FLD: ABNORMAL
EGFR: 85 ML/MIN/1.73M2 — SIGNIFICANT CHANGE UP
EPI CELLS # UR: NEGATIVE — SIGNIFICANT CHANGE UP
GLUCOSE SERPL-MCNC: 115 MG/DL — HIGH (ref 70–99)
GLUCOSE UR QL: NEGATIVE — SIGNIFICANT CHANGE UP
INR BLD: 1 RATIO — SIGNIFICANT CHANGE UP (ref 0.88–1.16)
KETONES UR-MCNC: NEGATIVE — SIGNIFICANT CHANGE UP
LEUKOCYTE ESTERASE UR-ACNC: NEGATIVE — SIGNIFICANT CHANGE UP
MAGNESIUM SERPL-MCNC: 2.1 MG/DL — SIGNIFICANT CHANGE UP (ref 1.6–2.6)
METHADONE UR-MCNC: NEGATIVE — SIGNIFICANT CHANGE UP
NITRITE UR-MCNC: NEGATIVE — SIGNIFICANT CHANGE UP
OPIATES UR-MCNC: NEGATIVE — SIGNIFICANT CHANGE UP
PCP UR-MCNC: NEGATIVE — SIGNIFICANT CHANGE UP
PH UR: 7 — SIGNIFICANT CHANGE UP (ref 5–8)
POTASSIUM SERPL-MCNC: 3.1 MMOL/L — LOW (ref 3.5–5.3)
POTASSIUM SERPL-SCNC: 3.1 MMOL/L — LOW (ref 3.5–5.3)
PROT SERPL-MCNC: 7 GM/DL — SIGNIFICANT CHANGE UP (ref 6–8.3)
PROT UR-MCNC: 100
PROTHROM AB SERPL-ACNC: 11.6 SEC — SIGNIFICANT CHANGE UP (ref 10.5–13.4)
RBC CASTS # UR COMP ASSIST: ABNORMAL /HPF (ref 0–4)
SODIUM SERPL-SCNC: 139 MMOL/L — SIGNIFICANT CHANGE UP (ref 135–145)
SP GR SPEC: 1 — LOW (ref 1.01–1.02)
THC UR QL: NEGATIVE — SIGNIFICANT CHANGE UP
TROPONIN I, HIGH SENSITIVITY RESULT: 4.77 NG/L — SIGNIFICANT CHANGE UP
TROPONIN I, HIGH SENSITIVITY RESULT: 5.75 NG/L — SIGNIFICANT CHANGE UP
UROBILINOGEN FLD QL: NEGATIVE — SIGNIFICANT CHANGE UP
WBC UR QL: SIGNIFICANT CHANGE UP /HPF (ref 0–5)

## 2023-06-11 PROCEDURE — 71045 X-RAY EXAM CHEST 1 VIEW: CPT | Mod: 26

## 2023-06-11 RX ORDER — METOPROLOL TARTRATE 50 MG
5 TABLET ORAL ONCE
Refills: 0 | Status: COMPLETED | OUTPATIENT
Start: 2023-06-11 | End: 2023-06-11

## 2023-06-11 RX ORDER — POTASSIUM CHLORIDE 20 MEQ
20 PACKET (EA) ORAL ONCE
Refills: 0 | Status: COMPLETED | OUTPATIENT
Start: 2023-06-11 | End: 2023-06-11

## 2023-06-11 RX ADMIN — Medication 20 MILLIEQUIVALENT(S): at 04:26

## 2023-06-15 PROBLEM — Z86.79 PERSONAL HISTORY OF OTHER DISEASES OF THE CIRCULATORY SYSTEM: Chronic | Status: ACTIVE | Noted: 2023-06-11

## 2023-06-15 PROBLEM — E78.5 HYPERLIPIDEMIA, UNSPECIFIED: Chronic | Status: ACTIVE | Noted: 2023-06-11

## 2023-06-22 ENCOUNTER — APPOINTMENT (OUTPATIENT)
Dept: COLORECTAL SURGERY | Facility: CLINIC | Age: 48
End: 2023-06-22

## 2023-06-29 NOTE — ED ADULT NURSE NOTE - IS THE PATIENT ABLE TO BE SCREENED?
Quality 110: Preventive Care And Screening: Influenza Immunization: Influenza Immunization Administered during Influenza season
Quality 431: Preventive Care And Screening: Unhealthy Alcohol Use - Screening: Patient not identified as an unhealthy alcohol user when screened for unhealthy alcohol use using a systematic screening method
Detail Level: Detailed
Quality 402: Tobacco Use And Help With Quitting Among Adolescents: Patient screened for tobacco and never smoked
Quality 130: Documentation Of Current Medications In The Medical Record: Current Medications Documented
Quality 111:Pneumonia Vaccination Status For Older Adults: Patient received any pneumococcal conjugate or polysaccharide vaccine on or after their 60th birthday and before the end of the measurement period
Yes

## 2023-10-16 NOTE — ED PROVIDER NOTE - CROS ED NEURO POS
Current NIHSS 0    Nursing Core Measures for Stroke:   [x]   Education template documentation (STROKE/TIA). Please select only risk factors that are applicable to patient when selecting risk factors. [x]   Care Plan template documentation (Physiologic Instability - Neurosensory). Selecting this will add care plan rows to the flowsheet under the Neuro section of Head to Toe. [x]   Verified Swallow Screen completed prior to PO intake of food, drink, medications  []   VTE Prophylaxis: SCDs ordered/addressed; SCDs:               (As a reminder, ASA, Plavix, and TPA/TNK are not VTE prophylaxis.)    Reviewed the Following Education with Patient and/or Family:   - Personalized risk factors for patient, along with changes, modifications that will help prevent stroke. - Signs and Symptoms of Stroke: (Facial droop, weakness/numbness especially on one side, speech difficulty, sudden confusion, sudden loss of vision, sudden severe headache, sudden loss of balance or having difficulty walking, syncope, or seizure)  - How to activate EMS (911)   - Importance of Follow Up Appointments at Discharge   - Importance of Compliance with Medications Prescribed at Discharge  - Available community resources and stroke advocacy groups if needed    Patient and/or family member: verbalized understanding. Stroke Education booklet given to patient/family (or verified, if given already), which reviews above information.  yes         Electronically signed by Chung Fuentes RN on 10/16/2023 at 12:44 AM dizziness

## 2023-11-30 NOTE — ED ADULT NURSE NOTE - MODE OF DISCHARGE
November 30, 2023  Danuta Valadez  [address]  [place] [address]                [place] [facility]  [address], [address]  [place] [address]  Phone: [phone]  Fax: [phone] Danuta Valadez was seen and treated in our Urgent Care department on 11/30/2023. She may return to school in 2 - 3 days.      If you have any questions or concerns, please don't hesitate to call.        Sincerely,        Luciano Carr MD          
Ambulatory

## 2024-02-08 NOTE — ED ADULT TRIAGE NOTE - BMI (KG/M2)
February 8, 2024     Patient: Brit Win   YOB: 1996   Date of Visit: 2/6/2024       To Whom It May Concern:    It is my medical opinion that Brit Win may return to work on 02/09/2024.           Sincerely,             24.9

## 2024-05-31 NOTE — ED PROVIDER NOTE - HEAD, MLM
Called and spoke with patient and let her know that Dr Young would like her to continue taking the medication until she has her appt on Mon.  Pt stated understanding.   Head is atraumatic. Head shape is symmetrical.

## 2024-10-21 ENCOUNTER — OUTPATIENT (OUTPATIENT)
Dept: OUTPATIENT SERVICES | Facility: HOSPITAL | Age: 49
LOS: 1 days | End: 2024-10-21
Payer: SELF-PAY

## 2024-10-21 ENCOUNTER — APPOINTMENT (OUTPATIENT)
Dept: UROLOGY | Facility: HOSPITAL | Age: 49
End: 2024-10-21

## 2024-10-21 VITALS — SYSTOLIC BLOOD PRESSURE: 170 MMHG | DIASTOLIC BLOOD PRESSURE: 110 MMHG

## 2024-10-21 VITALS
OXYGEN SATURATION: 97 % | WEIGHT: 150 LBS | RESPIRATION RATE: 16 BRPM | TEMPERATURE: 98.4 F | HEART RATE: 91 BPM | BODY MASS INDEX: 24.99 KG/M2 | HEIGHT: 65 IN

## 2024-10-21 DIAGNOSIS — Z98.890 OTHER SPECIFIED POSTPROCEDURAL STATES: Chronic | ICD-10-CM

## 2024-10-21 DIAGNOSIS — N40.0 BENIGN PROSTATIC HYPERPLASIA WITHOUT LOWER URINARY TRACT SYMPMS: ICD-10-CM

## 2024-10-21 DIAGNOSIS — R30.0 DYSURIA: ICD-10-CM

## 2024-10-21 DIAGNOSIS — N40.1 BENIGN PROSTATIC HYPERPLASIA WITH LOWER URINARY TRACT SYMPMS: ICD-10-CM

## 2024-10-21 DIAGNOSIS — N13.8 BENIGN PROSTATIC HYPERPLASIA WITH LOWER URINARY TRACT SYMPMS: ICD-10-CM

## 2024-10-21 LAB — PSA SERPL-MCNC: 2.24 NG/ML — SIGNIFICANT CHANGE UP (ref 0–4)

## 2024-10-21 PROCEDURE — G0463: CPT

## 2024-10-21 PROCEDURE — 36415 COLL VENOUS BLD VENIPUNCTURE: CPT

## 2024-10-21 PROCEDURE — 84153 ASSAY OF PSA TOTAL: CPT

## 2024-10-21 RX ORDER — AMLODIPINE BESYLATE 10 MG/1
10 TABLET ORAL DAILY
Refills: 0 | Status: ACTIVE | COMMUNITY
Start: 2024-10-21

## 2024-10-21 NOTE — PHYSICAL EXAM
[Normal Appearance] : normal appearance [Well Groomed] : well groomed [General Appearance - In No Acute Distress] : no acute distress [Edema] : no peripheral edema [Respiration, Rhythm And Depth] : normal respiratory rhythm and effort [Exaggerated Use Of Accessory Muscles For Inspiration] : no accessory muscle use [Abdomen Soft] : soft [Abdomen Tenderness] : non-tender [Costovertebral Angle Tenderness] : no ~M costovertebral angle tenderness [Urethral Meatus] : meatus normal [Penis Abnormality] : normal uncircumcised penis [Epididymis] : the epididymides were normal [Testes Tenderness] : no tenderness of the testes [Testes Mass (___cm)] : there were no testicular masses [Anus Abnormality] : the anus and perineum were normal [Rectal Exam - Rectum] : digital rectal exam was normal [No Prostate Nodules] : no prostate nodules [Prostate Size ___ gm] : prostate size [unfilled] gm [Normal Station and Gait] : the gait and station were normal for the patient's age [] : no rash [No Focal Deficits] : no focal deficits [Oriented To Time, Place, And Person] : oriented to person, place, and time [Affect] : the affect was normal [Mood] : the mood was normal [No Palpable Adenopathy] : no palpable adenopathy [Chaperone Present] : A chaperone was present in the examining room during all aspects of the physical examination [FreeTextEntry2] : Nilson Luis MD

## 2024-10-21 NOTE — ASSESSMENT
[FreeTextEntry1] : 49 y.o, M who presents for PSA screening, LUTS - PSA - BIJAN wnl - NO LUTS  will call w/ PSA results

## 2024-10-21 NOTE — HISTORY OF PRESENT ILLNESS
[FreeTextEntry1] : Pt is a 50 yo male referred to the Urology clinic due to findings of "enlarged prostate" on CT obtained for different reason.   Pt denies any  symptoms: there is no dysuria, hematuria, frequency, urgency, nocturia or feeling of incomplete bladder emptying. Pt states that he never had prostate ca screening previously. Pt admits that his father passed away from prostate ca at age of 73.

## 2024-10-23 DIAGNOSIS — N40.1 BENIGN PROSTATIC HYPERPLASIA WITH LOWER URINARY TRACT SYMPTOMS: ICD-10-CM

## 2024-10-30 ENCOUNTER — APPOINTMENT (OUTPATIENT)
Dept: CARDIOLOGY | Facility: HOSPITAL | Age: 49
End: 2024-10-30

## 2024-11-08 ENCOUNTER — INPATIENT (INPATIENT)
Facility: HOSPITAL | Age: 49
LOS: 0 days | Discharge: HOME | DRG: 198 | End: 2024-11-09
Attending: INTERNAL MEDICINE | Admitting: FAMILY MEDICINE
Payer: MEDICAID

## 2024-11-08 VITALS
OXYGEN SATURATION: 100 % | WEIGHT: 146.39 LBS | TEMPERATURE: 99 F | SYSTOLIC BLOOD PRESSURE: 167 MMHG | RESPIRATION RATE: 19 BRPM | DIASTOLIC BLOOD PRESSURE: 98 MMHG | HEART RATE: 72 BPM

## 2024-11-08 DIAGNOSIS — R07.9 CHEST PAIN, UNSPECIFIED: ICD-10-CM

## 2024-11-08 DIAGNOSIS — Z98.890 OTHER SPECIFIED POSTPROCEDURAL STATES: Chronic | ICD-10-CM

## 2024-11-08 LAB
ALBUMIN SERPL ELPH-MCNC: 3.7 G/DL — SIGNIFICANT CHANGE UP (ref 3.3–5)
ALP SERPL-CCNC: 82 U/L — SIGNIFICANT CHANGE UP (ref 40–120)
ALT FLD-CCNC: 54 U/L — SIGNIFICANT CHANGE UP (ref 12–78)
ANION GAP SERPL CALC-SCNC: 3 MMOL/L — LOW (ref 5–17)
AST SERPL-CCNC: 41 U/L — HIGH (ref 15–37)
BASOPHILS # BLD AUTO: 0.05 K/UL — SIGNIFICANT CHANGE UP (ref 0–0.2)
BASOPHILS NFR BLD AUTO: 0.6 % — SIGNIFICANT CHANGE UP (ref 0–2)
BILIRUB SERPL-MCNC: 0.3 MG/DL — SIGNIFICANT CHANGE UP (ref 0.2–1.2)
BUN SERPL-MCNC: 16 MG/DL — SIGNIFICANT CHANGE UP (ref 7–23)
CALCIUM SERPL-MCNC: 9.3 MG/DL — SIGNIFICANT CHANGE UP (ref 8.5–10.1)
CHLORIDE SERPL-SCNC: 107 MMOL/L — SIGNIFICANT CHANGE UP (ref 96–108)
CO2 SERPL-SCNC: 29 MMOL/L — SIGNIFICANT CHANGE UP (ref 22–31)
CREAT SERPL-MCNC: 0.97 MG/DL — SIGNIFICANT CHANGE UP (ref 0.5–1.3)
EGFR: 96 ML/MIN/1.73M2 — SIGNIFICANT CHANGE UP
EGFR: 96 ML/MIN/1.73M2 — SIGNIFICANT CHANGE UP
EOSINOPHIL # BLD AUTO: 0.36 K/UL — SIGNIFICANT CHANGE UP (ref 0–0.5)
EOSINOPHIL NFR BLD AUTO: 4.3 % — SIGNIFICANT CHANGE UP (ref 0–6)
GLUCOSE SERPL-MCNC: 94 MG/DL — SIGNIFICANT CHANGE UP (ref 70–99)
HCT VFR BLD CALC: 45.2 % — SIGNIFICANT CHANGE UP (ref 39–50)
HGB BLD-MCNC: 15.6 G/DL — SIGNIFICANT CHANGE UP (ref 13–17)
IMM GRANULOCYTES NFR BLD AUTO: 0.1 % — SIGNIFICANT CHANGE UP (ref 0–0.9)
LYMPHOCYTES # BLD AUTO: 1.72 K/UL — SIGNIFICANT CHANGE UP (ref 1–3.3)
LYMPHOCYTES # BLD AUTO: 20.6 % — SIGNIFICANT CHANGE UP (ref 13–44)
MAGNESIUM SERPL-MCNC: 2.1 MG/DL — SIGNIFICANT CHANGE UP (ref 1.6–2.6)
MCHC RBC-ENTMCNC: 30.1 PG — SIGNIFICANT CHANGE UP (ref 27–34)
MCHC RBC-ENTMCNC: 34.5 G/DL — SIGNIFICANT CHANGE UP (ref 32–36)
MCV RBC AUTO: 87.3 FL — SIGNIFICANT CHANGE UP (ref 80–100)
MONOCYTES # BLD AUTO: 0.67 K/UL — SIGNIFICANT CHANGE UP (ref 0–0.9)
MONOCYTES NFR BLD AUTO: 8 % — SIGNIFICANT CHANGE UP (ref 2–14)
NEUTROPHILS # BLD AUTO: 5.54 K/UL — SIGNIFICANT CHANGE UP (ref 1.8–7.4)
NEUTROPHILS NFR BLD AUTO: 66.4 % — SIGNIFICANT CHANGE UP (ref 43–77)
NT-PROBNP SERPL-SCNC: 30 PG/ML — SIGNIFICANT CHANGE UP (ref 0–125)
PLATELET # BLD AUTO: 301 K/UL — SIGNIFICANT CHANGE UP (ref 150–400)
POTASSIUM SERPL-MCNC: 4.1 MMOL/L — SIGNIFICANT CHANGE UP (ref 3.5–5.3)
POTASSIUM SERPL-SCNC: 4.1 MMOL/L — SIGNIFICANT CHANGE UP (ref 3.5–5.3)
PROT SERPL-MCNC: 7.1 GM/DL — SIGNIFICANT CHANGE UP (ref 6–8.3)
RBC # BLD: 5.18 M/UL — SIGNIFICANT CHANGE UP (ref 4.2–5.8)
RBC # FLD: 12.3 % — SIGNIFICANT CHANGE UP (ref 10.3–14.5)
SODIUM SERPL-SCNC: 139 MMOL/L — SIGNIFICANT CHANGE UP (ref 135–145)
TROPONIN I, HIGH SENSITIVITY RESULT: 7.24 NG/L — SIGNIFICANT CHANGE UP
TROPONIN I, HIGH SENSITIVITY RESULT: 7.4 NG/L — SIGNIFICANT CHANGE UP
WBC # BLD: 8.35 K/UL — SIGNIFICANT CHANGE UP (ref 3.8–10.5)
WBC # FLD AUTO: 8.35 K/UL — SIGNIFICANT CHANGE UP (ref 3.8–10.5)

## 2024-11-08 PROCEDURE — 93306 TTE W/DOPPLER COMPLETE: CPT

## 2024-11-08 PROCEDURE — 86803 HEPATITIS C AB TEST: CPT

## 2024-11-08 PROCEDURE — 93010 ELECTROCARDIOGRAM REPORT: CPT

## 2024-11-08 PROCEDURE — 80048 BASIC METABOLIC PNL TOTAL CA: CPT

## 2024-11-08 PROCEDURE — 85027 COMPLETE CBC AUTOMATED: CPT

## 2024-11-08 PROCEDURE — 93005 ELECTROCARDIOGRAM TRACING: CPT

## 2024-11-08 PROCEDURE — 71046 X-RAY EXAM CHEST 2 VIEWS: CPT | Mod: 26

## 2024-11-08 PROCEDURE — 93356 MYOCRD STRAIN IMG SPCKL TRCK: CPT

## 2024-11-08 PROCEDURE — 87389 HIV-1 AG W/HIV-1&-2 AB AG IA: CPT

## 2024-11-08 PROCEDURE — 84439 ASSAY OF FREE THYROXINE: CPT

## 2024-11-08 PROCEDURE — 36415 COLL VENOUS BLD VENIPUNCTURE: CPT

## 2024-11-08 PROCEDURE — 99285 EMERGENCY DEPT VISIT HI MDM: CPT

## 2024-11-08 PROCEDURE — 84443 ASSAY THYROID STIM HORMONE: CPT

## 2024-11-08 PROCEDURE — 76376 3D RENDER W/INTRP POSTPROCES: CPT

## 2024-11-08 PROCEDURE — 84484 ASSAY OF TROPONIN QUANT: CPT

## 2024-11-08 RX ORDER — ASPIRIN 325 MG
324 TABLET ORAL ONCE
Refills: 0 | Status: COMPLETED | OUTPATIENT
Start: 2024-11-08 | End: 2024-11-08

## 2024-11-08 RX ADMIN — Medication 324 MILLIGRAM(S): at 18:59

## 2024-11-09 ENCOUNTER — RESULT REVIEW (OUTPATIENT)
Age: 49
End: 2024-11-09

## 2024-11-09 ENCOUNTER — TRANSCRIPTION ENCOUNTER (OUTPATIENT)
Age: 49
End: 2024-11-09

## 2024-11-09 VITALS
SYSTOLIC BLOOD PRESSURE: 127 MMHG | RESPIRATION RATE: 18 BRPM | TEMPERATURE: 98 F | HEART RATE: 74 BPM | OXYGEN SATURATION: 98 % | DIASTOLIC BLOOD PRESSURE: 91 MMHG

## 2024-11-09 DIAGNOSIS — R07.89 OTHER CHEST PAIN: ICD-10-CM

## 2024-11-09 DIAGNOSIS — E78.5 HYPERLIPIDEMIA, UNSPECIFIED: ICD-10-CM

## 2024-11-09 DIAGNOSIS — I10 ESSENTIAL (PRIMARY) HYPERTENSION: ICD-10-CM

## 2024-11-09 LAB
ADD ON TEST-SPECIMEN IN LAB: SIGNIFICANT CHANGE UP
ANION GAP SERPL CALC-SCNC: 6 MMOL/L — SIGNIFICANT CHANGE UP (ref 5–17)
BUN SERPL-MCNC: 16 MG/DL — SIGNIFICANT CHANGE UP (ref 7–23)
CALCIUM SERPL-MCNC: 9.2 MG/DL — SIGNIFICANT CHANGE UP (ref 8.5–10.1)
CHLORIDE SERPL-SCNC: 106 MMOL/L — SIGNIFICANT CHANGE UP (ref 96–108)
CO2 SERPL-SCNC: 27 MMOL/L — SIGNIFICANT CHANGE UP (ref 22–31)
CREAT SERPL-MCNC: 0.86 MG/DL — SIGNIFICANT CHANGE UP (ref 0.5–1.3)
EGFR: 106 ML/MIN/1.73M2 — SIGNIFICANT CHANGE UP
EGFR: 106 ML/MIN/1.73M2 — SIGNIFICANT CHANGE UP
GLUCOSE SERPL-MCNC: 124 MG/DL — HIGH (ref 70–99)
HCT VFR BLD CALC: 46.1 % — SIGNIFICANT CHANGE UP (ref 39–50)
HCV AB S/CO SERPL IA: 0.14 S/CO — SIGNIFICANT CHANGE UP (ref 0–0.99)
HCV AB SERPL-IMP: SIGNIFICANT CHANGE UP
HGB BLD-MCNC: 16 G/DL — SIGNIFICANT CHANGE UP (ref 13–17)
HIV 1+2 AB+HIV1 P24 AG SERPL QL IA: SIGNIFICANT CHANGE UP
MCHC RBC-ENTMCNC: 30.3 PG — SIGNIFICANT CHANGE UP (ref 27–34)
MCHC RBC-ENTMCNC: 34.7 G/DL — SIGNIFICANT CHANGE UP (ref 32–36)
MCV RBC AUTO: 87.3 FL — SIGNIFICANT CHANGE UP (ref 80–100)
PLATELET # BLD AUTO: 290 K/UL — SIGNIFICANT CHANGE UP (ref 150–400)
POTASSIUM SERPL-MCNC: 3.7 MMOL/L — SIGNIFICANT CHANGE UP (ref 3.5–5.3)
POTASSIUM SERPL-SCNC: 3.7 MMOL/L — SIGNIFICANT CHANGE UP (ref 3.5–5.3)
RBC # BLD: 5.28 M/UL — SIGNIFICANT CHANGE UP (ref 4.2–5.8)
RBC # FLD: 12.5 % — SIGNIFICANT CHANGE UP (ref 10.3–14.5)
SODIUM SERPL-SCNC: 139 MMOL/L — SIGNIFICANT CHANGE UP (ref 135–145)
T4 FREE SERPL-MCNC: 1.07 NG/DL — SIGNIFICANT CHANGE UP (ref 0.76–1.46)
TROPONIN I, HIGH SENSITIVITY RESULT: 5.16 NG/L — SIGNIFICANT CHANGE UP
TSH SERPL-MCNC: 1.26 UU/ML — SIGNIFICANT CHANGE UP (ref 0.34–4.82)
WBC # BLD: 6.05 K/UL — SIGNIFICANT CHANGE UP (ref 3.8–10.5)
WBC # FLD AUTO: 6.05 K/UL — SIGNIFICANT CHANGE UP (ref 3.8–10.5)

## 2024-11-09 PROCEDURE — 99222 1ST HOSP IP/OBS MODERATE 55: CPT

## 2024-11-09 PROCEDURE — 93306 TTE W/DOPPLER COMPLETE: CPT | Mod: 26

## 2024-11-09 PROCEDURE — 93010 ELECTROCARDIOGRAM REPORT: CPT

## 2024-11-09 PROCEDURE — 93356 MYOCRD STRAIN IMG SPCKL TRCK: CPT

## 2024-11-09 PROCEDURE — 76376 3D RENDER W/INTRP POSTPROCES: CPT | Mod: 26

## 2024-11-09 PROCEDURE — 99236 HOSP IP/OBS SAME DATE HI 85: CPT

## 2024-11-09 RX ORDER — LOSARTAN POTASSIUM 100 MG/1
1 TABLET, FILM COATED ORAL
Qty: 0 | Refills: 0 | DISCHARGE
Start: 2024-11-09

## 2024-11-09 RX ORDER — ATORVASTATIN CALCIUM 80 MG/1
0 TABLET, FILM COATED ORAL
Qty: 0 | Refills: 0 | DISCHARGE

## 2024-11-09 RX ORDER — ATORVASTATIN CALCIUM 80 MG/1
10 TABLET, FILM COATED ORAL AT BEDTIME
Refills: 0 | Status: DISCONTINUED | OUTPATIENT
Start: 2024-11-09 | End: 2024-11-09

## 2024-11-09 RX ORDER — AMLODIPINE BESYLATE 10 MG/1
1 TABLET ORAL
Qty: 14 | Refills: 0
Start: 2024-11-09 | End: 2024-11-22

## 2024-11-09 RX ORDER — LOSARTAN POTASSIUM 100 MG/1
100 TABLET, FILM COATED ORAL DAILY
Refills: 0 | Status: DISCONTINUED | OUTPATIENT
Start: 2024-11-09 | End: 2024-11-09

## 2024-11-09 RX ORDER — ATORVASTATIN CALCIUM 80 MG/1
1 TABLET, FILM COATED ORAL
Qty: 0 | Refills: 0 | DISCHARGE
Start: 2024-11-09

## 2024-11-09 RX ORDER — ASPIRIN 325 MG
325 TABLET ORAL DAILY
Refills: 0 | Status: DISCONTINUED | OUTPATIENT
Start: 2024-11-09 | End: 2024-11-09

## 2024-11-09 RX ORDER — ESCITALOPRAM OXALATE 20 MG/1
5 TABLET ORAL DAILY
Refills: 0 | Status: DISCONTINUED | OUTPATIENT
Start: 2024-11-09 | End: 2024-11-09

## 2024-11-09 RX ORDER — LOSARTAN POTASSIUM 100 MG/1
0 TABLET, FILM COATED ORAL
Qty: 0 | Refills: 1 | DISCHARGE

## 2024-11-09 RX ORDER — ASPIRIN 325 MG
1 TABLET ORAL
Qty: 30 | Refills: 0
Start: 2024-11-09 | End: 2024-12-08

## 2024-11-09 RX ORDER — ESCITALOPRAM OXALATE 20 MG/1
1 TABLET ORAL
Qty: 0 | Refills: 0 | DISCHARGE
Start: 2024-11-09

## 2024-11-09 RX ORDER — SENNA 187 MG
2 TABLET ORAL AT BEDTIME
Refills: 0 | Status: DISCONTINUED | OUTPATIENT
Start: 2024-11-09 | End: 2024-11-09

## 2024-11-09 RX ORDER — ACETAMINOPHEN 500 MG/5ML
650 LIQUID (ML) ORAL EVERY 6 HOURS
Refills: 0 | Status: DISCONTINUED | OUTPATIENT
Start: 2024-11-09 | End: 2024-11-09

## 2024-11-09 RX ORDER — ESCITALOPRAM OXALATE 20 MG/1
0 TABLET ORAL
Qty: 0 | Refills: 1 | DISCHARGE

## 2024-11-09 RX ORDER — AMLODIPINE BESYLATE 10 MG/1
10 TABLET ORAL DAILY
Refills: 0 | Status: DISCONTINUED | OUTPATIENT
Start: 2024-11-09 | End: 2024-11-09

## 2024-11-09 RX ADMIN — LOSARTAN POTASSIUM 100 MILLIGRAM(S): 100 TABLET, FILM COATED ORAL at 11:48

## 2024-11-09 RX ADMIN — ESCITALOPRAM OXALATE 5 MILLIGRAM(S): 20 TABLET ORAL at 11:49

## 2024-11-09 RX ADMIN — AMLODIPINE BESYLATE 10 MILLIGRAM(S): 10 TABLET ORAL at 11:48

## 2024-11-09 RX ADMIN — Medication 325 MILLIGRAM(S): at 11:48

## 2024-11-20 DIAGNOSIS — E78.5 HYPERLIPIDEMIA, UNSPECIFIED: ICD-10-CM

## 2024-11-20 DIAGNOSIS — R55 SYNCOPE AND COLLAPSE: ICD-10-CM

## 2024-11-20 DIAGNOSIS — R00.2 PALPITATIONS: ICD-10-CM

## 2024-11-20 DIAGNOSIS — R07.9 CHEST PAIN, UNSPECIFIED: ICD-10-CM

## 2024-11-20 DIAGNOSIS — R42 DIZZINESS AND GIDDINESS: ICD-10-CM

## 2024-11-20 DIAGNOSIS — K29.70 GASTRITIS, UNSPECIFIED, WITHOUT BLEEDING: ICD-10-CM

## 2024-11-20 DIAGNOSIS — I25.10 ATHEROSCLEROTIC HEART DISEASE OF NATIVE CORONARY ARTERY WITHOUT ANGINA PECTORIS: ICD-10-CM

## 2024-11-20 DIAGNOSIS — I10 ESSENTIAL (PRIMARY) HYPERTENSION: ICD-10-CM

## 2024-11-25 NOTE — ED ADULT NURSE NOTE - NSICDXFAMILYHX_GEN_ALL_CORE_FT
Physical Therapy Visit    Visit Type: Daily Treatment Note  Visit: 7  Referring Provider: Bhargavi Wong MD  Medical Diagnosis (from order): M25.562 - Left knee pain, unspecified chronicity     SUBJECTIVE                                                                                                               Knee has been achy in the last week. Exercises we have been doing it has been bothering it. Lunging exercise and the stepping forward onto the ground.     Pain / Symptoms  - Pain rating (out of 10): Current: 0       OBJECTIVE                                                                                                                                       Treatment     Therapeutic Exercise  Eliptical x 5 mins -subjective taken during exercise  Adductor pulls  blue resistance at knee 1 x 10  Bridge on elevated bench 1 x 5, trialed single leg but cause pain in knee   Single leg bridge 1 x 10 - added to home exercise program  Single leg squat to surface 1 x 3, cause knee discomfort and needed to discontinue  20 lbs kettle bell squats 1 x 10 - added to home exercise program  RDL with leg on bench 15 lbs kettle bell 1 x 6, 1 x 10   Leg press 1 x 10 single leg with 60 lbs, cause knee pain, adjusted to bilateral 120 lbs 1 x 10 - instructed to use at gym     Advised to try hamstring curl machine at gym     Neuromuscular Re-Education  Single leg balance on bosu 1 x 10 seconds, shakey leg with stance on bosu  Double leg anterior to posterior weight shifts on bosu 1 x 10    Skilled input: verbal instruction/cues and tactile instruction/cues    Writer verbally educated and received verbal consent for hand placement, positioning of patient, and techniques to be performed today from patient for therapist position for techniques and hand placement and palpation for techniques as described above and how they are pertinent to the patient's plan of care.  Home Exercise Program  Access Code: [email or web address]  URL:  [email or web address]  Date: 11/11/2024  Prepared by: Yumiko Hill    Exercises  - Standing Hip Abduction with Bent Knee  - 1 x daily - 7 x weekly - 3 sets - 1 reps - 30 seconds hold  - Standing Hamstring Stretch with Step  - 2 x daily - 7 x weekly - 1 sets - 3 reps - 20 seconds hold  - Sit to Stand Without Arm Support  - 1 x daily - 7 x weekly - 3 sets - 10-12 reps  - Standing Knee Flexion with Counter Support  - 1 x daily - 7 x weekly - 3 sets - 10 reps  - Forward Step Down Touch with Heel  - 1 x daily - 7 x weekly - 2 sets - 8 reps  - Sidelying Quadriceps Stretch with Strap  - 1 x daily - 7 x weekly - 3 sets - 1 reps - 15-20 seconds hold  - Single Leg Balance with Ball Toss  - 1 x daily - 7 x weekly - 2 sets - 1 reps - 20-30 seconds hold  - Supine Double Leg Bridge on Box  - 1 x daily - 7 x weekly - 3 sets - 10 reps  - Standing Hip Adduction with Anchored Resistance  - 1 x daily - 7 x weekly - 2 sets - 15 reps  - Lunge with Counter Support  - 1 x daily - 7 x weekly - 3 sets - 8 reps      ASSESSMENT                                                                                                            Patient continues to improve in strength as able to tolerate progression today. He was advised on use of hamstring curl machine at his health/fitness club to facilitate strength gains. Balance/proprioception would benefit from continued work as noted with single leg balance on uneven surface during today's session. Patient to follow up in two weeks to reassess exercises and progress as appropriate. Discussed with patient and agreed to continue therapy until follow up with referring provider next month.   Education:   - Results of above outlined education: Verbalizes understanding and Demonstrates understanding    PLAN                                                                                                                           Suggestions for next session as indicated: Progress  per plan of care, progress squat with kettle bell, add single leg RDL to HEP, trial wall sit, SLB on airex.        Therapist performed and billed unit(s) of today's treatment.  Care approved by and performed under the direction of on-site therapist. Student’s note read and approved.  Yumiko Hill, PT        Therapy procedure time and total treatment time can be found documented on the Time Entry flowsheet     FAMILY HISTORY:  No pertinent family history in first degree relatives

## 2024-12-12 NOTE — ED ADULT NURSE NOTE - NS PRO AD NO ADVANCE DIRECTIVE
Pt is a 63 yo male who presents to the ED with a cc of abdominal pain. PMHx of CAD s/p CABG s/p stent, HTN, HLD. Patient states that beginning November 5 of  he developed a mild left lower quadrant abdominal discomfort.  Patient went to his gastroenterologist who felt that the pain was due to constipation and was placed on Linzess.  As result of continued low-level discomfort without associated symptoms patient was started on Augmentin on the 20th of this month with no improvement of symptomatology. Patient was then seen in the ED on 11/22  and pt was diagnosed with diverticulitis at that time. He was treated with a course of Cipro/Flagyl. 4-5 days later the pain returned. He spoke with his GI and he was started back on Flagyl and Cefpodoxime he began this on Tuesday. He followed up with gastroenterology today for continued pain and pt was sent to the ED for further work up and elv. Denies documented fevers but has had cold chills and sweats. Reports nausea with vomiting with loose stool. Denies CP, SOB. Pt also notes congestion and cough but his daughters work at a  with similar symptoms.         No 61 yo male with PMHx of CAD s/p CABG s/p stent, HTN, HLD who presents to the ED with a cc of abdominal pain. Patient states that beginning November 5 of  he developed a mild left lower quadrant abdominal discomfort.  Patient went to his gastroenterologist who felt that the pain was due to constipation and was placed on Linzess.  As result of continued low-level discomfort without associated symptoms patient was started on Augmentin on the 20th of this month with no improvement of symptomatology. Patient was then seen in the ED on 11/22  and pt was diagnosed with diverticulitis at that time. He was treated with a course of Cipro/Flagyl. 4-5 days later the pain returned. He spoke with his GI and he was started back on Flagyl and Cefpodoxime he began this on Tuesday. He followed up with gastroenterology today for continued pain and pt was sent to the ED for further work up. Denies documented fevers but has had cold chills and sweats. Reports nausea with vomiting with loose stool. Weight loss of 8lbs since 11/5. Denies CP, SOB. Pt also notes congestion and cough but his daughters work at a  with similar symptoms.    ED course  Vitals: T 98.7, HR 98, /96, RR 16, SpO2 98% on RA  Significant labs: WBC 7.53, lactate 1.3, Cr 1.1, lipase 54  Imaging:   CT abdomen and pelvis with IV contrast shows Acute uncomplicated diverticulitis of the distal descending colon, new   compared to 11/22/2024. Minimal residual wall thickening and inflammation surrounding the proximal sigmoid colon, consistent with resolving sigmoid diverticulitis.  EKG: - EKG on admission NSR 95bpm; QTc 454 ms   In ED patient given: Zofran 4mg IVP x1, 2.3L NS bolus x1, Zosyn x1

## 2025-02-19 ENCOUNTER — EMERGENCY (EMERGENCY)
Facility: HOSPITAL | Age: 50
LOS: 0 days | Discharge: HOME | End: 2025-02-19
Attending: EMERGENCY MEDICINE
Payer: MEDICAID

## 2025-02-19 VITALS
RESPIRATION RATE: 19 BRPM | DIASTOLIC BLOOD PRESSURE: 102 MMHG | OXYGEN SATURATION: 98 % | WEIGHT: 147.05 LBS | HEIGHT: 64 IN | HEART RATE: 88 BPM | TEMPERATURE: 98 F | SYSTOLIC BLOOD PRESSURE: 165 MMHG

## 2025-02-19 VITALS
HEART RATE: 75 BPM | RESPIRATION RATE: 18 BRPM | OXYGEN SATURATION: 97 % | DIASTOLIC BLOOD PRESSURE: 90 MMHG | TEMPERATURE: 98 F | SYSTOLIC BLOOD PRESSURE: 122 MMHG

## 2025-02-19 DIAGNOSIS — Z98.890 OTHER SPECIFIED POSTPROCEDURAL STATES: Chronic | ICD-10-CM

## 2025-02-19 DIAGNOSIS — I10 ESSENTIAL (PRIMARY) HYPERTENSION: ICD-10-CM

## 2025-02-19 DIAGNOSIS — R07.89 OTHER CHEST PAIN: ICD-10-CM

## 2025-02-19 DIAGNOSIS — E78.00 PURE HYPERCHOLESTEROLEMIA, UNSPECIFIED: ICD-10-CM

## 2025-02-19 LAB
ALBUMIN SERPL ELPH-MCNC: 3.6 G/DL — SIGNIFICANT CHANGE UP (ref 3.3–5)
ALP SERPL-CCNC: 85 U/L — SIGNIFICANT CHANGE UP (ref 40–120)
ALT FLD-CCNC: 63 U/L — SIGNIFICANT CHANGE UP (ref 12–78)
ANION GAP SERPL CALC-SCNC: 4 MMOL/L — LOW (ref 5–17)
AST SERPL-CCNC: 38 U/L — HIGH (ref 15–37)
BASOPHILS # BLD AUTO: 0.06 K/UL — SIGNIFICANT CHANGE UP (ref 0–0.2)
BASOPHILS NFR BLD AUTO: 0.7 % — SIGNIFICANT CHANGE UP (ref 0–2)
BILIRUB SERPL-MCNC: 0.3 MG/DL — SIGNIFICANT CHANGE UP (ref 0.2–1.2)
BUN SERPL-MCNC: 23 MG/DL — SIGNIFICANT CHANGE UP (ref 7–23)
CALCIUM SERPL-MCNC: 8.7 MG/DL — SIGNIFICANT CHANGE UP (ref 8.5–10.1)
CHLORIDE SERPL-SCNC: 107 MMOL/L — SIGNIFICANT CHANGE UP (ref 96–108)
CO2 SERPL-SCNC: 26 MMOL/L — SIGNIFICANT CHANGE UP (ref 22–31)
CREAT SERPL-MCNC: 1.15 MG/DL — SIGNIFICANT CHANGE UP (ref 0.5–1.3)
EGFR: 78 ML/MIN/1.73M2 — SIGNIFICANT CHANGE UP
EOSINOPHIL # BLD AUTO: 0.99 K/UL — HIGH (ref 0–0.5)
EOSINOPHIL NFR BLD AUTO: 11.8 % — HIGH (ref 0–6)
GLUCOSE SERPL-MCNC: 132 MG/DL — HIGH (ref 70–99)
HCT VFR BLD CALC: 44.2 % — SIGNIFICANT CHANGE UP (ref 39–50)
HGB BLD-MCNC: 15.6 G/DL — SIGNIFICANT CHANGE UP (ref 13–17)
IMM GRANULOCYTES # BLD AUTO: 0.01 K/UL — SIGNIFICANT CHANGE UP (ref 0–0.07)
IMM GRANULOCYTES NFR BLD AUTO: 0.1 % — SIGNIFICANT CHANGE UP (ref 0–0.9)
LIDOCAIN IGE QN: 38 U/L — SIGNIFICANT CHANGE UP (ref 13–75)
LYMPHOCYTES # BLD AUTO: 3.45 K/UL — HIGH (ref 1–3.3)
LYMPHOCYTES NFR BLD AUTO: 41.2 % — SIGNIFICANT CHANGE UP (ref 13–44)
MCHC RBC-ENTMCNC: 30.7 PG — SIGNIFICANT CHANGE UP (ref 27–34)
MCHC RBC-ENTMCNC: 35.3 G/DL — SIGNIFICANT CHANGE UP (ref 32–36)
MCV RBC AUTO: 87 FL — SIGNIFICANT CHANGE UP (ref 80–100)
MONOCYTES # BLD AUTO: 0.51 K/UL — SIGNIFICANT CHANGE UP (ref 0–0.9)
MONOCYTES NFR BLD AUTO: 6.1 % — SIGNIFICANT CHANGE UP (ref 2–14)
NEUTROPHILS # BLD AUTO: 3.35 K/UL — SIGNIFICANT CHANGE UP (ref 1.8–7.4)
NEUTROPHILS NFR BLD AUTO: 40.1 % — LOW (ref 43–77)
NRBC # BLD AUTO: 0 K/UL — SIGNIFICANT CHANGE UP (ref 0–0)
NRBC # FLD: 0 K/UL — SIGNIFICANT CHANGE UP (ref 0–0)
NRBC BLD AUTO-RTO: 0 /100 WBCS — SIGNIFICANT CHANGE UP (ref 0–0)
PLATELET # BLD AUTO: 246 K/UL — SIGNIFICANT CHANGE UP (ref 150–400)
PMV BLD: 9.7 FL — SIGNIFICANT CHANGE UP (ref 7–13)
POTASSIUM SERPL-MCNC: 3.5 MMOL/L — SIGNIFICANT CHANGE UP (ref 3.5–5.3)
POTASSIUM SERPL-SCNC: 3.5 MMOL/L — SIGNIFICANT CHANGE UP (ref 3.5–5.3)
PROT SERPL-MCNC: 6.6 GM/DL — SIGNIFICANT CHANGE UP (ref 6–8.3)
RBC # BLD: 5.08 M/UL — SIGNIFICANT CHANGE UP (ref 4.2–5.8)
RBC # FLD: 11.7 % — SIGNIFICANT CHANGE UP (ref 10.3–14.5)
SODIUM SERPL-SCNC: 137 MMOL/L — SIGNIFICANT CHANGE UP (ref 135–145)
TROPONIN I, HIGH SENSITIVITY RESULT: 11.7 NG/L — SIGNIFICANT CHANGE UP
TROPONIN I, HIGH SENSITIVITY RESULT: 7.16 NG/L — SIGNIFICANT CHANGE UP
WBC # BLD: 8.37 K/UL — SIGNIFICANT CHANGE UP (ref 3.8–10.5)
WBC # FLD AUTO: 8.37 K/UL — SIGNIFICANT CHANGE UP (ref 3.8–10.5)

## 2025-02-19 PROCEDURE — 93005 ELECTROCARDIOGRAM TRACING: CPT

## 2025-02-19 PROCEDURE — 36415 COLL VENOUS BLD VENIPUNCTURE: CPT

## 2025-02-19 PROCEDURE — 84484 ASSAY OF TROPONIN QUANT: CPT

## 2025-02-19 PROCEDURE — 99285 EMERGENCY DEPT VISIT HI MDM: CPT

## 2025-02-19 PROCEDURE — 99285 EMERGENCY DEPT VISIT HI MDM: CPT | Mod: 25

## 2025-02-19 PROCEDURE — 93010 ELECTROCARDIOGRAM REPORT: CPT

## 2025-02-19 PROCEDURE — 80053 COMPREHEN METABOLIC PANEL: CPT

## 2025-02-19 PROCEDURE — 85025 COMPLETE CBC W/AUTO DIFF WBC: CPT

## 2025-02-19 PROCEDURE — 71045 X-RAY EXAM CHEST 1 VIEW: CPT

## 2025-02-19 PROCEDURE — 36000 PLACE NEEDLE IN VEIN: CPT

## 2025-02-19 PROCEDURE — 71045 X-RAY EXAM CHEST 1 VIEW: CPT | Mod: 26

## 2025-02-19 PROCEDURE — 83690 ASSAY OF LIPASE: CPT

## 2025-02-19 NOTE — ED PROVIDER NOTE - OBJECTIVE STATEMENT
49-year-old male history of hypertension high cholesterol presents the emergency department for left-sided chest pressure.  Patient states symptoms has been present for years and has the pain every day typically constant states that he has been having constant left-sided chest pressure but last night at 10 PM symptoms became slightly worse went to sleep woke up at 2 AM and had persistent symptoms so came in for evaluation.  No associated nausea sweatiness lightheadedness dizziness abdominal pain.  States that he was admitted to the hospital in November 2020 for for the same symptoms had serial troponins and echo which were negative and was discharged with follow-up.  Patient states that he has stress test scheduled for tomorrow.  Exam here is nonfocal patient is well-appearing nontoxic normal peripheral pulses no lower extremity swelling.  Patient with constant chest pain starting more than 3 hours ago.  Patient's history is low suspicion for ACS.  Patient states symptoms worsen when he has a lot of anxiety and stress at home and that is the case now.  Heart score is 2 will send labs EKG chest x-ray if workup is negative will DC so patient can have stress test as scheduled

## 2025-02-19 NOTE — ED ADULT NURSE NOTE - OBJECTIVE STATEMENT
Pt endorses chest pain and chest tightness since last night. Pt endorses chest pain radiating down is left arm, burning sensation in his chest and chest pressure that has now resolved. Pt states he took 4 aspirin on his way here. Pt states he has a appt with his cardiologist tomorrow. Pt report recent increase of stress in his life. Pt denies trauma, or injury, nausea, vomiting, fever , chills, and urinary symptoms at this time.

## 2025-02-19 NOTE — ED ADULT NURSE NOTE - NSFALLHARMRISKINTERV_ED_ALL_ED

## 2025-02-19 NOTE — ED PROVIDER NOTE - PATIENT PORTAL LINK FT
You can access the FollowMyHealth Patient Portal offered by [facility] by registering at the following website: [email or web address] By joining [employer]’s FollowMyHealth portal, you will also be able to view your health information using other applications (apps) compatible with our system.

## 2025-02-19 NOTE — ED ADULT TRIAGE NOTE - CHIEF COMPLAINT QUOTE
pt BIBEMS from home with c/o chest pain radiating to left arm PTA, c/o chest tightness, hx of HTN, denies any SOB/Dizziness, EKG in progress

## 2025-02-19 NOTE — ED PROVIDER NOTE - PHYSICAL EXAMINATION
Constitutional: NAD AAOx3  Eyes: PERRLA EOMI  Head: Normocephalic atraumatic  Mouth: MMM  Cardiac: regular rate normal pulses no LE swelling  Resp: unlabored breathing clear b/l   GI: Abd s/nt/nd  Neuro: grossly normal and intact  Skin: No visible rashes

## 2025-02-19 NOTE — ED PROVIDER NOTE - CLINICAL SUMMARY MEDICAL DECISION MAKING FREE TEXT BOX
49-year-old male history of hypertension high cholesterol presents the emergency department for left-sided chest pressure.  Patient states symptoms has been present for years and has the pain every day typically constant states that he has been having constant left-sided chest pressure but last night at 10 PM symptoms became slightly worse went to sleep woke up at 2 AM and had persistent symptoms so came in for evaluation.  No associated nausea sweatiness lightheadedness dizziness abdominal pain.  States that he was admitted to the hospital in November 2020 for for the same symptoms had serial troponins and echo which were negative and was discharged with follow-up.  Patient states that he has stress test scheduled for tomorrow.  Exam here is nonfocal patient is well-appearing nontoxic normal peripheral pulses no lower extremity swelling.  Patient with constant chest pain starting more than 3 hours ago.  Patient's history is low suspicion for ACS.  Patient states symptoms worsen when he has a lot of anxiety and stress at home and that is the case now.  Heart score is 2 will send labs EKG chest x-ray if workup is negative will DC so patient can have stress test as scheduled 49-year-old male history of hypertension high cholesterol presents the emergency department for left-sided chest pressure.  Patient states symptoms has been present for years and has the pain every day typically constant states that he has been having constant left-sided chest pressure but last night at 10 PM symptoms became slightly worse went to sleep woke up at 2 AM and had persistent symptoms so came in for evaluation.  No associated nausea sweatiness lightheadedness dizziness abdominal pain.  States that he was admitted to the hospital in November 2020 for for the same symptoms had serial troponins and echo which were negative and was discharged with follow-up.  Patient states that he has stress test scheduled for tomorrow.  Exam here is nonfocal patient is well-appearing nontoxic normal peripheral pulses no lower extremity swelling.  Patient with constant chest pain starting more than 3 hours ago.  Patient's history is low suspicion for ACS.  Patient states symptoms worsen when he has a lot of anxiety and stress at home and that is the case now.  Heart score is 2 will send labs EKG chest x-ray if workup is negative will DC so patient can have stress test as scheduled  401All labs imaging reviewed by myself.  Labs unremarkable troponin negative x 2 chest x-ray independently interpreted by myself and is negative.  Patient is feeling better no pain at this time no telemetry events.  Patient has follow-up stress test tomorrow heart score is 2 will DC with follow-up and strict return precautions.

## 2025-02-19 NOTE — ED PROVIDER NOTE - NSFOLLOWUPINSTRUCTIONS_ED_ALL_ED_FT
1. return for worsening symptoms or anything concerning to you  2. take all home meds as prescribed  3. follow up with your pmd call to make an appointment  4. If you cannot secure follow up with your PMD or specialist within 24 hours and you have non-emergent questions or concerns please call the [facility] ([phone]) in order to speak with an emergency physician open 24 hours/day, 7 days/week.  5. follow up with your cardiologist    Chest Pain    WHAT YOU NEED TO KNOW:    Chest pain can be caused by a range of conditions, from not serious to life-threatening. Chest pain can be a symptom of a digestive problem, such as acid reflux or a stomach ulcer. An anxiety attack or a strong emotion, such as anger, can also cause chest pain. Infection, inflammation, or a fracture in the bones or cartilage in your chest can cause pain or discomfort. Sometimes chest pain or pressure is caused by poor blood flow to your heart (angina). Chest pain may also be caused by life-threatening conditions such as a heart attack or blood clot in your lungs.     DISCHARGE INSTRUCTIONS:    Call 911 if:     You have any of the following signs of a heart attack:   Squeezing, pressure, or pain in your chest       and any of the following:   Discomfort or pain in your back, neck, jaw, stomach, or arm       Shortness of breath      Nausea or vomiting      Lightheadedness or a sudden cold sweat        Return to the emergency department if:     You have chest discomfort that gets worse, even with medicine.      You cough or vomit blood.       Your bowel movements are black or bloody.       You cannot stop vomiting, or it hurts to swallow.     Contact your healthcare provider if:     You have questions or concerns about your condition or care.        Medicines:     Medicines may be given to treat the cause of your chest pain. Examples include pain medicine, anxiety medicine, or medicines to increase blood flow to your heart.       Do not take certain medicines without asking your healthcare provider first. These include NSAIDs, herbal or vitamin supplements, or hormones (estrogen or progestin).       Take your medicine as directed. Contact your healthcare provider if you think your medicine is not helping or if you have side effects. Tell him or her if you are allergic to any medicine. Keep a list of the medicines, vitamins, and herbs you take. Include the amounts, and when and why you take them. Bring the list or the pill bottles to follow-up visits. Carry your medicine list with you in case of an emergency.    Follow up with your healthcare provider within 72 hours, or as directed: You may need to return for more tests to find the cause of your chest pain. You may be referred to a specialist, such as a cardiologist or gastroenterologist. Write down your questions so you remember to ask them during your visits.     Healthy living tips: The following are general healthy guidelines. If your chest pain is caused by a heart problem, your healthcare provider will give you specific guidelines to follow.    Do not smoke. Nicotine and other chemicals in cigarettes and cigars can cause lung and heart damage. Ask your healthcare provider for information if you currently smoke and need help to quit. E-cigarettes or smokeless tobacco still contain nicotine. Talk to your healthcare provider before you use these products.       Eat a variety of healthy, low-fat, low-salt foods. Healthy foods include fruits, vegetables, whole-grain breads, low-fat dairy products, beans, lean meats, and fish. Ask for more information about a heart healthy diet.      Drink plenty of water every day. Your body is made of mostly water. Water helps your body to control your temperature and blood pressure. Ask your healthcare provider how much water you should drink every day.      Ask about activity. Your healthcare provider will tell you which activities to limit or avoid. Ask when you can drive, return to work, and have sex. Ask about the best exercise plan for you.      Maintain a healthy weight. Ask your healthcare provider how much you should weigh. Ask him or her to help you create a weight loss plan if you are overweight.       Get the flu and pneumonia vaccines. All adults should get the influenza (flu) vaccine. Get it every year as soon as it becomes available. The pneumococcal vaccine is given to adults aged 65 years or older. The vaccine is given every 5 years to prevent pneumococcal disease, such as pneumonia.    If you have a stent:     Carry your stent card with you at all times.       Let all healthcare providers know that you have a stent.

## 2025-02-20 ENCOUNTER — NON-APPOINTMENT (OUTPATIENT)
Age: 50
End: 2025-02-20

## 2025-02-20 ENCOUNTER — APPOINTMENT (OUTPATIENT)
Dept: CARDIOLOGY | Facility: HOSPITAL | Age: 50
End: 2025-02-20

## 2025-02-20 ENCOUNTER — OUTPATIENT (OUTPATIENT)
Dept: OUTPATIENT SERVICES | Facility: HOSPITAL | Age: 50
LOS: 1 days | End: 2025-02-20
Payer: SELF-PAY

## 2025-02-20 VITALS
WEIGHT: 150 LBS | HEART RATE: 92 BPM | DIASTOLIC BLOOD PRESSURE: 107 MMHG | OXYGEN SATURATION: 98 % | SYSTOLIC BLOOD PRESSURE: 177 MMHG | BODY MASS INDEX: 24.96 KG/M2

## 2025-02-20 DIAGNOSIS — I10 ESSENTIAL (PRIMARY) HYPERTENSION: ICD-10-CM

## 2025-02-20 DIAGNOSIS — I25.10 ATHEROSCLEROTIC HEART DISEASE OF NATIVE CORONARY ARTERY WITHOUT ANGINA PECTORIS: ICD-10-CM

## 2025-02-20 DIAGNOSIS — Z98.890 OTHER SPECIFIED POSTPROCEDURAL STATES: Chronic | ICD-10-CM

## 2025-02-20 DIAGNOSIS — F41.8 OTHER SPECIFIED ANXIETY DISORDERS: ICD-10-CM

## 2025-02-20 DIAGNOSIS — R07.89 OTHER CHEST PAIN: ICD-10-CM

## 2025-02-20 PROCEDURE — 93005 ELECTROCARDIOGRAM TRACING: CPT

## 2025-02-20 PROCEDURE — G0463: CPT

## 2025-02-20 RX ORDER — ESCITALOPRAM OXALATE 5 MG/1
5 TABLET ORAL DAILY
Refills: 0 | Status: ACTIVE | COMMUNITY
Start: 2025-02-20

## 2025-02-20 RX ORDER — ATORVASTATIN CALCIUM 10 MG/1
10 TABLET, FILM COATED ORAL DAILY
Qty: 90 | Refills: 0 | Status: ACTIVE | COMMUNITY
Start: 2025-02-20 | End: 1900-01-01

## 2025-02-20 RX ORDER — KRILL/OM-3/DHA/EPA/PHOSPHO/AST 1000-230MG
81 CAPSULE ORAL DAILY
Qty: 90 | Refills: 0 | Status: ACTIVE | COMMUNITY
Start: 2025-02-20

## 2025-02-20 RX ORDER — CHLORTHALIDONE 25 MG/1
25 TABLET ORAL
Qty: 90 | Refills: 1 | Status: ACTIVE | COMMUNITY
Start: 2025-02-20 | End: 1900-01-01

## 2025-02-20 RX ORDER — LOSARTAN POTASSIUM 100 MG/1
100 TABLET, FILM COATED ORAL
Qty: 90 | Refills: 3 | Status: ACTIVE | COMMUNITY
Start: 2025-02-20 | End: 1900-01-01

## 2025-02-25 VITALS — SYSTOLIC BLOOD PRESSURE: 155 MMHG | DIASTOLIC BLOOD PRESSURE: 84 MMHG

## 2025-02-26 NOTE — ED ADULT NURSE NOTE - PAIN: PRESENCE, MLM
Mom cancelled triage. States that medications were already transferred to [facility]. Encounter routed to provider.     Reason for Disposition   [1] Follow-up call to recent contact AND [2] information only call, no triage required    Protocols used: Information Only Call - No Triage-P-AH     complains of pain/discomfort

## 2025-02-27 PROBLEM — I10 HYPERTENSION: Status: ACTIVE | Noted: 2025-02-27

## 2025-02-27 NOTE — END OF VISIT
[] : Fellow [FreeTextEntry3] : Mr. Taylor is a 48 yo M coming for episodes of palpitations, chest tightness and uncontrolled HTN. It is likely related to anxiety/?panic attack. Will exclude other causes as above described by Dr. Rosado.

## 2025-02-27 NOTE — REVIEW OF SYSTEMS
Pt REQUEST TO RESC 2/21 APPOINTMENT. CALLED TO RESCHEDULE LVM    HUB OK TO RESCHEDULE    [SOB] : shortness of breath [Chest Discomfort] : chest discomfort [Palpitations] : palpitations [Negative] : Heme/Lymph [Dyspnea on exertion] : not dyspnea during exertion [Lower Ext Edema] : no extremity edema [Leg Claudication] : no intermittent leg claudication [Orthopnea] : no orthopnea [PND] : no PND [Syncope] : no syncope

## 2025-02-27 NOTE — HISTORY OF PRESENT ILLNESS
[FreeTextEntry1] : 49 years old M w/ pmh of HTN and BPH who presents for paroxysmal symptoms of of chest tightness associated with palpitations and occasionally nausea/vomiting.   Chest pain radiating to the back and shoulders associated with stress. No associated with exertion. The patient notes tightness and pain occur at rest when he becomes anxious and starts to think about life stressors. He notes he is very active at work without any chest pain or SOB monitoring projects. He owns his own business and despite being very active this does not precipitate his symptoms which appear to be sporadic. When measured his blood pressure is lower in the am 130s/ and higher in the evening 200s. He notes these symptoms are during hypertensive spills.   PMH: HTN, BPH PSH: Shoulder surgery dislocation  Meds: Amlodipine 10mg, Losartan 100mg, ASA 81mgm, Escitalopram 5mg qdaily, Atorvastatin 10mg   SH: Rare EtOH use, No recreational drugs or tobacco use

## 2025-02-27 NOTE — END OF VISIT
[] : Fellow [FreeTextEntry3] : Mr. Taylor is a 50 yo M coming for episodes of palpitations, chest tightness and uncontrolled HTN. It is likely related to anxiety/?panic attack. Will exclude other causes as above described by Dr. Rosado.

## 2025-02-27 NOTE — ASSESSMENT
[FreeTextEntry1] : 49 years old M w/ pmh of HTN and BPH who presents for paroxysmal symptoms of of chest tightness associated with palpitations and occasionally nausea/vomiting found to have uncontrolled hypertension.   Impression: Patient notes worsening SBP changes in the evening and has these paroxysmal episodes with normal to mildly elevated blood pressure in the AM. My suspicion is essential hypertension with acute worsening iso of anxiety with panic attacks. However, physiology causes need to be excluded and are plausible. DDX includes Essential hypertension vs. Anxiety vs. Pheochromocytoma vs. Conn syndrome vs. ARA (less likely low KARLA) vs. ANGE vs. Thyroid or parathyroid disease. Although coronary disease cannot be excluded I would control HTN and assess    1. Hypertension  2. Chest tightness  3. Depression with anxiety   Recommendations:  1. Ambulatory 24 hour BP monitoring  2. US duple of renal arteries  3. Check PTH, TSH/T4, Plasma metanephrine random, Renin/Aldosterone, albumin/creatinine  4. Risk stratification with lipid panel, lpA, a1c CMP  5. c/w amlodipine 10mg, losartan 100mg  6. Add chlorthalidone 25mg qdaily  7. Antidepressants per escitalopram  8. C/w Atorvastatin 10 mg, will titrate to lipid profile   Case reviewed with Attending Dr. Choi  RTC in 1 week for labs only then in 2 months for follow up.   Ellis Rosado MD  Cardiology Fellow

## 2025-02-28 DIAGNOSIS — I10 ESSENTIAL (PRIMARY) HYPERTENSION: ICD-10-CM

## 2025-02-28 DIAGNOSIS — F41.8 OTHER SPECIFIED ANXIETY DISORDERS: ICD-10-CM

## 2025-02-28 DIAGNOSIS — R07.89 OTHER CHEST PAIN: ICD-10-CM

## 2025-02-28 NOTE — ED ADULT TRIAGE NOTE - BMI (KG/M2)
[facility] Medicine  Progress Note    Patient Name: Juan Carlos Yoo Sr.  MRN: [id number]  Patient Class: IP- Inpatient   Admission Date: 2/21/2025  Length of Stay: 6 days  Attending Physician: Joao Villegas III, MD  Primary Care Provider: Joao Villegas III, MD        Subjective     Principal Problem:Rhinovirus infection        HPI:  ED HPI:  Juan Carlos Yoo Sr. is a 71 y.o. male with PMHX of alcoholic cirrhosis, HCC, thrombocytopenia, Afib (not on anticoagulation due to thrombocytopenia), ESRD on HD M/W/F who presents to the emergency department C/O SOB.     Patient was recently  admitted to [facility] due to severe anasarca JAE, evaluated for hepatorenal syndrome and had 40 day hospitalization.  Patient released from rehab about a week ago.  Has endorse increasing shortness of breath for the past 3 days.  States at night it is worse when trying to sleep.  Patient lays on his side and uses 3 pillows to prop his head up.  Patient went to urgent care today because he felt like tonight was going to be another bad night and they sent him to ER.  He denies fever.  States swelling is about baseline.  Had typical dialysis session today.  He reports they were trying to take off 3 L but is unsure of how much volume was removed.  He makes urine but states it is difficult to urinate due to anasarca.  He states compliance to low-sodium diet and fluid restriction since recent discharge.  Has not had alcohol in over 7 months.    IM HPI:  Patient's chart reviewed and above history noted.  Patient's been admitted for acute on chronic dyspnea and was diagnosed with rhinovirus.  Patient had been having increasing dyspnea weakness coughing with clear sputum production over the last 36 hours and ultimately presented with worsening shortness of breath.  He has been placed on oxygen nasal cannula states he is feeling much better.  Patient has a mild cough and wheezing on exam.  Patient has a complex  medical history that is has been reviewed he seems slightly hypervolemic we will notify his nephrologist and he will likely need his routine hemodialysis if he ends up staying through Monday.  Patient is afebrile labs noted potassium low we will replete and maybe a good candidate for Aldactone.    Overview/Hospital Course:  2/23 FM:  Patient complaining of a area of redness and pain on the right thigh above the knee.  This started yesterday.  Patient's exam consistent with a area of focal cellulitis and induration.  Ordering ultrasound and starting antibiotics.  Patient's respiratory complaints are much improved and getting back to his baseline.  Patient's nephrologist aware he is in house and may need hemodialysis in a.m..    2/24 DL:  Patient doing well this morning.  He is sitting up in bed in his awake, alert, oriented.  Patient reports respiratory status improved.  Patient now off of supplemental O2.  Patient reports continued tenderness to area of redness to right inner thigh.  Redness and induration have spread past previously marked borders.  Patient is scheduled for ultrasound today.  Continue IV antibiotics.  Patient is scheduled for hemodialysis today.  Nephrology following.    2/25 DL:  Patient doing well this morning.  He is lying in bed in his awake, alert, oriented.  Respiratory status with continued improvement.  Labs and vital signs stable.  Ultrasound right thigh obtained yesterday reveals 12.6 x 6.1 x 3.0 cm complex fluid collection to the medial right thigh.  Venous ultrasound negative for DVT.  General surgery consulted.  Continue current IV antibiotics.  Renal function stable.  Nephrology following.    2/26 DL:  Patient doing well this morning.  He is lying in bed in his awake, alert, oriented.  Respiratory status at baseline.  Vital signs stable.  Patient afebrile without leukocytosis.  Continue current antibiotic treatment for abscess to right thigh.  General surgery consulted.  Appreciate  recs.  Hypokalemia and hypomagnesemia noted this morning.  We will replenish.  Nephrology following.  Appreciate recs.    2/27 DL:  Patient lying in bed resting comfortably.  He has just returned to room from OR for I and D of right inner thigh.  Patient tolerated procedure well.  Patient is afebrile without leukocytosis this morning.  We will continue current IV antibiotic treatment for right thigh abscess pending results of culture obtained per general surgery this morning.  Patient's potassium 2.8 this morning.  Continue p.o. replacement.  We will also give IV replacement today.  Magnesium pending.  Labs and vital signs otherwise stable.  Renal function stable.  Nephrology following.    2/28 DL:  Patient is sitting up on side of bed eating breakfast.  He endorses pain to right inner thigh I&D site that is controlled with current regimen.  Wound cultures with no growth to date x1 day.  Continue current antibiotic treatment.  Continued hypokalemia noted.  Continue p.o. replacement t.i.d.  We we will also give IV replacement today.  Hypomagnesemia also noted.  We will replenish today.  Renal function stable.  Nephrology following.  Labs and vital signs otherwise stable.  Patient with continued improved respiratory status.  Continue discharge planning.    Past Medical History:   Diagnosis Date    Atrial fibrillation     Bulging disc     Cirrhosis     Colon polyp 12/29/2017    Rpt 5 yrs    EV (esophageal varices)     Hypertension 03/30/2023    Renal disorder     Skin cancer 03/2017    Thrombocytopenia        Past Surgical History:   Procedure Laterality Date    CATHETERIZATION OF BOTH LEFT AND RIGHT HEART N/A 2/8/2023    Procedure: CATHETERIZATION, HEART, BOTH LEFT AND RIGHT;  Surgeon: Flo Malone MD;  Location: [facility] CATH LAB;  Service: Cardiology;  Laterality: N/A;  low bleeding risk 1.0%    CHOLECYSTECTOMY      COLONOSCOPY      COLONOSCOPY N/A 12/29/2017    ta rpt 2022    CORONARY ANGIOGRAPHY N/A 2/8/2023     Procedure: ANGIOGRAM, CORONARY ARTERY;  Surgeon: Flo Malone MD;  Location: [facility] CATH LAB;  Service: Cardiology;  Laterality: N/A;    HERNIA REPAIR      SKIN BIOPSY  2017    TONSILLECTOMY      UPPER GASTROINTESTINAL ENDOSCOPY      EV    UPPER GASTROINTESTINAL ENDOSCOPY      VASECTOMY         Review of patient's allergies indicates:  No Known Allergies    No current facility-administered medications on file prior to encounter.     Current Outpatient Medications on File Prior to Encounter   Medication Sig    famotidine (PEPCID) 20 MG tablet TAKE 1 TABLET BY MOUTH EVERY OTHER DAY    furosemide (LASIX) 80 MG tablet Take 80 mg by mouth every Tuesday, Thursday, Saturday, .    lactulose (CHRONULAC) 20 gram/30 mL Soln Take 45 mLs (30 g total) by mouth 3 (three) times daily. TITRATE TO 3-4 BOWEL MOVEMENTS DAILY.    metoprolol tartrate (LOPRESSOR) 25 MG tablet TAKE 0.5 TABLETS BY MOUTH 2 TIMES DAILY.    midodrine (PROAMATINE) 5 MG Tab Take 3 tablets (15 mg total) by mouth every 8 (eight) hours.    potassium chloride (K-TAB) 20 mEq SMARTSI Tablet(s) By Mouth    tamsulosin (FLOMAX) 0.4 mg Cap Take by mouth.    tuberculin,purif.prot.deriv. (TUBERSOL IDRM) Inject 0.1 mLs into the skin.     Family History       Problem Relation (Age of Onset)    Cancer Maternal Uncle    Heart disease Maternal Uncle    Hyperlipidemia Brother    Ovarian cancer Sister          Tobacco Use    Smoking status: Never     Passive exposure: Never    Smokeless tobacco: Never   Substance and Sexual Activity    Alcohol use: Not Currently     Alcohol/week: 0.0 standard drinks of alcohol    Drug use: No    Sexual activity: Not Currently     Review of Systems   Constitutional:  Positive for fatigue. Negative for activity change, appetite change, chills and fever.   HENT:  Negative for ear pain, mouth sores, nosebleeds and sore throat.    Eyes:  Negative for visual disturbance.   Respiratory:  Positive for cough and wheezing. Negative  for chest tightness, shortness of breath and stridor.    Cardiovascular:  Positive for leg swelling. Negative for chest pain and palpitations.   Gastrointestinal:  Negative for abdominal distention, abdominal pain, anal bleeding, blood in stool, constipation, diarrhea, nausea, rectal pain and vomiting.   Endocrine: Negative for polyphagia.   Genitourinary:  Positive for difficulty urinating. Negative for dysuria, flank pain and frequency.   Musculoskeletal:  Positive for gait problem. Negative for myalgias.   Skin:  Positive for color change and rash.   Neurological:  Positive for weakness. Negative for dizziness, tremors, seizures, syncope, facial asymmetry, speech difficulty and headaches.   Hematological:  Negative for adenopathy.   Psychiatric/Behavioral:  Positive for confusion. Negative for agitation and hallucinations. The patient is not nervous/anxious.      Objective:     Vital Signs (Most Recent):  Temp: 98 °F (36.7 °C) (02/28/25 0828)  Pulse: 84 (02/28/25 0828)  Resp: 17 (02/28/25 0828)  BP: 114/71 (02/28/25 0828)  SpO2: (!) 94 % (02/28/25 0828) Vital Signs (24h Range):  Temp:  [97.6 °F (36.4 °C)-98.7 °F (37.1 °C)] 98 °F (36.7 °C)  Pulse:  [82-99] 84  Resp:  [17-20] 17  SpO2:  [91 %-99 %] 94 %  BP: (102-117)/(51-71) 114/71     Weight: 131.5 kg (290 lb)  Body mass index is 38.26 kg/m².     Physical Exam  Vitals and nursing note reviewed.   Constitutional:       General: He is not in acute distress.     Appearance: He is obese. He is not ill-appearing, toxic-appearing or diaphoretic.   HENT:      Head: Normocephalic and atraumatic.      Right Ear: External ear normal.      Left Ear: External ear normal.      Nose: Nose normal. No rhinorrhea.      Mouth/Throat:      Mouth: Mucous membranes are moist.      Pharynx: No oropharyngeal exudate or posterior oropharyngeal erythema.   Eyes:      General: Scleral icterus present.      Extraocular Movements: Extraocular movements intact.   Neck:      Vascular: No  carotid bruit.   Cardiovascular:      Rate and Rhythm: Normal rate and regular rhythm.      Heart sounds: Normal heart sounds. No murmur heard.     No friction rub. No gallop.   Pulmonary:      Effort: Pulmonary effort is normal. No respiratory distress.      Breath sounds: No stridor. Wheezing and rhonchi present. No rales.   Chest:      Chest wall: No tenderness.   Abdominal:      General: There is no distension.      Palpations: Abdomen is soft. There is no mass.      Tenderness: There is no abdominal tenderness. There is no right CVA tenderness, left CVA tenderness, guarding or rebound.      Hernia: No hernia is present.   Musculoskeletal:         General: No swelling, tenderness or deformity.      Cervical back: No rigidity.      Right lower leg: No edema.      Left lower leg: No edema.   Lymphadenopathy:      Cervical: No cervical adenopathy.   Skin:     General: Skin is warm and dry.      Capillary Refill: Capillary refill takes less than 2 seconds.      Coloration: Skin is jaundiced.      Findings: Erythema and rash present.             Comments: 12 x 10 cm area of erythema and fluctuance to medial right thigh s/p I&D this morning.  ACE wrap in place without drainage.   Neurological:      Mental Status: He is alert. Mental status is at baseline.      Cranial Nerves: No cranial nerve deficit.      Sensory: No sensory deficit.      Motor: Weakness present.      Coordination: Coordination normal.   Psychiatric:         Mood and Affect: Mood normal.         Behavior: Behavior normal.         Thought Content: Thought content normal.         Judgment: Judgment normal.                Significant Labs: All pertinent labs within the past 24 hours have been reviewed.  Recent Lab Results         02/28/25  0549        Albumin 2.0       ALP 80       ALT 13       Anion Gap 10       AST 41       Baso # 0.08       Basophil % 1.5       BILIRUBIN TOTAL 3.0  Comment: For infants and newborns, interpretation of results should  be based  on gestational age, weight and in agreement with clinical  observations.    Premature Infant recommended reference ranges:  Up to 24 hours.............<8.0 mg/dL  Up to 48 hours............<12.0 mg/dL  3-5 days..................<15.0 mg/dL  6-29 days.................<15.0 mg/dL         BUN 24       Calcium 8.3       Chloride 91       CO2 33       Creatinine 1.8       Differential Method Automated       eGFR 39.7       Eos # 0.1       Eos % 2.1       Glucose 99       Gran # (ANC) 3.1       Gran % 57.7       Hematocrit 31.4       Hemoglobin 10.3       Immature Grans (Abs) 0.02  Comment: Mild elevation in immature granulocytes is non specific and   can be seen in a variety of conditions including stress response,   acute inflammation, trauma and pregnancy. Correlation with other   laboratory and clinical findings is essential.         Immature Granulocytes 0.4       Lymph # 1.1       Lymph % 20.2       Magnesium  1.4       MCH 29.9       MCHC 32.8       MCV 91       Mono # 1.0       Mono % 18.1       MPV 12.7       nRBC 0       Platelet Count 89       Potassium 2.6  Comment: Potassium critical result(s) called and verbal readback obtained   from Marilyn Abdullahi RN, on [facility] floor by JW3 02/28/2025   07:07         PROTEIN TOTAL 6.6       RBC 3.44       RDW 18.8       Sodium 134       WBC 5.30               Significant Imaging: I have reviewed all pertinent imaging results/findings within the past 24 hours.    Assessment and Plan     * Rhinovirus infection  Conservative care, + now with O2 needs.    2/24:  Continue conservative care.  Symptoms improving.  Patient with stable O2 sats on room air.    2/27:  Stable.  Continue conservative care.      Hypomagnesemia  Patient has Abnormal Magnesium: hypomagnesemia. Will continue to monitor electrolytes closely. Will replace the affected electrolytes and repeat labs to be done after interventions completed. The patient's magnesium results have been reviewed  and are listed below.  Recent Labs   Lab 02/28/25  0549   MG 1.4*          Cellulitis of right lower extremity  2/23 FM:  Starting abx, get US.    2/24:  Increased induration and erythema noted.  Ultrasound pending.  Patient afebrile without leukocytosis.  Continue IV antibiotics.      2/25:  Ultrasound obtained yesterday reveals 12.6 x 6.1 x 3.0 cm complex fluid collection to patient's medial right thigh.  General surgery consulted.  Appreciate recs.  Patient afebrile without leukocytosis.  Continue current IV antibiotic treatment.    2/26:  Patient remains afebrile without leukocytosis.  Continue antibiotic treatment.  General surgery consulted.  Appreciate recs.    2/27:  S/P I&D per general surgery this morning.  Cultures pending.  Continue current IV abx treatment.    2/28:  Afebrile without leukocytosis.  Cultures pending.  Continue current IV antibiotic treatment.    Hypokalemia  Patient's most recent potassium results are listed below.   Recent Labs     02/26/25  0529 02/27/25  0603 02/28/25  0549   K 2.9* 2.8* 2.6*       Plan  - Replete potassium per protocol  - Monitor potassium Daily  - Patient's hypokalemia is stable  - Nephrology following.  Appreciate recs.    Morbid obesity  Body mass index is 38.26 kg/m². Morbid obesity complicates all aspects of disease management from diagnostic modalities to treatment. Weight loss encouraged and health benefits explained to patient.         Moderate protein-calorie malnutrition  Nutrition consulted. Most recent weight and BMI monitored-     Measurements:  Wt Readings from Last 1 Encounters:   02/21/25 131.5 kg (290 lb)   Body mass index is 38.26 kg/m².    Patient has been screened and assessed by RD.    Malnutrition Type:  Context:    Level:      Malnutrition Characteristic Summary:       Interventions/Recommendations (treatment strategy):         Hypotension  Blood pressure stable.  Continue monitoring.      Hepatic encephalopathy  Continue lactulose.      CKD  (chronic kidney disease) stage 4, GFR 15-29 ml/min  Creatine stable for now. BMP reviewed- noted Estimated Creatinine Clearance: 53.5 mL/min (A) (based on SCr of 1.8 mg/dL (H)). according to latest data. Based on current GFR, CKD stage is stage 5 - GFR < 15.  Monitor UOP and serial BMP and adjust therapy as needed. Renally dose meds. Avoid nephrotoxic medications and procedures.    Per renal, currently on MWF HD, ? Still needs this?    Suspected Hepatorenal syndrome  Slightly hypervolemic today, much improved from past care.      Longstanding persistent atrial fibrillation  Patient has long standing persistent (>12 months) atrial fibrillation. Patient is currently in atrial fibrillation. HMGGL2SSWo Score: 1. The patients heart rate in the last 24 hours is as follows:  Pulse  Min: 82  Max: 99     Antiarrhythmics  metoprolol tartrate (LOPRESSOR) tablet 25 mg, 2 times daily, Oral    Anticoagulants       Plan  - Replete lytes with a goal of K>4, Mg >2  - Patient is anticoagulated, see medications listed above.  - Patient's afib is currently controlled    Auto anticoagulated with CLD.        Coagulopathy  CLD      Cirrhosis  Patient with known Cirrhosis with Child's class C. Co-morbidities are present and inclusive of portal hypertension, hepatic encephalopathy, malnutrition, anemia/pancytopenia, and anticoagulation.  MELD-Na score calculated; MELD 3.0: 28 at 2/23/2025  5:15 AM  MELD-Na: 26 at 2/23/2025  5:15 AM  Calculated from:  Serum Creatinine: 1.8 mg/dL at 2/23/2025  5:15 AM  Serum Sodium: 133 mmol/L at 2/23/2025  5:15 AM  Total Bilirubin: 3.9 mg/dL at 2/23/2025  5:15 AM  Serum Albumin: 2.1 g/dL at 2/23/2025  5:15 AM  INR(ratio): 1.9 at 2/21/2025  6:42 PM  Age at listing (hypothetical): 71 years  Sex: Male at 2/23/2025  5:15 AM      Continue chronic meds. Etiology likely ETOH. Will avoid any hepatotoxic meds, and monitor CBC/CMP/INR for synthetic function.       VTE Risk Mitigation (From admission, onward)            Ordered     Place sequential compression device  Until discontinued         02/22/25 1002     IP VTE HIGH RISK PATIENT  Once         02/21/25 2204     Place ANDREW hose  Until discontinued         02/21/25 2204                    Discharge Planning   AUTUMN:      Code Status: Full Code   Medical Readiness for Discharge Date:   Discharge Plan A: Home                Please place Justification for DME        Pilar Gates NP  Department of Hospital Medicine   [facility] S[facility]     25.8

## 2025-04-04 ENCOUNTER — EMERGENCY (EMERGENCY)
Facility: HOSPITAL | Age: 50
LOS: 0 days | Discharge: HOME | End: 2025-04-04
Attending: EMERGENCY MEDICINE
Payer: MEDICAID

## 2025-04-04 VITALS
SYSTOLIC BLOOD PRESSURE: 175 MMHG | OXYGEN SATURATION: 100 % | DIASTOLIC BLOOD PRESSURE: 103 MMHG | TEMPERATURE: 98 F | RESPIRATION RATE: 18 BRPM | HEART RATE: 94 BPM

## 2025-04-04 VITALS — WEIGHT: 153.66 LBS | HEIGHT: 64 IN

## 2025-04-04 DIAGNOSIS — R51.9 HEADACHE, UNSPECIFIED: ICD-10-CM

## 2025-04-04 DIAGNOSIS — Z98.890 OTHER SPECIFIED POSTPROCEDURAL STATES: Chronic | ICD-10-CM

## 2025-04-04 DIAGNOSIS — E78.5 HYPERLIPIDEMIA, UNSPECIFIED: ICD-10-CM

## 2025-04-04 DIAGNOSIS — I10 ESSENTIAL (PRIMARY) HYPERTENSION: ICD-10-CM

## 2025-04-04 DIAGNOSIS — R07.9 CHEST PAIN, UNSPECIFIED: ICD-10-CM

## 2025-04-04 LAB
ALBUMIN SERPL ELPH-MCNC: 3.7 G/DL — SIGNIFICANT CHANGE UP (ref 3.3–5)
ALP SERPL-CCNC: 90 U/L — SIGNIFICANT CHANGE UP (ref 40–120)
ALT FLD-CCNC: 44 U/L — SIGNIFICANT CHANGE UP (ref 12–78)
ANION GAP SERPL CALC-SCNC: 6 MMOL/L — SIGNIFICANT CHANGE UP (ref 5–17)
AST SERPL-CCNC: 30 U/L — SIGNIFICANT CHANGE UP (ref 15–37)
BASOPHILS # BLD AUTO: 0.06 K/UL — SIGNIFICANT CHANGE UP (ref 0–0.2)
BASOPHILS NFR BLD AUTO: 0.7 % — SIGNIFICANT CHANGE UP (ref 0–2)
BILIRUB SERPL-MCNC: 0.4 MG/DL — SIGNIFICANT CHANGE UP (ref 0.2–1.2)
BUN SERPL-MCNC: 19 MG/DL — SIGNIFICANT CHANGE UP (ref 7–23)
CALCIUM SERPL-MCNC: 8.9 MG/DL — SIGNIFICANT CHANGE UP (ref 8.5–10.1)
CHLORIDE SERPL-SCNC: 95 MMOL/L — LOW (ref 96–108)
CO2 SERPL-SCNC: 28 MMOL/L — SIGNIFICANT CHANGE UP (ref 22–31)
CREAT SERPL-MCNC: 0.99 MG/DL — SIGNIFICANT CHANGE UP (ref 0.5–1.3)
EGFR: 93 ML/MIN/1.73M2 — SIGNIFICANT CHANGE UP
EGFR: 93 ML/MIN/1.73M2 — SIGNIFICANT CHANGE UP
EOSINOPHIL # BLD AUTO: 0.34 K/UL — SIGNIFICANT CHANGE UP (ref 0–0.5)
EOSINOPHIL NFR BLD AUTO: 4.2 % — SIGNIFICANT CHANGE UP (ref 0–6)
GLUCOSE SERPL-MCNC: 130 MG/DL — HIGH (ref 70–99)
HCT VFR BLD CALC: 44.7 % — SIGNIFICANT CHANGE UP (ref 39–50)
HGB BLD-MCNC: 15.9 G/DL — SIGNIFICANT CHANGE UP (ref 13–17)
IMM GRANULOCYTES # BLD AUTO: 0.01 K/UL — SIGNIFICANT CHANGE UP (ref 0–0.07)
IMM GRANULOCYTES NFR BLD AUTO: 0.1 % — SIGNIFICANT CHANGE UP (ref 0–0.9)
LYMPHOCYTES # BLD AUTO: 1.68 K/UL — SIGNIFICANT CHANGE UP (ref 1–3.3)
LYMPHOCYTES NFR BLD AUTO: 21 % — SIGNIFICANT CHANGE UP (ref 13–44)
MCHC RBC-ENTMCNC: 30.3 PG — SIGNIFICANT CHANGE UP (ref 27–34)
MCHC RBC-ENTMCNC: 35.6 G/DL — SIGNIFICANT CHANGE UP (ref 32–36)
MONOCYTES # BLD AUTO: 0.43 K/UL — SIGNIFICANT CHANGE UP (ref 0–0.9)
MONOCYTES NFR BLD AUTO: 5.4 % — SIGNIFICANT CHANGE UP (ref 2–14)
NRBC # BLD AUTO: 0 K/UL — SIGNIFICANT CHANGE UP (ref 0–0)
NRBC # FLD: 0 K/UL — SIGNIFICANT CHANGE UP (ref 0–0)
NRBC BLD AUTO-RTO: 0 /100 WBCS — SIGNIFICANT CHANGE UP (ref 0–0)
PLATELET # BLD AUTO: 294 K/UL — SIGNIFICANT CHANGE UP (ref 150–400)
PMV BLD: 9.5 FL — SIGNIFICANT CHANGE UP (ref 7–13)
POTASSIUM SERPL-MCNC: 3.6 MMOL/L — SIGNIFICANT CHANGE UP (ref 3.5–5.3)
POTASSIUM SERPL-SCNC: 3.6 MMOL/L — SIGNIFICANT CHANGE UP (ref 3.5–5.3)
PROT SERPL-MCNC: 7.2 GM/DL — SIGNIFICANT CHANGE UP (ref 6–8.3)
RBC # BLD: 5.24 M/UL — SIGNIFICANT CHANGE UP (ref 4.2–5.8)
RBC # FLD: 11.8 % — SIGNIFICANT CHANGE UP (ref 10.3–14.5)
SODIUM SERPL-SCNC: 129 MMOL/L — LOW (ref 135–145)
TROPONIN I, HIGH SENSITIVITY RESULT: 3.79 NG/L — SIGNIFICANT CHANGE UP
WBC # BLD: 8.01 K/UL — SIGNIFICANT CHANGE UP (ref 3.8–10.5)
WBC # FLD AUTO: 8.01 K/UL — SIGNIFICANT CHANGE UP (ref 3.8–10.5)

## 2025-04-04 PROCEDURE — 71045 X-RAY EXAM CHEST 1 VIEW: CPT | Mod: 26

## 2025-04-04 PROCEDURE — 71045 X-RAY EXAM CHEST 1 VIEW: CPT

## 2025-04-04 PROCEDURE — 99285 EMERGENCY DEPT VISIT HI MDM: CPT

## 2025-04-04 PROCEDURE — 36415 COLL VENOUS BLD VENIPUNCTURE: CPT

## 2025-04-04 PROCEDURE — 85025 COMPLETE CBC W/AUTO DIFF WBC: CPT

## 2025-04-04 PROCEDURE — 93010 ELECTROCARDIOGRAM REPORT: CPT

## 2025-04-04 PROCEDURE — 80053 COMPREHEN METABOLIC PANEL: CPT

## 2025-04-04 PROCEDURE — 84484 ASSAY OF TROPONIN QUANT: CPT

## 2025-04-04 PROCEDURE — 99285 EMERGENCY DEPT VISIT HI MDM: CPT | Mod: 25

## 2025-04-04 PROCEDURE — 82962 GLUCOSE BLOOD TEST: CPT

## 2025-04-04 PROCEDURE — 93005 ELECTROCARDIOGRAM TRACING: CPT

## 2025-04-04 PROCEDURE — 96374 THER/PROPH/DIAG INJ IV PUSH: CPT

## 2025-04-04 RX ORDER — ACETAMINOPHEN 500 MG/5ML
1000 LIQUID (ML) ORAL ONCE
Refills: 0 | Status: COMPLETED | OUTPATIENT
Start: 2025-04-04 | End: 2025-04-04

## 2025-04-04 RX ADMIN — Medication 400 MILLIGRAM(S): at 13:27

## 2025-04-04 NOTE — ED ADULT NURSE NOTE - SUICIDE SCREENING QUESTION 2
The patient's goals for the shift include      The clinical goals for the shift include Patient CIWA will improve throughout shift        Patient unable to complete

## 2025-04-04 NOTE — ED ADULT TRIAGE NOTE - CHIEF COMPLAINT QUOTE
Pt presents to ED c/o elevated BP, abdominal pain, nausea, headache and chest pain radiating to back x a month. Pt states "I take my blood pressure medications every day and my blood pressure continues to be high. My vision is also blurry. I have had all of these symptoms for over a month now."

## 2025-04-04 NOTE — ED STATDOCS - CARE PROVIDER_API CALL
Alex Mariano  Cardiovascular Disease  [address], [facility] [address]  [place] [address]  Phone: [phone]  Fax: [phone]  Follow Up Time:

## 2025-04-04 NOTE — ED STATDOCS - PATIENT PORTAL LINK FT
You can access the FollowMyHealth Patient Portal offered by [facility] by registering at the following website: [email or web address]. By joining [facility]’s FollowMyHealth portal, you will also be able to view your health information using other applications (apps) compatible with our system.

## 2025-04-04 NOTE — ED STATDOCS - OBJECTIVE STATEMENT
48 y/o male with a PMHx of HLD, HTN presents to the ED c/o multiple medical complaints. Pt states he has headaches, he feels weak, nauseous, abdominal pain and chest pain. Pt states this has been going on for a while. Pt states when he takes the medications it does not help him. Pt states he went to a cardiologist who prescribed him more medication that did not help.

## 2025-04-04 NOTE — ED ADULT NURSE NOTE - OBJECTIVE STATEMENT
pt presenting with high BP and chest pains, headache, abd pain and nausea for several months. States he takes his meds as prescribed and "needs to make an appt with cardiology" who already sees in [place]

## 2025-04-04 NOTE — ED STATDOCS - ATTENDING APP SHARED VISIT CONTRIBUTION OF CARE
I,Varinder Macias MD,  performed the initial face to face bedside interview with this patient regarding history of present illness, review of symptoms and relevant past medical, social and family history.  I completed an independent physical examination.  I was the initial provider who evaluated this patient. I have signed out the follow up of any pending tests (i.e. labs, radiological studies) to the ACP.  I have communicated the patient’s plan of care and disposition with the ACP.  The history, relevant review of systems, past medical and surgical history, medical decision making, and physical examination was documented by the scribe in my presence and I attest to the accuracy of the documentation.

## 2025-04-04 NOTE — ED ADULT NURSE REASSESSMENT NOTE - NS ED NURSE REASSESS COMMENT FT1
Received pt from [facility]. Pt endorses chest pain. VS as noted, found to be hypertensive. Dr. Macias aware. No further interventions at this time. As per MD, no cardiac monitor or repeat EKG needed.

## 2025-04-04 NOTE — ED STATDOCS - PROGRESS NOTE DETAILS
48 y/o M with PMH of HTN, HLD presents with chest pain, HA for years. Pt admits he was recently admitted for same. Was seen by outpatient cardiology, started on medications for blood pressure. Reports he has not followed up with cardiology since that initial eval. Was recommended to have stress test. Denies fever, chills, cough, nausea, vomiting, dizziness. PE: Well appearing. Cardiac: s1s2, RRR. Lungs: CTAB. Abdomen: NBS x4 soft, nontender. PV: No LE edema, calf tenderness. A/P: Chest pain, will check ekg, labs, cxr, likely dc with outpatient cardiology follow up for stress test. - Grayson Callahan PA-C

## 2025-04-04 NOTE — ED STATDOCS - CLINICAL SUMMARY MEDICAL DECISION MAKING FREE TEXT BOX
48 y/o M presents with headache and CP going on for years. Pt worked up multiple times in ED and followed up with cardiologist. EKG unremarkable. will check labs and recommend follow up with cardiologist.

## 2025-04-15 ENCOUNTER — EMERGENCY (EMERGENCY)
Facility: HOSPITAL | Age: 50
LOS: 0 days | Discharge: HOME | End: 2025-04-16
Attending: EMERGENCY MEDICINE
Payer: MEDICAID

## 2025-04-15 VITALS
RESPIRATION RATE: 18 BRPM | TEMPERATURE: 99 F | HEART RATE: 143 BPM | DIASTOLIC BLOOD PRESSURE: 106 MMHG | HEIGHT: 66 IN | SYSTOLIC BLOOD PRESSURE: 176 MMHG | WEIGHT: 149.03 LBS | OXYGEN SATURATION: 98 %

## 2025-04-15 VITALS
RESPIRATION RATE: 16 BRPM | DIASTOLIC BLOOD PRESSURE: 66 MMHG | TEMPERATURE: 98 F | HEART RATE: 64 BPM | SYSTOLIC BLOOD PRESSURE: 120 MMHG | OXYGEN SATURATION: 98 %

## 2025-04-15 DIAGNOSIS — R00.2 PALPITATIONS: ICD-10-CM

## 2025-04-15 DIAGNOSIS — R07.9 CHEST PAIN, UNSPECIFIED: ICD-10-CM

## 2025-04-15 DIAGNOSIS — Z98.890 OTHER SPECIFIED POSTPROCEDURAL STATES: Chronic | ICD-10-CM

## 2025-04-15 DIAGNOSIS — E78.5 HYPERLIPIDEMIA, UNSPECIFIED: ICD-10-CM

## 2025-04-15 DIAGNOSIS — I10 ESSENTIAL (PRIMARY) HYPERTENSION: ICD-10-CM

## 2025-04-15 DIAGNOSIS — R00.0 TACHYCARDIA, UNSPECIFIED: ICD-10-CM

## 2025-04-15 LAB
ALBUMIN SERPL ELPH-MCNC: 3.9 G/DL — SIGNIFICANT CHANGE UP (ref 3.3–5)
ALP SERPL-CCNC: 79 U/L — SIGNIFICANT CHANGE UP (ref 40–120)
ALT FLD-CCNC: 52 U/L — SIGNIFICANT CHANGE UP (ref 12–78)
ANION GAP SERPL CALC-SCNC: 6 MMOL/L — SIGNIFICANT CHANGE UP (ref 5–17)
ANION GAP SERPL CALC-SCNC: 9 MMOL/L — SIGNIFICANT CHANGE UP (ref 5–17)
APTT BLD: 30.7 SEC — SIGNIFICANT CHANGE UP (ref 24.5–35.6)
AST SERPL-CCNC: 40 U/L — HIGH (ref 15–37)
BASOPHILS # BLD AUTO: 0.06 K/UL — SIGNIFICANT CHANGE UP (ref 0–0.2)
BASOPHILS NFR BLD AUTO: 0.6 % — SIGNIFICANT CHANGE UP (ref 0–2)
BILIRUB SERPL-MCNC: 0.5 MG/DL — SIGNIFICANT CHANGE UP (ref 0.2–1.2)
BUN SERPL-MCNC: 17 MG/DL — SIGNIFICANT CHANGE UP (ref 7–23)
BUN SERPL-MCNC: 22 MG/DL — SIGNIFICANT CHANGE UP (ref 7–23)
CALCIUM SERPL-MCNC: 8.8 MG/DL — SIGNIFICANT CHANGE UP (ref 8.5–10.1)
CALCIUM SERPL-MCNC: 9.6 MG/DL — SIGNIFICANT CHANGE UP (ref 8.5–10.1)
CHLORIDE SERPL-SCNC: 92 MMOL/L — LOW (ref 96–108)
CHLORIDE SERPL-SCNC: 96 MMOL/L — SIGNIFICANT CHANGE UP (ref 96–108)
CO2 SERPL-SCNC: 25 MMOL/L — SIGNIFICANT CHANGE UP (ref 22–31)
CO2 SERPL-SCNC: 27 MMOL/L — SIGNIFICANT CHANGE UP (ref 22–31)
CREAT SERPL-MCNC: 0.91 MG/DL — SIGNIFICANT CHANGE UP (ref 0.5–1.3)
CREAT SERPL-MCNC: 1.15 MG/DL — SIGNIFICANT CHANGE UP (ref 0.5–1.3)
EGFR: 103 ML/MIN/1.73M2 — SIGNIFICANT CHANGE UP
EGFR: 103 ML/MIN/1.73M2 — SIGNIFICANT CHANGE UP
EGFR: 78 ML/MIN/1.73M2 — SIGNIFICANT CHANGE UP
EGFR: 78 ML/MIN/1.73M2 — SIGNIFICANT CHANGE UP
EOSINOPHIL # BLD AUTO: 0.51 K/UL — HIGH (ref 0–0.5)
EOSINOPHIL NFR BLD AUTO: 4.8 % — SIGNIFICANT CHANGE UP (ref 0–6)
GLUCOSE SERPL-MCNC: 107 MG/DL — HIGH (ref 70–99)
GLUCOSE SERPL-MCNC: 151 MG/DL — HIGH (ref 70–99)
HCT VFR BLD CALC: 43.2 % — SIGNIFICANT CHANGE UP (ref 39–50)
HGB BLD-MCNC: 15.6 G/DL — SIGNIFICANT CHANGE UP (ref 13–17)
IMM GRANULOCYTES # BLD AUTO: 0.01 K/UL — SIGNIFICANT CHANGE UP (ref 0–0.07)
IMM GRANULOCYTES NFR BLD AUTO: 0.1 % — SIGNIFICANT CHANGE UP (ref 0–0.9)
INR BLD: 0.93 RATIO — SIGNIFICANT CHANGE UP (ref 0.85–1.16)
LYMPHOCYTES # BLD AUTO: 3.48 K/UL — HIGH (ref 1–3.3)
LYMPHOCYTES NFR BLD AUTO: 32.6 % — SIGNIFICANT CHANGE UP (ref 13–44)
MAGNESIUM SERPL-MCNC: 1.7 MG/DL — SIGNIFICANT CHANGE UP (ref 1.6–2.6)
MCHC RBC-ENTMCNC: 30 PG — SIGNIFICANT CHANGE UP (ref 27–34)
MCHC RBC-ENTMCNC: 36.1 G/DL — HIGH (ref 32–36)
MCV RBC AUTO: 83.1 FL — SIGNIFICANT CHANGE UP (ref 80–100)
MONOCYTES # BLD AUTO: 0.64 K/UL — SIGNIFICANT CHANGE UP (ref 0–0.9)
MONOCYTES NFR BLD AUTO: 6 % — SIGNIFICANT CHANGE UP (ref 2–14)
NEUTROPHILS # BLD AUTO: 5.99 K/UL — SIGNIFICANT CHANGE UP (ref 1.8–7.4)
NEUTROPHILS NFR BLD AUTO: 55.9 % — SIGNIFICANT CHANGE UP (ref 43–77)
NRBC # BLD AUTO: 0 K/UL — SIGNIFICANT CHANGE UP (ref 0–0)
NRBC # FLD: 0 K/UL — SIGNIFICANT CHANGE UP (ref 0–0)
NRBC BLD AUTO-RTO: 0 /100 WBCS — SIGNIFICANT CHANGE UP (ref 0–0)
NT-PROBNP SERPL-SCNC: 9 PG/ML — SIGNIFICANT CHANGE UP (ref 0–125)
PLATELET # BLD AUTO: 294 K/UL — SIGNIFICANT CHANGE UP (ref 150–400)
PMV BLD: 9.6 FL — SIGNIFICANT CHANGE UP (ref 7–13)
POTASSIUM SERPL-MCNC: 2.6 MMOL/L — CRITICAL LOW (ref 3.5–5.3)
POTASSIUM SERPL-MCNC: 3.8 MMOL/L — SIGNIFICANT CHANGE UP (ref 3.5–5.3)
POTASSIUM SERPL-SCNC: 2.6 MMOL/L — CRITICAL LOW (ref 3.5–5.3)
POTASSIUM SERPL-SCNC: 3.8 MMOL/L — SIGNIFICANT CHANGE UP (ref 3.5–5.3)
PROT SERPL-MCNC: 7.4 GM/DL — SIGNIFICANT CHANGE UP (ref 6–8.3)
PROTHROM AB SERPL-ACNC: 11 SEC — SIGNIFICANT CHANGE UP (ref 9.9–13.4)
RBC # BLD: 5.2 M/UL — SIGNIFICANT CHANGE UP (ref 4.2–5.8)
RBC # FLD: 11.8 % — SIGNIFICANT CHANGE UP (ref 10.3–14.5)
SODIUM SERPL-SCNC: 126 MMOL/L — LOW (ref 135–145)
SODIUM SERPL-SCNC: 129 MMOL/L — LOW (ref 135–145)
TROPONIN I, HIGH SENSITIVITY RESULT: 4.5 NG/L — SIGNIFICANT CHANGE UP
TROPONIN I, HIGH SENSITIVITY RESULT: 4.5 NG/L — SIGNIFICANT CHANGE UP
TROPONIN I, HIGH SENSITIVITY RESULT: 8.74 NG/L — SIGNIFICANT CHANGE UP
WBC # BLD: 10.69 K/UL — HIGH (ref 3.8–10.5)
WBC # FLD AUTO: 10.69 K/UL — HIGH (ref 3.8–10.5)

## 2025-04-15 PROCEDURE — 80053 COMPREHEN METABOLIC PANEL: CPT

## 2025-04-15 PROCEDURE — 99285 EMERGENCY DEPT VISIT HI MDM: CPT | Mod: 25

## 2025-04-15 PROCEDURE — 99285 EMERGENCY DEPT VISIT HI MDM: CPT

## 2025-04-15 PROCEDURE — 71045 X-RAY EXAM CHEST 1 VIEW: CPT

## 2025-04-15 PROCEDURE — 85610 PROTHROMBIN TIME: CPT

## 2025-04-15 PROCEDURE — 83880 ASSAY OF NATRIURETIC PEPTIDE: CPT

## 2025-04-15 PROCEDURE — 96376 TX/PRO/DX INJ SAME DRUG ADON: CPT

## 2025-04-15 PROCEDURE — 96374 THER/PROPH/DIAG INJ IV PUSH: CPT

## 2025-04-15 PROCEDURE — 85730 THROMBOPLASTIN TIME PARTIAL: CPT

## 2025-04-15 PROCEDURE — 93005 ELECTROCARDIOGRAM TRACING: CPT

## 2025-04-15 PROCEDURE — 36415 COLL VENOUS BLD VENIPUNCTURE: CPT

## 2025-04-15 PROCEDURE — 96375 TX/PRO/DX INJ NEW DRUG ADDON: CPT

## 2025-04-15 PROCEDURE — 83735 ASSAY OF MAGNESIUM: CPT

## 2025-04-15 PROCEDURE — 85025 COMPLETE CBC W/AUTO DIFF WBC: CPT

## 2025-04-15 PROCEDURE — 71045 X-RAY EXAM CHEST 1 VIEW: CPT | Mod: 26

## 2025-04-15 PROCEDURE — 84484 ASSAY OF TROPONIN QUANT: CPT

## 2025-04-15 PROCEDURE — 93010 ELECTROCARDIOGRAM REPORT: CPT

## 2025-04-15 PROCEDURE — 80048 BASIC METABOLIC PNL TOTAL CA: CPT

## 2025-04-15 RX ORDER — LABETALOL HYDROCHLORIDE 200 MG/1
10 TABLET, FILM COATED ORAL ONCE
Refills: 0 | Status: COMPLETED | OUTPATIENT
Start: 2025-04-15 | End: 2025-04-15

## 2025-04-15 RX ORDER — LORAZEPAM 4 MG/ML
0.5 VIAL (ML) INJECTION ONCE
Refills: 0 | Status: DISCONTINUED | OUTPATIENT
Start: 2025-04-15 | End: 2025-04-15

## 2025-04-15 RX ADMIN — Medication 1000 MILLILITER(S): at 18:13

## 2025-04-15 RX ADMIN — Medication 100 MILLIEQUIVALENT(S): at 19:36

## 2025-04-15 RX ADMIN — Medication 100 MILLIEQUIVALENT(S): at 20:36

## 2025-04-15 RX ADMIN — Medication 100 MILLIEQUIVALENT(S): at 18:34

## 2025-04-15 RX ADMIN — Medication 0.5 MILLIGRAM(S): at 18:10

## 2025-04-15 RX ADMIN — LABETALOL HYDROCHLORIDE 10 MILLIGRAM(S): 200 TABLET, FILM COATED ORAL at 18:10

## 2025-04-15 RX ADMIN — Medication 40 MILLIEQUIVALENT(S): at 18:34

## 2025-04-15 NOTE — ED PROVIDER NOTE - NSICDXPASTSURGICALHX_GEN_ALL_CORE_FT
PAST SURGICAL HISTORY:  Status post shoulder surgery left    
Have Your Spot(S) Been Treated In The Past?: has not been treated
Hpi Title: Evaluation of Skin Lesions
Year Removed: 1900
Hpi Title: Evaluation of a Skin Lesion
Location: L thigh and L mid upper back
Year Removed: 2015

## 2025-04-15 NOTE — ED PROVIDER NOTE - MDM ORDERS SUBMITTED SELECTION
Accidental fall at home resulting in abrasions to left lower leg- apply triple antibiotic if wound becomes itchy  Continue to shower as normal and dry wound thoroughly  Start Keflex for possible infection of wound  Sleep disturbance- pt has tried various medications, intolerant to CPAP   Refer to Sleep Specialist  Pt to investigate hypnosis as a treatment
Imaging Studies/Medications

## 2025-04-15 NOTE — ED ADULT TRIAGE NOTE - TEMPERATURE IN FAHRENHEIT (DEGREES F)
5 week check. Reset settings to DSL v5 and turned off any noise reducing options and microphone focusing items.  
98.6

## 2025-04-15 NOTE — ED ADULT NURSE NOTE - CHIEF COMPLAINT QUOTE
Pt bibems from work c/o palpitations induced from stress. Endorses B/L facial and hand tingling. Takes BP meds, compliant with medications. Stat ekg and monitor.  Bpm. Pt A&Ox4, - allergies

## 2025-04-15 NOTE — ED PROVIDER NOTE - PROGRESS NOTE DETAILS
Akil Sharma: Pt potassium 2.6, will replace and re-eval pt with nsr, K now 3.8, no cp will dc ARTHUR Hicks DO

## 2025-04-15 NOTE — ED PROVIDER NOTE - WR ORDER NAME 1
[FreeTextEntry3] : I, Colleen Moore, acted as a scribe on behalf of Dr. Brandy Marquez on 01/26/2022.\par \par All medical entries made by the scribe were at my, Dr. Brandy Marquez, direction and personally dictated by me on 01/26/2022. I have reviewed the chart and agree that the record accurately reflects my personal performance of the history, physical exam, assessment and plan. I have also personally directed, reviewed, and agreed with the chart.  Xray Chest 1 View- PORTABLE-Urgent

## 2025-04-15 NOTE — ED PROVIDER NOTE - CARE PROVIDER_API CALL
Pierce Gifford  Cardiology  [address] [place] [address]  Phone: [phone]  Fax: [phone]  Follow Up Time:

## 2025-04-15 NOTE — ED PROVIDER NOTE - OBJECTIVE STATEMENT
Hx of HLD and HTN, takes amlodipine and atorvastatin. Working today in [employer] and x2 hours ago, had sudden onset L sided palpitations with chest pain.

## 2025-04-15 NOTE — ED PROVIDER NOTE - PHYSICAL EXAMINATION
Gen:  Well appearing in NAD  Head:  NC/AT  HEENT: pupils perrl,no pharyngeal erythema, uvula midline  Cardiac: S1S2, tachycardic irregularly irregular  Abd: Soft, non tender  Resp: No distress, CTA   musculoskeletal:: no deformities, no swelling, strength +5/+5  Skin: warm and dry as visualized, no rashes  Neuro: HERRERA, aao x 4  Psych:alert, cooperative, appropriate mood and affect for situation

## 2025-04-15 NOTE — ED PROVIDER NOTE - CLINICAL SUMMARY MEDICAL DECISION MAKING FREE TEXT BOX
50 y/o M with hx of HTN presents with sinus tachycardia and chest pain. Will get labs including troponin, EKG, r/o acs, Pt not on any rate controller, Currently hypertensive, will give labetalol to control rate. Pt seems anxious will give some ativan.

## 2025-04-15 NOTE — ED ADULT TRIAGE NOTE - CHIEF COMPLAINT QUOTE
Pt bibems from work c/o palpitations induced from stress. Takes to BP meds, compliant with medications. Stat ekg and monitor.  Bpm. Pt A&Ox4, - allergies Pt bibems from work c/o palpitations induced from stress. Endorses B/L facial and hand tingling. Takes BP meds, compliant with medications. Stat ekg and monitor.  Bpm. Pt A&Ox4, - allergies

## 2025-04-15 NOTE — ED PROVIDER NOTE - NSICDXPASTMEDICALHX_GEN_ALL_CORE_FT
PAST MEDICAL HISTORY:  Gastritis without bleeding, unspecified chronicity, unspecified gastritis type     H/O: HTN (hypertension)     HLD (hyperlipidemia)     HTN (hypertension)     Hyperlipidemia, unspecified hyperlipidemia type     Partial small bowel obstruction

## 2025-04-15 NOTE — ED PROVIDER NOTE - NSFOLLOWUPINSTRUCTIONS_ED_ALL_ED_FT
Dolor de pecho inespecífico  Nonspecific Chest Pain  El dolor de pecho puede deberse a muchas afecciones diferentes. Algunas causas del dolor de pecho pueden ser potencialmente mortales. Estas requieren tratamiento inmediato. Algunas causas grave de dolor en el pecho son las siguientes:  Infarto de miocardio.  Un desgarro en el vaso sanguíneo principal del cuerpo.  Enrojecimiento e hinchazón (inflamación) alrededor del corazón.  Un coágulo de malik en los pulmones.  Otras causas de dolor en el pecho pueden no ser tan graves. Estos incluyen los siguientes:  Acidez estomacal.  Ansiedad o estrés.  Daño en los huesos o músculos del corazón.  Infecciones pulmonares.  El dolor de pecho puede provocar las siguientes sensaciones:  Dolor o molestias en el pecho.  Dolor opresivo, continuo o constrictivo.  Ardor u hormigueo.  Dolor sordo o intenso que empeora al moverse, toser o inhalar profundamente.  Dolor o molestias que también se sienten en la espalda, el zbigniew, la mandíbula, el hombro o el brazo, o dolor que se extiende a cualquiera de estas zonas.  Es difícil saber si la causa del dolor es algo grave o algo que no es tan grave. Por lo tanto, es importante que consulte al médico inmediatamente si tiene dolor en el pecho.    Siga estas indicaciones en zhang casa:  Medicamentos    Use los medicamentos de venta nel y los recetados solamente blane se lo haya indicado el médico.  Si le recetaron un antibiótico, tómelo blane se lo haya indicado el médico. No deje de cony el antibiótico aunque comience a sentirse mejor.  Estilo de carlos    A plate along with examples of foods in a healthy diet.  Jolie reposo blane se lo haya indicado el médico.  No consuma ningún producto que contenga nicotina o tabaco, blane cigarrillos, cigarrillos electrónicos y tabaco de mascar. Si necesita ayuda para dejar de consumir estos productos, consulte al médico.  No ruben alcohol.  Jolie cambios en zhang estilo de carlos según las indicaciones del médico. Estos pueden incluir lo siguiente:  Practicar actividad física con regularidad. Pregúntele al médico qué actividades son seguras para usted.  Seguir sena dieta cardiosaludable. Un especialista en dietas y alimentación (nutricionista) puede ayudarlo a que jolie elecciones saludables.  Mantener un peso saludable.  Tratar la diabetes o la presión arterial gary, si es necesario.  Reducir el estrés. Las actividades blane el yoga y las técnicas de relajación pueden ayudar.  Indicaciones generales    Esté atento a cualquier cambio en los síntomas. Informe a zhang médico si presenta algún cambio en los síntomas o si aparecen síntomas nuevos.  Evite las actividades que le causen dolor de pecho.  Concurra a todas las visitas de seguimiento blane se lo haya indicado el médico. [place] es importante. Es posible que tenga que someterse a más estudios si el dolor de pecho no desaparece.  Comuníquese con un médico si:  El dolor de pecho no desaparece.  Se siente deprimido.  Tiene fiebre.  Solicite ayuda inmediatamente si:  El dolor en el pecho es más intenso.  Tiene tos que empeora o tose con malik.  Tiene dolor muy intenso en el vientre (abdomen).  Pierde el conocimiento (se desmaya).  Tiene cualquiera de estos síntomas sin ninguna causa kerri:  Malestar repentino en el pecho.  Molestias repentinas en los brazos, la espalda, el zbigniew o la mandíbula.  Le falta el aire en cualquier momento.  Comienza a sudar de manera repentina o la piel se le humedece.  Siente malestar estomacal (náuseas).  Vomita.  Se siente repentinamente mareado o se desmaya.  Se siente muy débil o cansado.  El corazón comienza a latirle rápidamente o parece que se saltea latidos.  Estos síntomas pueden indicar sena emergencia. No espere a gerald si los síntomas desaparecen. Solicite atención médica de inmediato. Comuníquese con el servicio de emergencias de zhang localidad (911 en los [place]). No conduzca por stefanie propios medios hasta el hospital.    Resumen  El dolor de pecho puede deberse a muchas afecciones diferentes. La causa puede ser grave y requerir tratamiento de inmediato. Si tiene dolor de pecho, consulte al médico de inmediato.  Siga las indicaciones del médico para cony los medicamentos y hacer cambios en zhang estilo de carlos.  Concurra a todas las visitas de seguimiento blane se lo haya indicado el médico. [place] incluye las visitas para realizarle otros estudios si el dolor de pecho no desaparece.  Asegúrese de conocer los signos que indican que zhang afección ha empeorado. Obtenga ayuda de inmediato si tiene esos síntomas.  Esta información no tiene blane fin reemplazar el consejo del médico. Asegúrese de hacerle al médico revaalakiier pregunta que tenga.    Document Revised: 03/24/2022 Document Reviewed: 11/02/2023  Elsevier Patient Education © 2025 Elsevier Inc.  Elsevier logo  Terms and Conditions  Privacy Policy  Editorial Policy  All content on this site: Copyright © 2025 Elsevier, its licensors, and contributors. All rights are reserved, including those for text and data mining, AI training, and similar technologies. For all open access content, the Creative Commons licensing terms apply.  Cookies are used by this site. To decli

## 2025-04-15 NOTE — ED ADULT NURSE NOTE - OBJECTIVE STATEMENT
pt is alert and oriented x4. pt is on RA. pt comes to the ED c/o of chest tightness/palpitations which occurred at work. pt stated the chest pressure was continuos today. in the past it has been intermittent. pt denies SOB. pt has hx of HTN. pt works at construction. pt noted to have chills.

## 2025-04-15 NOTE — ED PROVIDER NOTE - NSICDXFAMILYHX_GEN_ALL_CORE_FT
FAMILY HISTORY:  Father  Still living? No  Family history of prostate cancer in father, Age at diagnosis: 61-70

## 2025-04-15 NOTE — ED ADULT NURSE NOTE - NS ED PATIENT SAFETY CONCERN
Prior authorization for Lovaza was denied, due to being non-formulary  Formulary medications are Fenofibrate and Gemfibrozil  No

## 2025-04-15 NOTE — ED ADULT NURSE REASSESSMENT NOTE - NS ED NURSE REASSESS COMMENT FT1
Rec'd from JESS Loja at bedside, pt resting comfortably, reports chest pain improved, denies SOB, HR 60's, B/P now 147/98. Rec'd from JESS Burnett at bedside, pt resting comfortably, reports chest pain improved, denies SOB, HR 60's, B/P now 147/98.

## 2025-04-15 NOTE — ED PROVIDER NOTE - PATIENT PORTAL LINK FT
You can access the FollowMyHealth Patient Portal offered by [facility] by registering at the following website: [email or web address]. By joining [facility]’s FollowMyHealth portal, you will also be able to view your health information using other applications (apps) compatible with our system.

## 2025-04-19 ENCOUNTER — EMERGENCY (EMERGENCY)
Facility: HOSPITAL | Age: 50
LOS: 0 days | Discharge: HOME | End: 2025-04-19
Attending: EMERGENCY MEDICINE
Payer: MEDICAID

## 2025-04-19 VITALS — SYSTOLIC BLOOD PRESSURE: 173 MMHG | DIASTOLIC BLOOD PRESSURE: 93 MMHG

## 2025-04-19 VITALS — HEIGHT: 66 IN

## 2025-04-19 DIAGNOSIS — I10 ESSENTIAL (PRIMARY) HYPERTENSION: ICD-10-CM

## 2025-04-19 DIAGNOSIS — E78.5 HYPERLIPIDEMIA, UNSPECIFIED: ICD-10-CM

## 2025-04-19 DIAGNOSIS — R11.10 VOMITING, UNSPECIFIED: ICD-10-CM

## 2025-04-19 DIAGNOSIS — R10.13 EPIGASTRIC PAIN: ICD-10-CM

## 2025-04-19 DIAGNOSIS — Z98.890 OTHER SPECIFIED POSTPROCEDURAL STATES: Chronic | ICD-10-CM

## 2025-04-19 LAB
ALBUMIN SERPL ELPH-MCNC: 3.8 G/DL — SIGNIFICANT CHANGE UP (ref 3.3–5)
ALP SERPL-CCNC: 86 U/L — SIGNIFICANT CHANGE UP (ref 40–120)
ALT FLD-CCNC: 54 U/L — SIGNIFICANT CHANGE UP (ref 12–78)
ANION GAP SERPL CALC-SCNC: 7 MMOL/L — SIGNIFICANT CHANGE UP (ref 5–17)
APPEARANCE UR: CLEAR — SIGNIFICANT CHANGE UP
AST SERPL-CCNC: 41 U/L — HIGH (ref 15–37)
BACTERIA # UR AUTO: NEGATIVE /HPF — SIGNIFICANT CHANGE UP
BASOPHILS # BLD AUTO: 0.05 K/UL — SIGNIFICANT CHANGE UP (ref 0–0.2)
BASOPHILS NFR BLD AUTO: 0.5 % — SIGNIFICANT CHANGE UP (ref 0–2)
BILIRUB SERPL-MCNC: 0.4 MG/DL — SIGNIFICANT CHANGE UP (ref 0.2–1.2)
BILIRUB UR-MCNC: NEGATIVE — SIGNIFICANT CHANGE UP
BUN SERPL-MCNC: 21 MG/DL — SIGNIFICANT CHANGE UP (ref 7–23)
CALCIUM SERPL-MCNC: 9.4 MG/DL — SIGNIFICANT CHANGE UP (ref 8.5–10.1)
CAST: 1 /LPF — SIGNIFICANT CHANGE UP (ref 0–4)
CHLORIDE SERPL-SCNC: 89 MMOL/L — LOW (ref 96–108)
CO2 SERPL-SCNC: 30 MMOL/L — SIGNIFICANT CHANGE UP (ref 22–31)
COLOR SPEC: ABNORMAL
CREAT SERPL-MCNC: 1.19 MG/DL — SIGNIFICANT CHANGE UP (ref 0.5–1.3)
DIFF PNL FLD: ABNORMAL
EGFR: 75 ML/MIN/1.73M2 — SIGNIFICANT CHANGE UP
EGFR: 75 ML/MIN/1.73M2 — SIGNIFICANT CHANGE UP
EOSINOPHIL # BLD AUTO: 0.48 K/UL — SIGNIFICANT CHANGE UP (ref 0–0.5)
EOSINOPHIL NFR BLD AUTO: 4.8 % — SIGNIFICANT CHANGE UP (ref 0–6)
GLUCOSE BLDC GLUCOMTR-MCNC: 99 MG/DL — SIGNIFICANT CHANGE UP (ref 70–99)
GLUCOSE SERPL-MCNC: 112 MG/DL — HIGH (ref 70–99)
GLUCOSE UR QL: NEGATIVE MG/DL — SIGNIFICANT CHANGE UP
HCT VFR BLD CALC: 44.3 % — SIGNIFICANT CHANGE UP (ref 39–50)
HGB BLD-MCNC: 16.1 G/DL — SIGNIFICANT CHANGE UP (ref 13–17)
IMM GRANULOCYTES # BLD AUTO: 0.02 K/UL — SIGNIFICANT CHANGE UP (ref 0–0.07)
IMM GRANULOCYTES NFR BLD AUTO: 0.2 % — SIGNIFICANT CHANGE UP (ref 0–0.9)
KETONES UR-MCNC: NEGATIVE MG/DL — SIGNIFICANT CHANGE UP
LEUKOCYTE ESTERASE UR-ACNC: NEGATIVE — SIGNIFICANT CHANGE UP
LIDOCAIN IGE QN: 35 U/L — SIGNIFICANT CHANGE UP (ref 13–75)
LYMPHOCYTES # BLD AUTO: 2.17 K/UL — SIGNIFICANT CHANGE UP (ref 1–3.3)
LYMPHOCYTES NFR BLD AUTO: 21.9 % — SIGNIFICANT CHANGE UP (ref 13–44)
MCHC RBC-ENTMCNC: 30.3 PG — SIGNIFICANT CHANGE UP (ref 27–34)
MCHC RBC-ENTMCNC: 36.3 G/DL — HIGH (ref 32–36)
MCV RBC AUTO: 83.4 FL — SIGNIFICANT CHANGE UP (ref 80–100)
MONOCYTES # BLD AUTO: 0.77 K/UL — SIGNIFICANT CHANGE UP (ref 0–0.9)
MONOCYTES NFR BLD AUTO: 7.8 % — SIGNIFICANT CHANGE UP (ref 2–14)
NEUTROPHILS # BLD AUTO: 6.42 K/UL — SIGNIFICANT CHANGE UP (ref 1.8–7.4)
NEUTROPHILS NFR BLD AUTO: 64.8 % — SIGNIFICANT CHANGE UP (ref 43–77)
NITRITE UR-MCNC: NEGATIVE — SIGNIFICANT CHANGE UP
NRBC # BLD AUTO: 0 K/UL — SIGNIFICANT CHANGE UP (ref 0–0)
NRBC # FLD: 0 K/UL — SIGNIFICANT CHANGE UP (ref 0–0)
NRBC BLD AUTO-RTO: 0 /100 WBCS — SIGNIFICANT CHANGE UP (ref 0–0)
PH UR: 8 — SIGNIFICANT CHANGE UP (ref 5–8)
PLATELET # BLD AUTO: 322 K/UL — SIGNIFICANT CHANGE UP (ref 150–400)
PMV BLD: 9.2 FL — SIGNIFICANT CHANGE UP (ref 7–13)
POTASSIUM SERPL-MCNC: 3.1 MMOL/L — LOW (ref 3.5–5.3)
POTASSIUM SERPL-SCNC: 3.1 MMOL/L — LOW (ref 3.5–5.3)
PROT SERPL-MCNC: 7.6 GM/DL — SIGNIFICANT CHANGE UP (ref 6–8.3)
PROT UR-MCNC: 100 MG/DL
RBC # BLD: 5.31 M/UL — SIGNIFICANT CHANGE UP (ref 4.2–5.8)
RBC # FLD: 11.5 % — SIGNIFICANT CHANGE UP (ref 10.3–14.5)
RBC CASTS # UR COMP ASSIST: 111 /HPF — HIGH (ref 0–4)
SODIUM SERPL-SCNC: 126 MMOL/L — LOW (ref 135–145)
SP GR SPEC: 1.01 — SIGNIFICANT CHANGE UP (ref 1–1.03)
SQUAMOUS # UR AUTO: 0 /HPF — SIGNIFICANT CHANGE UP (ref 0–5)
UROBILINOGEN FLD QL: 0.2 MG/DL — SIGNIFICANT CHANGE UP (ref 0.2–1)
WBC # BLD: 9.91 K/UL — SIGNIFICANT CHANGE UP (ref 3.8–10.5)
WBC # FLD AUTO: 9.91 K/UL — SIGNIFICANT CHANGE UP (ref 3.8–10.5)
WBC UR QL: 1 /HPF — SIGNIFICANT CHANGE UP (ref 0–5)

## 2025-04-19 PROCEDURE — 81001 URINALYSIS AUTO W/SCOPE: CPT

## 2025-04-19 PROCEDURE — 96375 TX/PRO/DX INJ NEW DRUG ADDON: CPT

## 2025-04-19 PROCEDURE — 85025 COMPLETE CBC W/AUTO DIFF WBC: CPT

## 2025-04-19 PROCEDURE — 99285 EMERGENCY DEPT VISIT HI MDM: CPT

## 2025-04-19 PROCEDURE — 80053 COMPREHEN METABOLIC PANEL: CPT

## 2025-04-19 PROCEDURE — 93010 ELECTROCARDIOGRAM REPORT: CPT

## 2025-04-19 PROCEDURE — 96374 THER/PROPH/DIAG INJ IV PUSH: CPT | Mod: XU

## 2025-04-19 PROCEDURE — 74177 CT ABD & PELVIS W/CONTRAST: CPT | Mod: 26

## 2025-04-19 PROCEDURE — 83690 ASSAY OF LIPASE: CPT

## 2025-04-19 PROCEDURE — 93005 ELECTROCARDIOGRAM TRACING: CPT

## 2025-04-19 PROCEDURE — 74177 CT ABD & PELVIS W/CONTRAST: CPT | Mod: MC

## 2025-04-19 PROCEDURE — 82962 GLUCOSE BLOOD TEST: CPT

## 2025-04-19 PROCEDURE — 99285 EMERGENCY DEPT VISIT HI MDM: CPT | Mod: 25

## 2025-04-19 PROCEDURE — 36415 COLL VENOUS BLD VENIPUNCTURE: CPT

## 2025-04-19 RX ORDER — ONDANSETRON HCL/PF 4 MG/2 ML
4 VIAL (ML) INJECTION ONCE
Refills: 0 | Status: COMPLETED | OUTPATIENT
Start: 2025-04-19 | End: 2025-04-19

## 2025-04-19 RX ORDER — LORAZEPAM 4 MG/ML
1 VIAL (ML) INJECTION ONCE
Refills: 0 | Status: DISCONTINUED | OUTPATIENT
Start: 2025-04-19 | End: 2025-04-19

## 2025-04-19 RX ADMIN — Medication 4 MILLIGRAM(S): at 19:08

## 2025-04-19 RX ADMIN — Medication 2000 MILLILITER(S): at 19:07

## 2025-04-19 RX ADMIN — Medication 1 MILLIGRAM(S): at 19:08

## 2025-04-23 ENCOUNTER — EMERGENCY (EMERGENCY)
Facility: HOSPITAL | Age: 50
LOS: 0 days | Discharge: HOME | End: 2025-04-23
Attending: STUDENT IN AN ORGANIZED HEALTH CARE EDUCATION/TRAINING PROGRAM
Payer: MEDICAID

## 2025-04-23 VITALS
RESPIRATION RATE: 17 BRPM | HEART RATE: 73 BPM | OXYGEN SATURATION: 99 % | WEIGHT: 153.22 LBS | DIASTOLIC BLOOD PRESSURE: 107 MMHG | SYSTOLIC BLOOD PRESSURE: 159 MMHG | TEMPERATURE: 99 F

## 2025-04-23 VITALS — HEIGHT: 66 IN | WEIGHT: 145.06 LBS

## 2025-04-23 DIAGNOSIS — I10 ESSENTIAL (PRIMARY) HYPERTENSION: ICD-10-CM

## 2025-04-23 DIAGNOSIS — R10.31 RIGHT LOWER QUADRANT PAIN: ICD-10-CM

## 2025-04-23 DIAGNOSIS — E78.5 HYPERLIPIDEMIA, UNSPECIFIED: ICD-10-CM

## 2025-04-23 DIAGNOSIS — R11.2 NAUSEA WITH VOMITING, UNSPECIFIED: ICD-10-CM

## 2025-04-23 DIAGNOSIS — Z87.19 PERSONAL HISTORY OF OTHER DISEASES OF THE DIGESTIVE SYSTEM: ICD-10-CM

## 2025-04-23 DIAGNOSIS — Z98.890 OTHER SPECIFIED POSTPROCEDURAL STATES: Chronic | ICD-10-CM

## 2025-04-23 DIAGNOSIS — R07.9 CHEST PAIN, UNSPECIFIED: ICD-10-CM

## 2025-04-23 LAB
ALBUMIN SERPL ELPH-MCNC: 3.6 G/DL — SIGNIFICANT CHANGE UP (ref 3.3–5)
ALP SERPL-CCNC: 87 U/L — SIGNIFICANT CHANGE UP (ref 40–120)
ALT FLD-CCNC: 50 U/L — SIGNIFICANT CHANGE UP (ref 12–78)
ANION GAP SERPL CALC-SCNC: 8 MMOL/L — SIGNIFICANT CHANGE UP (ref 5–17)
AST SERPL-CCNC: 36 U/L — SIGNIFICANT CHANGE UP (ref 15–37)
BASOPHILS # BLD AUTO: 0.05 K/UL — SIGNIFICANT CHANGE UP (ref 0–0.2)
BASOPHILS NFR BLD AUTO: 0.7 % — SIGNIFICANT CHANGE UP (ref 0–2)
BILIRUB SERPL-MCNC: 0.4 MG/DL — SIGNIFICANT CHANGE UP (ref 0.2–1.2)
BUN SERPL-MCNC: 19 MG/DL — SIGNIFICANT CHANGE UP (ref 7–23)
CALCIUM SERPL-MCNC: 9.4 MG/DL — SIGNIFICANT CHANGE UP (ref 8.5–10.1)
CHLORIDE SERPL-SCNC: 89 MMOL/L — LOW (ref 96–108)
CO2 SERPL-SCNC: 30 MMOL/L — SIGNIFICANT CHANGE UP (ref 22–31)
CREAT SERPL-MCNC: 0.9 MG/DL — SIGNIFICANT CHANGE UP (ref 0.5–1.3)
EGFR: 105 ML/MIN/1.73M2 — SIGNIFICANT CHANGE UP
EGFR: 105 ML/MIN/1.73M2 — SIGNIFICANT CHANGE UP
EOSINOPHIL # BLD AUTO: 0.51 K/UL — HIGH (ref 0–0.5)
EOSINOPHIL NFR BLD AUTO: 6.8 % — HIGH (ref 0–6)
GLUCOSE SERPL-MCNC: 118 MG/DL — HIGH (ref 70–99)
HCT VFR BLD CALC: 43.4 % — SIGNIFICANT CHANGE UP (ref 39–50)
HGB BLD-MCNC: 15.9 G/DL — SIGNIFICANT CHANGE UP (ref 13–17)
IMM GRANULOCYTES # BLD AUTO: 0.01 K/UL — SIGNIFICANT CHANGE UP (ref 0–0.07)
IMM GRANULOCYTES NFR BLD AUTO: 0.1 % — SIGNIFICANT CHANGE UP (ref 0–0.9)
LYMPHOCYTES # BLD AUTO: 2.32 K/UL — SIGNIFICANT CHANGE UP (ref 1–3.3)
LYMPHOCYTES NFR BLD AUTO: 30.9 % — SIGNIFICANT CHANGE UP (ref 13–44)
MAGNESIUM SERPL-MCNC: 2.2 MG/DL — SIGNIFICANT CHANGE UP (ref 1.6–2.6)
MCHC RBC-ENTMCNC: 30.5 PG — SIGNIFICANT CHANGE UP (ref 27–34)
MCHC RBC-ENTMCNC: 36.6 G/DL — HIGH (ref 32–36)
MCV RBC AUTO: 83.1 FL — SIGNIFICANT CHANGE UP (ref 80–100)
MONOCYTES # BLD AUTO: 0.57 K/UL — SIGNIFICANT CHANGE UP (ref 0–0.9)
MONOCYTES NFR BLD AUTO: 7.6 % — SIGNIFICANT CHANGE UP (ref 2–14)
NEUTROPHILS # BLD AUTO: 4.05 K/UL — SIGNIFICANT CHANGE UP (ref 1.8–7.4)
NEUTROPHILS NFR BLD AUTO: 53.9 % — SIGNIFICANT CHANGE UP (ref 43–77)
NRBC # BLD AUTO: 0 K/UL — SIGNIFICANT CHANGE UP (ref 0–0)
NRBC # FLD: 0 K/UL — SIGNIFICANT CHANGE UP (ref 0–0)
NRBC BLD AUTO-RTO: 0 /100 WBCS — SIGNIFICANT CHANGE UP (ref 0–0)
PLATELET # BLD AUTO: 304 K/UL — SIGNIFICANT CHANGE UP (ref 150–400)
PMV BLD: 9 FL — SIGNIFICANT CHANGE UP (ref 7–13)
POTASSIUM SERPL-MCNC: 3.2 MMOL/L — LOW (ref 3.5–5.3)
POTASSIUM SERPL-SCNC: 3.2 MMOL/L — LOW (ref 3.5–5.3)
PROT SERPL-MCNC: 7.3 GM/DL — SIGNIFICANT CHANGE UP (ref 6–8.3)
RBC # BLD: 5.22 M/UL — SIGNIFICANT CHANGE UP (ref 4.2–5.8)
RBC # FLD: 11.6 % — SIGNIFICANT CHANGE UP (ref 10.3–14.5)
SODIUM SERPL-SCNC: 127 MMOL/L — LOW (ref 135–145)
TROPONIN I, HIGH SENSITIVITY RESULT: 6.12 NG/L — SIGNIFICANT CHANGE UP
WBC # BLD: 7.51 K/UL — SIGNIFICANT CHANGE UP (ref 3.8–10.5)
WBC # FLD AUTO: 7.51 K/UL — SIGNIFICANT CHANGE UP (ref 3.8–10.5)

## 2025-04-23 PROCEDURE — 96374 THER/PROPH/DIAG INJ IV PUSH: CPT | Mod: XU

## 2025-04-23 PROCEDURE — 99285 EMERGENCY DEPT VISIT HI MDM: CPT

## 2025-04-23 PROCEDURE — 74177 CT ABD & PELVIS W/CONTRAST: CPT | Mod: 26

## 2025-04-23 PROCEDURE — 80053 COMPREHEN METABOLIC PANEL: CPT

## 2025-04-23 PROCEDURE — 74177 CT ABD & PELVIS W/CONTRAST: CPT | Mod: MC

## 2025-04-23 PROCEDURE — 96375 TX/PRO/DX INJ NEW DRUG ADDON: CPT

## 2025-04-23 PROCEDURE — 83735 ASSAY OF MAGNESIUM: CPT

## 2025-04-23 PROCEDURE — 84484 ASSAY OF TROPONIN QUANT: CPT

## 2025-04-23 PROCEDURE — 99285 EMERGENCY DEPT VISIT HI MDM: CPT | Mod: 25

## 2025-04-23 PROCEDURE — 71045 X-RAY EXAM CHEST 1 VIEW: CPT | Mod: 26

## 2025-04-23 PROCEDURE — 36415 COLL VENOUS BLD VENIPUNCTURE: CPT

## 2025-04-23 PROCEDURE — 85025 COMPLETE CBC W/AUTO DIFF WBC: CPT

## 2025-04-23 PROCEDURE — 71045 X-RAY EXAM CHEST 1 VIEW: CPT

## 2025-04-23 PROCEDURE — 93010 ELECTROCARDIOGRAM REPORT: CPT

## 2025-04-23 PROCEDURE — 93005 ELECTROCARDIOGRAM TRACING: CPT

## 2025-04-23 RX ORDER — ONDANSETRON HCL/PF 4 MG/2 ML
4 VIAL (ML) INJECTION ONCE
Refills: 0 | Status: COMPLETED | OUTPATIENT
Start: 2025-04-23 | End: 2025-04-23

## 2025-04-23 RX ORDER — KETOROLAC TROMETHAMINE 30 MG/ML
15 INJECTION, SOLUTION INTRAMUSCULAR; INTRAVENOUS ONCE
Refills: 0 | Status: DISCONTINUED | OUTPATIENT
Start: 2025-04-23 | End: 2025-04-23

## 2025-04-23 RX ADMIN — Medication 4 MILLIGRAM(S): at 19:22

## 2025-04-23 RX ADMIN — Medication 1000 MILLILITER(S): at 19:22

## 2025-04-23 RX ADMIN — KETOROLAC TROMETHAMINE 15 MILLIGRAM(S): 30 INJECTION, SOLUTION INTRAMUSCULAR; INTRAVENOUS at 19:22

## 2025-04-23 NOTE — ED ADULT NURSE NOTE - OBJECTIVE STATEMENT
48 y/o male presents to the ED c/o generalized abdominal pain with nausea and vomiting. Pt also endorsing "chest tightness" and difficulty sleeping for the last week. Pt endorses no other complaints at this time, safety and comfort maintained.

## 2025-04-23 NOTE — ED STATDOCS - CLINICAL SUMMARY MEDICAL DECISION MAKING FREE TEXT BOX
50 y/o M with PMHX of HLD, HTN, partial SBO, gastritis, anxiety presents to ED c/o RLQ abdominal pain, nausea, vomiting  x2 days with chest pain x1 week and unable to sleep for past 3 nights due to symptoms.  Plan labs, imaging,pain control . Re-eval. 48 y/o M with PMHX of HLD, HTN, partial SBO, gastritis, anxiety presents to ED c/o RLQ abdominal pain, nausea, vomiting  x2 days with chest pain x1 week.  Plan labs, imaging,pain control . Re-eval.

## 2025-04-23 NOTE — ED STATDOCS - PATIENT PORTAL LINK FT
You can access the FollowMyHealth Patient Portal offered by [facility] by registering at the following website: [email or web address]. By joining [employer]’s FollowMyHealth portal, you will also be able to view your health information using other applications (apps) compatible with our system.

## 2025-04-23 NOTE — ED ADULT TRIAGE NOTE - CHIEF COMPLAINT QUOTE
Patient presents to the ER with complaints of abdominal pain, N/V, chest tightness and not being able to sleep since last week.

## 2025-04-23 NOTE — ED STATDOCS - NSFOLLOWUPINSTRUCTIONS_ED_ALL_ED_FT
Please follow-up with your doctor(s) within the next 3 days, but see medical sooner if your symptoms persist or worsen.  Please call tomorrow for an appointment.    You were given a copy of your labs and/or imaging.  Please go-over these with your doctor(s).     If you have any worsening of symptoms or any other concerns please see your doctor or return to the ED immediately.    Please continue taking your home medications as directed    ANY PENDING RESULTS: You can access the FollowOpathicaHealth Patient Portal offered by [facility] by registering at the following website: [email or web address]. By joining [employer] [facility]’s FollowMyHealth portal, you will also be able to view your health information using other applications (apps) compatible with our system.

## 2025-04-23 NOTE — ED STATDOCS - OBJECTIVE STATEMENT
50 y/o M with PMHX of HLD, HTN, partial SBO, gastritis, anxiety presents to ED c/o RLQ abdominal pain, nausea, vomiting  x2 days with chest pain x1 week. States he is unable to sleep for past 3 nights. States he is stressed and unable to sleep due to symptoms. Denies SI/HI. Denies diarrhea, urinary symptoms, fever, chills.

## 2025-04-23 NOTE — ED STATDOCS - PROGRESS NOTE DETAILS
Poonam: pt well known to ED. labs and CT not actionable. improved. tolerating PO. Discussed with patient need to return to ED if symptoms don't continue to improve or recur or develops any new or worsening symptoms that are of concern.

## 2025-04-23 NOTE — ED STATDOCS - NS ED MD DISPO DISCHARGE CCDA
Attempted to call patient regarding CCM intro. No answer. LMTCB and mychart info sent. Will ryan for follow up. Patient/Caregiver provided printed discharge information.

## 2025-04-23 NOTE — ED STATDOCS - PHYSICAL EXAMINATION
General: Patient in no acute distress, AAOX3.   HENMT: NC/AT, no nasal congestion, MMM  Neck: supple  CVS: regular rate and rhythm, no murmur  Resp: Good air entry bilaterally, No wheeze/rhonchi.  Abd: RLQ TTP   Ext: FROM in all ext, 2+ pulses throughout  BACK: no midline tenderness, no stepoffs, no CVA ttp  NEURO: no focal deficit, gross motor and sensory intact throughout, gait stable. General: Patient in no acute distress, AAOX3.   HENMT: NC/AT, no nasal congestion, MMM  Neck: supple  CVS: regular rate and rhythm, no murmur  Resp: Good air entry bilaterally, No wheeze/rhonchi.  Abd: RLQ TTP , normal BS, no rebound.  Ext: FROM in all ext, 2+ pulses throughout  BACK: no midline tenderness, no stepoffs, no CVA ttp  NEURO: no focal deficit, gross motor and sensory intact throughout, gait stable.

## 2025-04-23 NOTE — ED STATDOCS - ATTENDING APP SHARED VISIT CONTRIBUTION OF CARE
I Arely Dwyer DO have personally seen and examined this patient.  I have fully participated in the care of this patient.  The initial assessment was performed by myself and then the patient was handed off to the ACP. The patient was followed and re-evaluated by the ACP. All labs, imaging and procedures were evaluated and performed by the ACP and I was available for consultation if any questions in the patients care came up.I have made amendments to the documentation where appropriate and otherwise agree with the history, physical exam, and plan as documented by the NITO.

## 2025-04-24 ENCOUNTER — APPOINTMENT (OUTPATIENT)
Dept: CARDIOLOGY | Facility: HOSPITAL | Age: 50
End: 2025-04-24

## 2025-04-24 ENCOUNTER — APPOINTMENT (OUTPATIENT)
Dept: CARDIOLOGY | Facility: CLINIC | Age: 50
End: 2025-04-24

## 2025-04-30 ENCOUNTER — INPATIENT (INPATIENT)
Facility: HOSPITAL | Age: 50
LOS: 1 days | Discharge: HOME | DRG: 191 | End: 2025-05-02
Attending: HOSPITALIST | Admitting: STUDENT IN AN ORGANIZED HEALTH CARE EDUCATION/TRAINING PROGRAM
Payer: MEDICAID

## 2025-04-30 VITALS — WEIGHT: 149.91 LBS | HEIGHT: 66 IN

## 2025-04-30 DIAGNOSIS — E87.1 HYPO-OSMOLALITY AND HYPONATREMIA: ICD-10-CM

## 2025-04-30 DIAGNOSIS — Z98.890 OTHER SPECIFIED POSTPROCEDURAL STATES: Chronic | ICD-10-CM

## 2025-04-30 LAB
ALBUMIN SERPL ELPH-MCNC: 3.7 G/DL — SIGNIFICANT CHANGE UP (ref 3.3–5)
ALP SERPL-CCNC: 75 U/L — SIGNIFICANT CHANGE UP (ref 40–120)
ALT FLD-CCNC: 52 U/L — SIGNIFICANT CHANGE UP (ref 12–78)
ANION GAP SERPL CALC-SCNC: 6 MMOL/L — SIGNIFICANT CHANGE UP (ref 5–17)
AST SERPL-CCNC: 27 U/L — SIGNIFICANT CHANGE UP (ref 15–37)
BASOPHILS # BLD AUTO: 0.05 K/UL — SIGNIFICANT CHANGE UP (ref 0–0.2)
BASOPHILS NFR BLD AUTO: 0.6 % — SIGNIFICANT CHANGE UP (ref 0–2)
BILIRUB SERPL-MCNC: 0.6 MG/DL — SIGNIFICANT CHANGE UP (ref 0.2–1.2)
BUN SERPL-MCNC: 14 MG/DL — SIGNIFICANT CHANGE UP (ref 7–23)
CALCIUM SERPL-MCNC: 9.4 MG/DL — SIGNIFICANT CHANGE UP (ref 8.5–10.1)
CHLORIDE SERPL-SCNC: 87 MMOL/L — LOW (ref 96–108)
CO2 SERPL-SCNC: 31 MMOL/L — SIGNIFICANT CHANGE UP (ref 22–31)
CREAT SERPL-MCNC: 0.87 MG/DL — SIGNIFICANT CHANGE UP (ref 0.5–1.3)
D DIMER BLD IA.RAPID-MCNC: <150 NG/ML DDU — SIGNIFICANT CHANGE UP
EGFR: 106 ML/MIN/1.73M2 — SIGNIFICANT CHANGE UP
EGFR: 106 ML/MIN/1.73M2 — SIGNIFICANT CHANGE UP
EOSINOPHIL # BLD AUTO: 0.84 K/UL — HIGH (ref 0–0.5)
EOSINOPHIL NFR BLD AUTO: 9.5 % — HIGH (ref 0–6)
FLUAV AG NPH QL: SIGNIFICANT CHANGE UP
FLUBV AG NPH QL: SIGNIFICANT CHANGE UP
GLUCOSE SERPL-MCNC: 129 MG/DL — HIGH (ref 70–99)
HCT VFR BLD CALC: 43.3 % — SIGNIFICANT CHANGE UP (ref 39–50)
HGB BLD-MCNC: 16 G/DL — SIGNIFICANT CHANGE UP (ref 13–17)
IMM GRANULOCYTES # BLD AUTO: 0.03 K/UL — SIGNIFICANT CHANGE UP (ref 0–0.07)
IMM GRANULOCYTES NFR BLD AUTO: 0.3 % — SIGNIFICANT CHANGE UP (ref 0–0.9)
LYMPHOCYTES # BLD AUTO: 2.87 K/UL — SIGNIFICANT CHANGE UP (ref 1–3.3)
LYMPHOCYTES NFR BLD AUTO: 32.3 % — SIGNIFICANT CHANGE UP (ref 13–44)
MAGNESIUM SERPL-MCNC: 1.9 MG/DL — SIGNIFICANT CHANGE UP (ref 1.6–2.6)
MCHC RBC-ENTMCNC: 30.4 PG — SIGNIFICANT CHANGE UP (ref 27–34)
MCHC RBC-ENTMCNC: 37 G/DL — HIGH (ref 32–36)
MCV RBC AUTO: 82.2 FL — SIGNIFICANT CHANGE UP (ref 80–100)
MONOCYTES # BLD AUTO: 0.67 K/UL — SIGNIFICANT CHANGE UP (ref 0–0.9)
MONOCYTES NFR BLD AUTO: 7.5 % — SIGNIFICANT CHANGE UP (ref 2–14)
NEUTROPHILS # BLD AUTO: 4.42 K/UL — SIGNIFICANT CHANGE UP (ref 1.8–7.4)
NEUTROPHILS NFR BLD AUTO: 49.8 % — SIGNIFICANT CHANGE UP (ref 43–77)
NRBC # BLD AUTO: 0 K/UL — SIGNIFICANT CHANGE UP (ref 0–0)
NRBC # FLD: 0 K/UL — SIGNIFICANT CHANGE UP (ref 0–0)
NRBC BLD AUTO-RTO: 0 /100 WBCS — SIGNIFICANT CHANGE UP (ref 0–0)
PLATELET # BLD AUTO: 343 K/UL — SIGNIFICANT CHANGE UP (ref 150–400)
PMV BLD: 9 FL — SIGNIFICANT CHANGE UP (ref 7–13)
POTASSIUM SERPL-MCNC: 3.1 MMOL/L — LOW (ref 3.5–5.3)
POTASSIUM SERPL-SCNC: 3.1 MMOL/L — LOW (ref 3.5–5.3)
PROT SERPL-MCNC: 7.1 GM/DL — SIGNIFICANT CHANGE UP (ref 6–8.3)
RBC # BLD: 5.27 M/UL — SIGNIFICANT CHANGE UP (ref 4.2–5.8)
RBC # FLD: 11.4 % — SIGNIFICANT CHANGE UP (ref 10.3–14.5)
RSV RNA NPH QL NAA+NON-PROBE: SIGNIFICANT CHANGE UP
SARS-COV-2 RNA SPEC QL NAA+PROBE: SIGNIFICANT CHANGE UP
SODIUM SERPL-SCNC: 124 MMOL/L — LOW (ref 135–145)
SOURCE RESPIRATORY: SIGNIFICANT CHANGE UP
TROPONIN I, HIGH SENSITIVITY RESULT: 6.33 NG/L — SIGNIFICANT CHANGE UP
WBC # BLD: 8.88 K/UL — SIGNIFICANT CHANGE UP (ref 3.8–10.5)
WBC # FLD AUTO: 8.88 K/UL — SIGNIFICANT CHANGE UP (ref 3.8–10.5)

## 2025-04-30 PROCEDURE — 84100 ASSAY OF PHOSPHORUS: CPT

## 2025-04-30 PROCEDURE — 83735 ASSAY OF MAGNESIUM: CPT

## 2025-04-30 PROCEDURE — 71045 X-RAY EXAM CHEST 1 VIEW: CPT | Mod: 26

## 2025-04-30 PROCEDURE — 99285 EMERGENCY DEPT VISIT HI MDM: CPT

## 2025-04-30 PROCEDURE — 80048 BASIC METABOLIC PNL TOTAL CA: CPT

## 2025-04-30 PROCEDURE — C1769: CPT

## 2025-04-30 PROCEDURE — 83935 ASSAY OF URINE OSMOLALITY: CPT

## 2025-04-30 PROCEDURE — 84443 ASSAY THYROID STIM HORMONE: CPT

## 2025-04-30 PROCEDURE — 93017 CV STRESS TEST TRACING ONLY: CPT

## 2025-04-30 PROCEDURE — A9500: CPT

## 2025-04-30 PROCEDURE — 82533 TOTAL CORTISOL: CPT

## 2025-04-30 PROCEDURE — 93458 L HRT ARTERY/VENTRICLE ANGIO: CPT

## 2025-04-30 PROCEDURE — 93005 ELECTROCARDIOGRAM TRACING: CPT

## 2025-04-30 PROCEDURE — 93010 ELECTROCARDIOGRAM REPORT: CPT

## 2025-04-30 PROCEDURE — 83930 ASSAY OF BLOOD OSMOLALITY: CPT

## 2025-04-30 PROCEDURE — 80061 LIPID PANEL: CPT

## 2025-04-30 PROCEDURE — 84300 ASSAY OF URINE SODIUM: CPT

## 2025-04-30 PROCEDURE — 36415 COLL VENOUS BLD VENIPUNCTURE: CPT

## 2025-04-30 PROCEDURE — C1887: CPT

## 2025-04-30 PROCEDURE — C1894: CPT

## 2025-04-30 PROCEDURE — 83036 HEMOGLOBIN GLYCOSYLATED A1C: CPT

## 2025-04-30 PROCEDURE — 85027 COMPLETE CBC AUTOMATED: CPT

## 2025-04-30 RX ORDER — B1/B2/B3/B5/B6/B12/VIT C/FOLIC 500-0.5 MG
1 TABLET ORAL
Refills: 0 | DISCHARGE

## 2025-04-30 RX ORDER — ACETAMINOPHEN 500 MG/5ML
1000 LIQUID (ML) ORAL ONCE
Refills: 0 | Status: COMPLETED | OUTPATIENT
Start: 2025-04-30 | End: 2025-04-30

## 2025-04-30 RX ORDER — KETOROLAC TROMETHAMINE 30 MG/ML
30 INJECTION, SOLUTION INTRAMUSCULAR; INTRAVENOUS ONCE
Refills: 0 | Status: DISCONTINUED | OUTPATIENT
Start: 2025-04-30 | End: 2025-04-30

## 2025-04-30 RX ORDER — AMLODIPINE BESYLATE 10 MG/1
1 TABLET ORAL
Refills: 0 | DISCHARGE

## 2025-04-30 RX ORDER — OMEPRAZOLE 20 MG/1
0 CAPSULE, DELAYED RELEASE ORAL
Refills: 1 | DISCHARGE

## 2025-04-30 RX ORDER — ACETAMINOPHEN 500 MG/5ML
650 LIQUID (ML) ORAL EVERY 6 HOURS
Refills: 0 | Status: DISCONTINUED | OUTPATIENT
Start: 2025-04-30 | End: 2025-05-02

## 2025-04-30 RX ADMIN — Medication 400 MILLIGRAM(S): at 20:04

## 2025-04-30 RX ADMIN — Medication 40 MILLIEQUIVALENT(S): at 21:09

## 2025-04-30 RX ADMIN — KETOROLAC TROMETHAMINE 30 MILLIGRAM(S): 30 INJECTION, SOLUTION INTRAMUSCULAR; INTRAVENOUS at 20:04

## 2025-04-30 RX ADMIN — Medication 2000 MILLILITER(S): at 20:06

## 2025-04-30 NOTE — ED ADULT NURSE NOTE - OBJECTIVE STATEMENT
Pt is a 50 y/o male who presents to the EDn with c/o flu like symptoms. Pt endorses  head ache, weakness, congestion, shortness of breath, cough, and "lung pain" for four nights. Patient reports he cannot sleep for four days.

## 2025-04-30 NOTE — ED ADULT NURSE NOTE - NSFALLUNIVINTERV_ED_ALL_ED
Bed/Stretcher in lowest position, wheels locked, appropriate side rails in place/Call bell, personal items and telephone in reach/Instruct patient to call for assistance before getting out of bed/chair/stretcher/Non-slip footwear applied when patient is off stretcher/[place] to call system/Physically safe environment - no spills, clutter or unnecessary equipment/Purposeful proactive rounding/Room/bathroom lighting operational, light cord in reach

## 2025-04-30 NOTE — ED STATDOCS - PROGRESS NOTE DETAILS
Sidle: after chart review pt is on multiple medications. losartan. lexapro , chlorathlithone, amlodipine I, GREGORY Elizondo personally discussed the case with consultant, Dr. Harkins attending hospitalist, about pt who has hyponatremia and will need hospital admission."

## 2025-04-30 NOTE — ED ADULT NURSE NOTE - NS ED NURSE REPORT GIVEN DT
30-Apr-2025 23:00 Picato Counseling:  I discussed with the patient the risks of Picato including but not limited to erythema, scaling, itching, weeping, crusting, and pain.

## 2025-04-30 NOTE — PHARMACOTHERAPY INTERVENTION NOTE - COMMENTS
Medication reconciliation completed.  Reviewed Medication list and confirmed med allergies with patient; confirmed with Dr. First Medmai.

## 2025-04-30 NOTE — PATIENT PROFILE ADULT - FALL HARM RISK - UNIVERSAL INTERVENTIONS
Bed in lowest position, wheels locked, appropriate side rails in place/Call bell, personal items and telephone in reach/Instruct patient to call for assistance before getting out of bed or chair/Non-slip footwear when patient is out of bed/[place] to call system/Physically safe environment - no spills, clutter or unnecessary equipment/Purposeful Proactive Rounding/Room/bathroom lighting operational, light cord in reach

## 2025-04-30 NOTE — ED ADULT NURSE NOTE - PAIN RATING/NUMBER SCALE (0-10): ACTIVITY
Patient brought in by mom for a sore throat as well as ear pain. Mom states she thinks it started Friday.    2 (mild pain)

## 2025-04-30 NOTE — ED STATDOCS - OBJECTIVE STATEMENT
50 y/o M with PMHx of HTN, HLD, gastritis, partial SBO presents to the ED c/o SOB, cough, HA, generalized weakness x4 days. Endorses diaphoresis and nausea. Pt notes he had vomiting but none today. Denies fevers, chills, diarrhea. Pt has not taken Tylenol or Motrin. Notes he has not slept in 4 days. NKDA. Pt had Hyponatremia to 127 on 4/23 at the [facility]. Repeat labs on 4/28 was 136. Discharged

## 2025-04-30 NOTE — ED ADULT TRIAGE NOTE - PRO INTERPRETER NEED 2
[place] Doxycycline Counseling:  Patient counseled regarding possible photosensitivity and increased risk for sunburn.  Patient instructed to avoid sunlight, if possible.  When exposed to sunlight, patients should wear protective clothing, sunglasses, and sunscreen.  The patient was instructed to call the office immediately if the following severe adverse effects occur:  hearing changes, easy bruising/bleeding, severe headache, or vision changes.  The patient verbalized understanding of the proper use and possible adverse effects of doxycycline.  All of the patient's questions and concerns were addressed.

## 2025-04-30 NOTE — ED STATDOCS - CLINICAL SUMMARY MEDICAL DECISION MAKING FREE TEXT BOX
Labs demonstrate CBC within normal limits, CMP with hyponatremia 124, hypokalemia 3.1.  D-dimer is within normal limits, troponin within normal limits, viral swabs were negative.  Chest x-ray was performed independently interpreted myself revealed no acute findings.  Outpatient MAR was reviewed, patient on multiple medications that can cause or contribute.  Patient was admitted to the medicine service for further evaluation management excepted to medicine service by Dr. Harkins.

## 2025-04-30 NOTE — ED ADULT TRIAGE NOTE - CHIEF COMPLAINT QUOTE
Patient presents to the ER with complaints of head ache, weakness, congestion, shortness of breath, cough, and "lung pain" for four nights. Patient reports he cannot sleep for four days. 02 98% and HR 73 in triage. Patient in no acute respiratory distress. Hx: HTN

## 2025-05-01 LAB
ANION GAP SERPL CALC-SCNC: 5 MMOL/L — SIGNIFICANT CHANGE UP (ref 5–17)
ANION GAP SERPL CALC-SCNC: 7 MMOL/L — SIGNIFICANT CHANGE UP (ref 5–17)
BUN SERPL-MCNC: 16 MG/DL — SIGNIFICANT CHANGE UP (ref 7–23)
BUN SERPL-MCNC: 17 MG/DL — SIGNIFICANT CHANGE UP (ref 7–23)
CALCIUM SERPL-MCNC: 8.9 MG/DL — SIGNIFICANT CHANGE UP (ref 8.5–10.1)
CALCIUM SERPL-MCNC: 9.3 MG/DL — SIGNIFICANT CHANGE UP (ref 8.5–10.1)
CHLORIDE SERPL-SCNC: 93 MMOL/L — LOW (ref 96–108)
CHLORIDE SERPL-SCNC: 96 MMOL/L — SIGNIFICANT CHANGE UP (ref 96–108)
CO2 SERPL-SCNC: 27 MMOL/L — SIGNIFICANT CHANGE UP (ref 22–31)
CO2 SERPL-SCNC: 28 MMOL/L — SIGNIFICANT CHANGE UP (ref 22–31)
CREAT SERPL-MCNC: 0.89 MG/DL — SIGNIFICANT CHANGE UP (ref 0.5–1.3)
CREAT SERPL-MCNC: 0.9 MG/DL — SIGNIFICANT CHANGE UP (ref 0.5–1.3)
EGFR: 105 ML/MIN/1.73M2 — SIGNIFICANT CHANGE UP
GLUCOSE SERPL-MCNC: 102 MG/DL — HIGH (ref 70–99)
GLUCOSE SERPL-MCNC: 111 MG/DL — HIGH (ref 70–99)
HCT VFR BLD CALC: 42.2 % — SIGNIFICANT CHANGE UP (ref 39–50)
HGB BLD-MCNC: 15.1 G/DL — SIGNIFICANT CHANGE UP (ref 13–17)
MAGNESIUM SERPL-MCNC: 2.2 MG/DL — SIGNIFICANT CHANGE UP (ref 1.6–2.6)
MCHC RBC-ENTMCNC: 30.1 PG — SIGNIFICANT CHANGE UP (ref 27–34)
MCHC RBC-ENTMCNC: 35.8 G/DL — SIGNIFICANT CHANGE UP (ref 32–36)
MCV RBC AUTO: 84.1 FL — SIGNIFICANT CHANGE UP (ref 80–100)
NRBC # BLD AUTO: 0 K/UL — SIGNIFICANT CHANGE UP (ref 0–0)
NRBC # FLD: 0 K/UL — SIGNIFICANT CHANGE UP (ref 0–0)
NRBC BLD AUTO-RTO: 0 /100 WBCS — SIGNIFICANT CHANGE UP (ref 0–0)
OSMOLALITY SERPL: 271 MOSMOL/KG — LOW (ref 275–300)
OSMOLALITY UR: 277 MOSM/KG — SIGNIFICANT CHANGE UP (ref 50–1200)
PHOSPHATE SERPL-MCNC: 3.1 MG/DL — SIGNIFICANT CHANGE UP (ref 2.5–4.5)
PLATELET # BLD AUTO: 290 K/UL — SIGNIFICANT CHANGE UP (ref 150–400)
PMV BLD: 9.2 FL — SIGNIFICANT CHANGE UP (ref 7–13)
POTASSIUM SERPL-MCNC: 3.4 MMOL/L — LOW (ref 3.5–5.3)
POTASSIUM SERPL-MCNC: 3.8 MMOL/L — SIGNIFICANT CHANGE UP (ref 3.5–5.3)
POTASSIUM SERPL-SCNC: 3.4 MMOL/L — LOW (ref 3.5–5.3)
POTASSIUM SERPL-SCNC: 3.8 MMOL/L — SIGNIFICANT CHANGE UP (ref 3.5–5.3)
RBC # BLD: 5.02 M/UL — SIGNIFICANT CHANGE UP (ref 4.2–5.8)
RBC # FLD: 11.8 % — SIGNIFICANT CHANGE UP (ref 10.3–14.5)
SODIUM SERPL-SCNC: 128 MMOL/L — LOW (ref 135–145)
SODIUM SERPL-SCNC: 128 MMOL/L — LOW (ref 135–145)
SODIUM UR-SCNC: 45 MMOL/L — SIGNIFICANT CHANGE UP
TSH SERPL-MCNC: 3.64 UU/ML — SIGNIFICANT CHANGE UP (ref 0.34–4.82)
WBC # BLD: 7.07 K/UL — SIGNIFICANT CHANGE UP (ref 3.8–10.5)
WBC # FLD AUTO: 7.07 K/UL — SIGNIFICANT CHANGE UP (ref 3.8–10.5)

## 2025-05-01 PROCEDURE — 99233 SBSQ HOSP IP/OBS HIGH 50: CPT

## 2025-05-01 PROCEDURE — 99223 1ST HOSP IP/OBS HIGH 75: CPT | Mod: 25

## 2025-05-01 PROCEDURE — 93010 ELECTROCARDIOGRAM REPORT: CPT

## 2025-05-01 RX ORDER — ASPIRIN 325 MG
81 TABLET ORAL DAILY
Refills: 0 | Status: DISCONTINUED | OUTPATIENT
Start: 2025-05-01 | End: 2025-05-02

## 2025-05-01 RX ORDER — B1/B2/B3/B5/B6/B12/VIT C/FOLIC 500-0.5 MG
1 TABLET ORAL DAILY
Refills: 0 | Status: DISCONTINUED | OUTPATIENT
Start: 2025-05-01 | End: 2025-05-02

## 2025-05-01 RX ORDER — SPIRONOLACTONE 25 MG
50 TABLET ORAL DAILY
Refills: 0 | Status: DISCONTINUED | OUTPATIENT
Start: 2025-05-01 | End: 2025-05-02

## 2025-05-01 RX ORDER — PROPRANOLOL HCL 60 MG
80 TABLET ORAL DAILY
Refills: 0 | Status: DISCONTINUED | OUTPATIENT
Start: 2025-05-01 | End: 2025-05-02

## 2025-05-01 RX ORDER — AMLODIPINE BESYLATE 10 MG/1
5 TABLET ORAL DAILY
Refills: 0 | Status: DISCONTINUED | OUTPATIENT
Start: 2025-05-01 | End: 2025-05-02

## 2025-05-01 RX ORDER — ATORVASTATIN CALCIUM 80 MG/1
10 TABLET, FILM COATED ORAL AT BEDTIME
Refills: 0 | Status: DISCONTINUED | OUTPATIENT
Start: 2025-05-01 | End: 2025-05-02

## 2025-05-01 RX ADMIN — ATORVASTATIN CALCIUM 10 MILLIGRAM(S): 80 TABLET, FILM COATED ORAL at 22:38

## 2025-05-01 RX ADMIN — Medication 40 MILLIGRAM(S): at 06:32

## 2025-05-01 RX ADMIN — Medication 150 MILLILITER(S): at 17:52

## 2025-05-01 RX ADMIN — Medication 1 TABLET(S): at 14:40

## 2025-05-01 RX ADMIN — Medication 80 MILLIGRAM(S): at 08:45

## 2025-05-01 RX ADMIN — Medication 50 MILLIGRAM(S): at 22:38

## 2025-05-01 RX ADMIN — AMLODIPINE BESYLATE 5 MILLIGRAM(S): 10 TABLET ORAL at 08:45

## 2025-05-01 RX ADMIN — Medication 40 MILLIEQUIVALENT(S): at 03:13

## 2025-05-01 RX ADMIN — Medication 250 MILLILITER(S): at 16:55

## 2025-05-01 RX ADMIN — Medication 650 MILLIGRAM(S): at 23:40

## 2025-05-01 RX ADMIN — Medication 40 MILLIEQUIVALENT(S): at 06:33

## 2025-05-01 NOTE — H&P ADULT - HISTORY OF PRESENT ILLNESS
Patient is a 50 y/o M with a PMH of HTN presenting to  ED on 4/30/25 for....    Labs significant for Na of 124.  CXR without radiographic evidence of chest disease.   He received IV Tylenolx 1, IV Toradol 30 mg x 1, PO potassium chloride 40 mEq x 1 and 1L NS bolus.    Patient is a 50 y/o M with a PMH of HTN presenting to  ED on 4/30/25 for difficulty breathing and high blood pressure. He reports starting to have difficulty breathing since yesterday and that today he noted his systolic BP to be in the 160s. He reports compliance with his medications. Says the last time he saw a doctor was 1 week ago and at that time he was started on a new medication (propranolol) for a total of 4 medications for BP control. He also reports having a cough.     Labs significant for Na of 124.CXR without radiographic evidence of chest disease. He received IV Tylenol x 1, IV Toradol 30 mg x 1, PO potassium chloride 40 mEq x 1 and 1L NS bolus.     Currently, he denies fevers, chills, recent travel or close contact with sick individuals. Denies abdominal pain. Mentions vomiting twice since yesterday. Difficulty breathing has improved since arrival to the ED. He mentions having some chest pain which he says is muscular in nature. Denies any diarrhea. Says he feels thirsty throughout the day and while he is at work drinks about 1 gallon of water a day. Has no urinary complaints otherwise. Denies tobacco or alcohol use. Says his mother and grandfather have diabetes.

## 2025-05-01 NOTE — CHART NOTE - NSCHARTNOTEFT_GEN_A_CORE
Rapid response overhead on this Pt at 6:26pm.   Pt was seen at the bedside, reported bleeding from Rt radial access site, guaze was saturated with red blood. RRT was at the site and applying manual compression, bleeding was controlled and hemostasis obtained. Applied manual compression by me for extra 10 min and pressure dressing applied. +2 radial pulse, capillary refill < 2 sec, no hematoma and no further bleeding noted.   Cath team will follow in am.

## 2025-05-01 NOTE — H&P ADULT - NSHPPHYSICALEXAM_GEN_ALL_CORE
Vital Signs Last 24 Hrs  T(C): 37.1 (30 Apr 2025 23:19), Max: 37.1 (30 Apr 2025 23:19)  T(F): 98.7 (30 Apr 2025 23:19), Max: 98.7 (30 Apr 2025 23:19)  HR: 51 (30 Apr 2025 23:19) (51 - 63)  BP: 143/81 (30 Apr 2025 23:19) (131/84 - 143/81)  BP(mean): 112 (30 Apr 2025 18:06) (112 - 112)  RR: 18 (30 Apr 2025 23:19) (16 - 19)  SpO2: 97% (30 Apr 2025 23:19) (97% - 100%)    Parameters below as of 30 Apr 2025 23:19  Patient On (Oxygen Delivery Method): room air Vital Signs Last 24 Hrs  T(C): 37.1 (30 Apr 2025 23:19), Max: 37.1 (30 Apr 2025 23:19)  T(F): 98.7 (30 Apr 2025 23:19), Max: 98.7 (30 Apr 2025 23:19)  HR: 51 (30 Apr 2025 23:19) (51 - 63)  BP: 143/81 (30 Apr 2025 23:19) (131/84 - 143/81)  BP(mean): 112 (30 Apr 2025 18:06) (112 - 112)  RR: 18 (30 Apr 2025 23:19) (16 - 19)  SpO2: 97% (30 Apr 2025 23:19) (97% - 100%)    Parameters below as of 30 Apr 2025 23:19  Patient On (Oxygen Delivery Method): room air    GENERAL: NAD, lying in bed comfortably  HEAD:  Atraumatic, Normocephalic  EYES: EOMI  ENT: Moist mucous membranes  NECK: Supple  CHEST/LUNG: Unlabored respirations  HEART: Regular rate and rhythm  ABDOMEN: Soft, Nontender  EXTREMITIES:  No clubbing, cyanosis, or edema  NERVOUS SYSTEM:  Alert & Oriented X3, speech clear. No deficits   MSK: FROM all 4 extremities

## 2025-05-01 NOTE — PACU DISCHARGE NOTE - COMMENTS
report given to Kristie MERCADO, pt at baseline vital signs with in normal limits. post cath discharge instructions rendered via teach back and pt verbalizes understanding. cell phone with pt. pt transported to floor, safety manitained .

## 2025-05-01 NOTE — BRIEF OPERATIVE NOTE - NSICDXBRIEFPOSTOP_GEN_ALL_CORE_FT
POST-OP DIAGNOSIS:  Nonobstructive atherosclerosis of coronary artery 01-May-2025 17:19:03  Rolando Brady

## 2025-05-01 NOTE — PROGRESS NOTE ADULT - ASSESSMENT
#Hyponatremia - medication induced  -  Upon admission with Na of 124 -> 128  - s/p 1L NS bolus in the ED increased to 128   -  Patient is on propranolol, amlodipine, chlorthalidone and losartan for HTN   - Hyponatremia is likely due to the combination of these medications, particularly chlorthalidone and losartan   --> Will hold off on chlorthalidone and losartan -> start spironolactone 50 po qd and stop chlorthalidone  - F/u urine and serum osmolality   - F/u urine Na+ concentration   - F/u TSH and cortisol levels       #Chest pain  - EKG ordered this am,early repol, no stt changes  - [facility] d/w his primary cardiologist  - recc: NPO a MN for NST as inpt per cardiology, pt high risk for ischemia given age, race, abnl stress test and ongoing angina symtpoms with uncontrolled htn despite 4 medications  - ASA + statin  - echo from 2024 reviewed    #Hypokalemia   - Upon admission 3.1 , resolved  - s/p repletion   - F/u AM BMP     #Eosinophilia   - Upon admission with eosinophil % of 9.5 (was previously 11.8% in Feb 2025)  - Denies hx of asthma or atopic dermatitis   - Continue to monitor     #HTN   - At home regimen: losartan 100 mg, propranolol 80 mg, amlodipine 5 mg QD and chlorthalidone 25 mg   - Continue amlodipine and propranolol only in light of hyponatremia   - Would like to order US duplex of kidneys to evaluate for renal artery stenosis as he is on four different classes of BP meds     #DVT ppx   - Low risk   - Encourage ambulation    #Hyponatremia - medication induced  -  Upon admission with Na of 124 -> 128  - s/p 1L NS bolus in the ED increased to 128   -  Patient is on propranolol, amlodipine, chlorthalidone and losartan for HTN   - Hyponatremia is likely due to the combination of these medications, particularly chlorthalidone and losartan   --> Will hold off on chlorthalidone and losartan -> start spironolactone 50 po qd and stop chlorthalidone  - F/u urine and serum osmolality   - F/u urine Na+ concentration   - F/u TSH and cortisol levels   -BMP ordered for am to eval for ongoing hypokalemia, add mag level      #Chest pain r/o ACS  - EKG ordered this am,early repol, no stt changes  - C d/w his primary cardiologist  - recc: NPO a MN for NST as inpt per cardiology, pt high risk for ischemia given age, race, abnl stress test and ongoing angina symtpoms with uncontrolled htn despite 4 medications  - ASA + statin ordered  - echo from 2024 reviewed  -    #Hypokalemia   - Upon admission 3.1 , resolved  - s/p repletion   - F/u AM BMP     #Eosinophilia   - Upon admission with eosinophil % of 9.5 (was previously 11.8% in Feb 2025)  - Denies hx of asthma or atopic dermatitis   - Continue to monitor     #HTN   - At home regimen: losartan 100 mg, propranolol 80 mg, amlodipine 5 mg QD and chlorthalidone 25 mg   - Continue amlodipine and propranolol only in light of hyponatremia   - Would like to order US duplex of kidneys to evaluate for renal artery stenosis as he is on four different classes of BP meds     #DVT ppx   - Low risk   - Encourage ambulation

## 2025-05-01 NOTE — PROGRESS NOTE ADULT - SUBJECTIVE AND OBJECTIVE BOX
HPI:  Patient is a 50 y/o M with a PMH of HTN presenting to  ED on 4/30/25 for difficulty breathing and high blood pressure. He reports starting to have difficulty breathing since yesterday and that today he noted his systolic BP to be in the 160s. He reports compliance with his medications. Says the last time he saw a doctor was 1 week ago and at that time he was started on a new medication (propranolol) for a total of 4 medications for BP control. He also reports having a cough.   Labs significant for Na of 124.CXR without radiographic evidence of chest disease. He received IV Tylenol x 1, IV Toradol 30 mg x 1, PO potassium chloride 40 mEq x 1 and 1L NS bolus.   Currently, he denies fevers, chills, recent travel or close contact with sick individuals. Denies abdominal pain. Mentions vomiting twice since yesterday. Difficulty breathing has improved since arrival to the ED. He mentions having some chest pain which he says is muscular in nature. Denies any diarrhea. Says he feels thirsty throughout the day and while he is at work drinks about 1 gallon of water a day. Has no urinary complaints otherwise. Denies tobacco or alcohol use. Says his mother and grandfather have diabetes.   Reported s/p LHC in 2017- microvascular disease, outpt exercise stress test was abnormal   Pt referred to cardiac cath today.     Pt underwent LHC via Rt radial access, revealed non-obstructive CAD     ROS: denies chest pain/ pressure, SOB or palpitation     Vital Signs;  T(C): 36.7 (05-01-25 @ 14:54), Max: 37.1 (04-30-25 @ 23:19)  HR: 63 (05-01-25 @ 17:30) (51 - 86)  BP: 140/84 (05-01-25 @ 17:00) (121/76 - 143/98)  RR: 16 (05-01-25 @ 17:30) (15 - 19)  SpO2: 98% (05-01-25 @ 17:30) (97% - 100%)    PHYSICAL EXAM:  GENERAL: NAD, well-groomed, well-developed  HEENT - NC/AT, pupils equal and reactive to light,  ; Moist mucous membranes, Good dentition, No lesions  NECK: Supple, No JVD  CHEST/LUNG: Clear to auscultation bilaterally; No rales, rhonchi, wheezing  HEART: Regular rate and rhythm; No murmurs, rubs, or gallops  ABDOMEN: Soft, Nontender, Nondistended; Bowel sounds present  EXTREMITIES:  2+ Peripheral Pulses, No clubbing, cyanosis, or edema  NEURO:  No Focal deficits, sensory and motor intact  SKIN: No rashes or lesions  Access site: Rt radial artery access with radial band, no hematoma or bleeding, + 2 radial pulses, capillary refill < 2 sec     LABS: All Labs Reviewed:                        15.1   7.07  )-----------( 290      ( 01 May 2025 06:45 )             42.2     05-01    128[L]  |  96  |  17  ----------------------------<  102[H]  3.8   |  27  |  0.90    Ca    9.3      01 May 2025 06:45  Phos  3.1     05-01  Mg     2.2     05-01    TPro  7.1  /  Alb  3.7  /  TBili  0.6  /  DBili  x   /  AST  27  /  ALT  52  /  AlkPhos  75  04-30      Cardiac Cath: pending official report     Medications:  acetaminophen     Tablet .. 650 milliGRAM(s) Oral every 6 hours PRN  amLODIPine   Tablet 5 milliGRAM(s) Oral daily  aspirin  chewable 81 milliGRAM(s) Oral daily  atorvastatin 10 milliGRAM(s) Oral at bedtime  multivitamin 1 Tablet(s) Oral daily  pantoprazole    Tablet 40 milliGRAM(s) Oral before breakfast  propranolol LA 80 milliGRAM(s) Oral daily  spironolactone 50 milliGRAM(s) Oral daily      Assessment/ Plan  50 y/o M with a PMH of HTN, microvascular coronary artery disease (s/p LHC in 2017) with recent abnormal stress test presenting to  ED with difficulty breathing and high blood pressure, underwent  LHC, which found to have non-obstructive CAD   - return to tele monitor   - post IV hydration: NS 150cc/hr x4 hrs   - continue ASA 81 mg daily   - continue BB, spironolactone   - continue statin  - post procedure, outcome and follow up care reviewed with patient and Dr. Aranda   - continue rest of care by primary team   - follow up with Dr. Gifford in am     Discussed the plan with Dr. Aranda, Pt and Cath RN. 
  Patient is a 48 y/o M with a PMH of HTN presenting to  ED on 4/30/25 for difficulty breathing and high blood pressure. He reports starting to have difficulty breathing since yesterday and that today he noted his systolic BP to be in the 160s. He reports compliance with his medications. Says the last time he saw a doctor was 1 week ago and at that time he was started on a new medication (propranolol) for a total of 4 medications for BP control. He also reports having a cough.     Labs significant for Na of 124.CXR without radiographic evidence of chest disease. He received IV Tylenol x 1, IV Toradol 30 mg x 1, PO potassium chloride 40 mEq x 1 and 1L NS bolus.     Currently, he denies fevers, chills, recent travel or close contact with sick individuals. Denies abdominal pain. Mentions vomiting twice since yesterday. Difficulty breathing has improved since arrival to the ED. He mentions having some chest pain which he says is muscular in nature. Denies any diarrhea. Says he feels thirsty throughout the day and while he is at work drinks about 1 gallon of water a day. Has no urinary complaints otherwise. Denies tobacco or alcohol use. Says his mother and grandfather have diabetes.       sub: +CP,  BP improved. DW his primary cardiologist, had abnl LHC in 2017 with microvascular disease. Abnl o/p exercise stress test. NP / cardio recently started chlorthalidone.  Hypoaldosterone testing negative as o/p.           ICU Vital Signs Last 24 Hrs  T(C): 36.8 (01 May 2025 08:31), Max: 37.1 (30 Apr 2025 23:19)  T(F): 98.2 (01 May 2025 08:31), Max: 98.7 (30 Apr 2025 23:19)  HR: 60 (01 May 2025 08:31) (51 - 63)  BP: 143/98 (01 May 2025 08:31) (131/84 - 143/98)  BP(mean): 112 (30 Apr 2025 18:06) (112 - 112)  ABP: --  ABP(mean): --  RR: 18 (01 May 2025 08:31) (16 - 19)  SpO2: 100% (01 May 2025 08:31) (97% - 100%)    O2 Parameters below as of 01 May 2025 08:31  Patient On (Oxygen Delivery Method): room air                      I&O's Summary    30 Apr 2025 07:01  -  01 May 2025 07:00  --------------------------------------------------------  IN: 0 mL / OUT: 1000 mL / NET: -1000 mL    01 May 2025 07:01  -  01 May 2025 11:34  --------------------------------------------------------  IN: 0 mL / OUT: 600 mL / NET: -600 mL        CAPILLARY BLOOD GLUCOSE          PHYSICAL EXAM:    Constitutional: NAD, awake and alert,   HEENT: PERR, EOMI, Normal Hearing, MMM  Neck: Soft and supple, No LAD, No JVD  Respiratory: Breath sounds are clear bilaterally, No wheezing, rales or rhonchi  Cardiovascular: S1 and S2, regular rate and rhythm, no Murmurs, gallops or rubs  Gastrointestinal: Bowel Sounds present, soft, nontender, nondistended, no guarding, no rebound  Extremities: No peripheral edema  Vascular: 2+ peripheral pulses  Neurological: A/O x 3, no focal deficits  Musculoskeletal: 5/5 strength b/l upper and lower extremities  Skin: No rashes    MEDICATIONS:  MEDICATIONS  (STANDING):  amLODIPine   Tablet 5 milliGRAM(s) Oral daily  multivitamin 1 Tablet(s) Oral daily  pantoprazole    Tablet 40 milliGRAM(s) Oral before breakfast  propranolol LA 80 milliGRAM(s) Oral daily  spironolactone 50 milliGRAM(s) Oral daily      LABS: All Labs Reviewed:                        15.1   7.07  )-----------( 290      ( 01 May 2025 06:45 )             42.2     05-01    128[L]  |  96  |  17  ----------------------------<  102[H]  3.8   |  27  |  0.90    Ca    9.3      01 May 2025 06:45  Phos  3.1     05-01  Mg     2.2     05-01    TPro  7.1  /  Alb  3.7  /  TBili  0.6  /  DBili  x   /  AST  27  /  ALT  52  /  AlkPhos  75  04-30              Blood Culture:     RADIOLOGY/EKG: reviewed

## 2025-05-01 NOTE — H&P ADULT - ASSESSMENT
#Hyponatremia   - Upon admission with Na of 124   - s/p 1L NS bolus in the ED increased to 128   -  Patient is on amlodipine, chlorthalidone and losartan for HTN   - Hyponatremia is likely due to the combination of these medications, particularly chlorthalidone and losartan   --> Will hold off on chlorthalidone and losartan   - Continue amlodipine 5 mg only for now   - F/u urine and serum osmolality   - F/u urine Na+ concentration   - F/u TSH and cortisol levels     #Hypokalemia   - Upon admission 3.1   - s/p repletion   - F/u AM BMP     #Eosinophilia   - Upon admission with eosinophil % of 9.5 (was previously 11.8% in Feb 2025)  - Denies hx of asthma or atopic dermatitis   - Continue to monitor     #HTN   - At home regimen: losartan 100 mg, propranolol 80 mg, amlodipine 5 mg QD and chlorthalidone 25 mg   - Continue amlodipine and propranolol only in light of hyponatremia   - Would like to order US duplex of kidneys to evaluate for renal artery stenosis as he is on four different classes of BP meds     #DVT ppx   - Low risk   - Encourage ambulation    #Hyponatremia   -  Upon admission with Na of 124   - s/p 1L NS bolus in the ED increased to 128   -  Patient is on propranolol, amlodipine, chlorthalidone and losartan for HTN   - Hyponatremia is likely due to the combination of these medications, particularly chlorthalidone and losartan   --> Will hold off on chlorthalidone and losartan   - F/u urine and serum osmolality   - F/u urine Na+ concentration   - F/u TSH and cortisol levels     #Hypokalemia   - Upon admission 3.1   - s/p repletion   - F/u AM BMP     #Eosinophilia   - Upon admission with eosinophil % of 9.5 (was previously 11.8% in Feb 2025)  - Denies hx of asthma or atopic dermatitis   - Continue to monitor     #HTN   - At home regimen: losartan 100 mg, propranolol 80 mg, amlodipine 5 mg QD and chlorthalidone 25 mg   - Continue amlodipine and propranolol only in light of hyponatremia   - Would like to order US duplex of kidneys to evaluate for renal artery stenosis as he is on four different classes of BP meds     #DVT ppx   - Low risk   - Encourage ambulation

## 2025-05-02 ENCOUNTER — RESULT REVIEW (OUTPATIENT)
Age: 50
End: 2025-05-02

## 2025-05-02 ENCOUNTER — TRANSCRIPTION ENCOUNTER (OUTPATIENT)
Age: 50
End: 2025-05-02

## 2025-05-02 VITALS
OXYGEN SATURATION: 98 % | HEART RATE: 65 BPM | RESPIRATION RATE: 18 BRPM | DIASTOLIC BLOOD PRESSURE: 83 MMHG | TEMPERATURE: 98 F | SYSTOLIC BLOOD PRESSURE: 133 MMHG

## 2025-05-02 LAB
A1C WITH ESTIMATED AVERAGE GLUCOSE RESULT: 5.9 % — HIGH (ref 4–5.6)
ANION GAP SERPL CALC-SCNC: 4 MMOL/L — LOW (ref 5–17)
BUN SERPL-MCNC: 14 MG/DL — SIGNIFICANT CHANGE UP (ref 7–23)
CALCIUM SERPL-MCNC: 9 MG/DL — SIGNIFICANT CHANGE UP (ref 8.5–10.1)
CHLORIDE SERPL-SCNC: 101 MMOL/L — SIGNIFICANT CHANGE UP (ref 96–108)
CHOLEST SERPL-MCNC: 207 MG/DL — HIGH
CO2 SERPL-SCNC: 28 MMOL/L — SIGNIFICANT CHANGE UP (ref 22–31)
CORTIS AM PEAK SERPL-MCNC: 11.8 UG/DL — SIGNIFICANT CHANGE UP (ref 6–18.4)
CREAT SERPL-MCNC: 0.88 MG/DL — SIGNIFICANT CHANGE UP (ref 0.5–1.3)
EGFR: 105 ML/MIN/1.73M2 — SIGNIFICANT CHANGE UP
EGFR: 105 ML/MIN/1.73M2 — SIGNIFICANT CHANGE UP
ESTIMATED AVERAGE GLUCOSE: 123 MG/DL — HIGH (ref 68–114)
GLUCOSE SERPL-MCNC: 106 MG/DL — HIGH (ref 70–99)
HCT VFR BLD CALC: 42.3 % — SIGNIFICANT CHANGE UP (ref 39–50)
HDLC SERPL-MCNC: 61 MG/DL — SIGNIFICANT CHANGE UP
HGB BLD-MCNC: 14.9 G/DL — SIGNIFICANT CHANGE UP (ref 13–17)
LDLC SERPL-MCNC: 127 MG/DL — HIGH
LIPID PNL WITH DIRECT LDL SERPL: 127 MG/DL — HIGH
MCHC RBC-ENTMCNC: 30.2 PG — SIGNIFICANT CHANGE UP (ref 27–34)
MCHC RBC-ENTMCNC: 35.2 G/DL — SIGNIFICANT CHANGE UP (ref 32–36)
MCV RBC AUTO: 85.6 FL — SIGNIFICANT CHANGE UP (ref 80–100)
NONHDLC SERPL-MCNC: 146 MG/DL — HIGH
NRBC # BLD AUTO: 0 K/UL — SIGNIFICANT CHANGE UP (ref 0–0)
NRBC # FLD: 0 K/UL — SIGNIFICANT CHANGE UP (ref 0–0)
NRBC BLD AUTO-RTO: 0 /100 WBCS — SIGNIFICANT CHANGE UP (ref 0–0)
PLATELET # BLD AUTO: 269 K/UL — SIGNIFICANT CHANGE UP (ref 150–400)
PMV BLD: 9 FL — SIGNIFICANT CHANGE UP (ref 7–13)
POTASSIUM SERPL-MCNC: 3.8 MMOL/L — SIGNIFICANT CHANGE UP (ref 3.5–5.3)
POTASSIUM SERPL-SCNC: 3.8 MMOL/L — SIGNIFICANT CHANGE UP (ref 3.5–5.3)
RBC # BLD: 4.94 M/UL — SIGNIFICANT CHANGE UP (ref 4.2–5.8)
RBC # FLD: 12.1 % — SIGNIFICANT CHANGE UP (ref 10.3–14.5)
SODIUM SERPL-SCNC: 133 MMOL/L — LOW (ref 135–145)
TRIGL SERPL-MCNC: 107 MG/DL — SIGNIFICANT CHANGE UP
WBC # BLD: 7.72 K/UL — SIGNIFICANT CHANGE UP (ref 3.8–10.5)
WBC # FLD AUTO: 7.72 K/UL — SIGNIFICANT CHANGE UP (ref 3.8–10.5)

## 2025-05-02 PROCEDURE — 93018 CV STRESS TEST I&R ONLY: CPT

## 2025-05-02 PROCEDURE — 99239 HOSP IP/OBS DSCHRG MGMT >30: CPT

## 2025-05-02 PROCEDURE — 93016 CV STRESS TEST SUPVJ ONLY: CPT

## 2025-05-02 RX ORDER — ALPRAZOLAM 0.5 MG
0.25 TABLET, EXTENDED RELEASE 24 HR ORAL ONCE
Refills: 0 | Status: DISCONTINUED | OUTPATIENT
Start: 2025-05-02 | End: 2025-05-02

## 2025-05-02 RX ORDER — ATORVASTATIN CALCIUM 80 MG/1
1 TABLET, FILM COATED ORAL
Qty: 30 | Refills: 0
Start: 2025-05-02 | End: 2025-05-31

## 2025-05-02 RX ORDER — CARVEDILOL 3.12 MG/1
1 TABLET, FILM COATED ORAL
Qty: 60 | Refills: 0
Start: 2025-05-02 | End: 2025-05-31

## 2025-05-02 RX ORDER — ALPRAZOLAM 0.5 MG
1 TABLET, EXTENDED RELEASE 24 HR ORAL
Qty: 5 | Refills: 0
Start: 2025-05-02 | End: 2025-05-06

## 2025-05-02 RX ORDER — LOSARTAN POTASSIUM 100 MG/1
1 TABLET, FILM COATED ORAL
Refills: 0 | DISCHARGE

## 2025-05-02 RX ORDER — PROPRANOLOL HCL 60 MG
1 TABLET ORAL
Refills: 0 | DISCHARGE

## 2025-05-02 RX ORDER — ASPIRIN 325 MG
1 TABLET ORAL
Qty: 0 | Refills: 0 | DISCHARGE
Start: 2025-05-02

## 2025-05-02 RX ORDER — SPIRONOLACTONE 25 MG
2 TABLET ORAL
Qty: 60 | Refills: 0
Start: 2025-05-02 | End: 2025-05-31

## 2025-05-02 RX ORDER — CHLORTHALIDONE 25 MG/1
1 TABLET ORAL
Refills: 0 | DISCHARGE

## 2025-05-02 RX ADMIN — Medication 0.25 MILLIGRAM(S): at 10:10

## 2025-05-02 RX ADMIN — Medication 650 MILLIGRAM(S): at 00:30

## 2025-05-02 RX ADMIN — Medication 80 MILLIGRAM(S): at 09:24

## 2025-05-02 RX ADMIN — Medication 650 MILLIGRAM(S): at 10:30

## 2025-05-02 RX ADMIN — Medication 1 TABLET(S): at 10:10

## 2025-05-02 RX ADMIN — Medication 50 MILLIGRAM(S): at 09:23

## 2025-05-02 RX ADMIN — Medication 81 MILLIGRAM(S): at 10:10

## 2025-05-02 RX ADMIN — Medication 40 MILLIGRAM(S): at 05:27

## 2025-05-02 RX ADMIN — Medication 650 MILLIGRAM(S): at 11:00

## 2025-05-02 RX ADMIN — AMLODIPINE BESYLATE 5 MILLIGRAM(S): 10 TABLET ORAL at 09:22

## 2025-05-02 NOTE — DISCHARGE NOTE PROVIDER - NSDCMRMEDTOKEN_GEN_ALL_CORE_FT
amLODIPine 5 mg oral tablet: 1 tab(s) orally once a day  aspirin 81 mg oral tablet, chewable: 1 tab(s) orally once a day  atorvastatin 10 mg oral tablet: 1 tab(s) orally once a day (at bedtime)  Coreg 12.5 mg oral tablet: 1 tab(s) orally 2 times a day  Multiple Vitamins oral tablet: 1 tab(s) orally once a day  pantoprazole 40 mg oral delayed release tablet: 1 tab(s) orally once a day (in the evening)  spironolactone 25 mg oral tablet: 2 tab(s) orally once a day  Xanax 0.25 mg oral tablet: 1 tab(s) orally once a day as needed for anxiety MDD: 1   amLODIPine 5 mg oral tablet: 1 tab(s) orally once a day  aspirin 81 mg oral tablet, chewable: 1 tab(s) orally once a day  atorvastatin 10 mg oral tablet: 1 tab(s) orally once a day (at bedtime)  Coreg 12.5 mg oral tablet: 1 tab(s) orally 2 times a day  Lipitor 10 mg oral tablet: 1 tab(s) orally once a day  Multiple Vitamins oral tablet: 1 tab(s) orally once a day  pantoprazole 40 mg oral delayed release tablet: 1 tab(s) orally once a day (in the evening)  spironolactone 25 mg oral tablet: 2 tab(s) orally once a day  Xanax 0.25 mg oral tablet: 1 tab(s) orally once a day as needed for anxiety MDD: 1

## 2025-05-02 NOTE — DISCHARGE NOTE NURSING/CASE MANAGEMENT/SOCIAL WORK - NSDCVIVACCINE_GEN_ALL_CORE_FT
influenza, injectable, quadrivalent, preservative free; 25-Sep-2017 15:06; Sarahy Ram (RN); [employer]; [id number]; IntraMuscular; Deltoid Left.; 0.5 milliLiter(s); VIS (VIS Published: 07-Aug-2015, VIS Presented: 25-Sep-2017);

## 2025-05-02 NOTE — DISCHARGE NOTE PROVIDER - HOSPITAL COURSE
Patient is a 50 y/o M with a PMH of HTN presenting to  ED on 4/30/25 for difficulty breathing and high blood pressure. He reports starting to have difficulty breathing since yesterday and that today he noted his systolic BP to be in the 160s. He reports compliance with his medications. Says the last time he saw a doctor was 1 week ago and at that time he was started on a new medication (propranolol) for a total of 4 medications for BP control. He also reports having a cough.     Labs significant for Na of 124.CXR without radiographic evidence of chest disease. He received IV Tylenol x 1, IV Toradol 30 mg x 1, PO potassium chloride 40 mEq x 1 and 1L NS bolus.     Currently, he denies fevers, chills, recent travel or close contact with sick individuals. Denies abdominal pain. Mentions vomiting twice since yesterday. Difficulty breathing has improved since arrival to the ED. He mentions having some chest pain which he says is muscular in nature. Denies any diarrhea. Says he feels thirsty throughout the day and while he is at work drinks about 1 gallon of water a day. Has no urinary complaints otherwise. Denies tobacco or alcohol use. Says his mother and grandfather have diabetes.       sub: +CP,  BP improved. DW his primary cardiologist, had abnl LHC in 2017 with microvascular disease. Abnl o/p exercise stress test. NP / cardio recently started chlorthalidone.  Hypoaldosterone testing negative as o/p.     RRT: for elevated bp after nuclear isotope was given. NST was not discontinued after cardiology felt the study was futile and LHC was carried out in its stead late yesterday afternoon.     PHYSICAL EXAM:    Constitutional: NAD, awake and alert,   HEENT: PERR, EOMI, Normal Hearing, MMM  Neck: Soft and supple, No LAD, No JVD  Respiratory: Breath sounds are clear bilaterally, No wheezing, rales or rhonchi  Cardiovascular: S1 and S2, regular rate and rhythm, no Murmurs, gallops or rubs  Gastrointestinal: Bowel Sounds present, soft, nontender, nondistended, no guarding, no rebound  Extremities: No peripheral edema  Vascular: 2+ peripheral pulses  Neurological: A/O x 3, no focal deficits  Musculoskeletal: 5/5 strength b/l upper and lower extremities  Skin: No rashes  LABS: All Labs Reviewed:RADIOLOGY/EKG: reviewedAssessment and Plan:   · Assessment    #Hyponatremia - medication induced  -  Upon admission with Na of 124 -> 128  - s/p 1L NS bolus in the ED increased to 128   -  Patient is on propranolol, amlodipine, chlorthalidone and losartan for HTN   - Hyponatremia is likely due to the combination of these medications, particularly chlorthalidone and losartan   --> Will hold off on chlorthalidone and losartan -> start spironolactone 50 po qd and stop chlorthalidone  - F/u urine and serum osmolality   - F/u urine Na+ concentration   - F/u TSH and cortisol levels   -BMP ordered for am to eval for ongoing hypokalemia, add mag level      #Chest pain r/o ACS  - EKG ordered this am,early repol, no stt changes  - ASA + statin ordered  - echo from 2024 reviewed  - pt underwent LHC late yesterday afternoon per cardiology. NST was planned originally after d/w Dr Bond however it was felt that he had high prob for false negative and LHC was decided and ordered by cardiology. Non obstructive changes    #Hypokalemia   - Upon admission 3.1 , resolved  - s/p repletion   - F/u AM BMP     #Eosinophilia   - Upon admission with eosinophil % of 9.5 (was previously 11.8% in Feb 2025)  - Denies hx of asthma or atopic dermatitis       #HTN   - At home regimen: losartan 100 mg, propranolol 80 mg, amlodipine 5 mg QD and chlorthalidone 25 mg   - plan to srop chlorthalidone, propranolol and losartan given his side effects  - will switch to norvasc 5, coreg 12.5 bid and sprironolactone 50 po qd. D/W cardiology and will need close follow up appt for bp check  - Would like to order US duplex of kidneys to evaluate for renal artery stenosis as he is on four different classes of BP meds   - hypoaldosteronism ratio/study negative per his cardiologist    dc time: 51 min

## 2025-05-02 NOTE — PROVIDER CONTACT NOTE (CHANGE IN STATUS NOTIFICATION) - SITUATION
Pt on stretcher returning from stress test when pt developed diaphoresis, dizziness and headache. Code rapid response was called by Cardiac RN transferring patient back to unit.

## 2025-05-02 NOTE — DISCHARGE NOTE NURSING/CASE MANAGEMENT/SOCIAL WORK - PATIENT PORTAL LINK FT
You can access the FollowMyHealth Patient Portal offered by [facility] by registering at the following website: [email or web address]. By joining [facility]’s FollowMyHealth portal, you will also be able to view your health information using other applications (apps) compatible with our system.

## 2025-05-02 NOTE — DISCHARGE NOTE PROVIDER - PROVIDER RX CONTACT NUMBER
[phone] Winlevi Counseling:  I discussed with the patient the risks of topical clascoterone including but not limited to erythema, scaling, itching, and stinging. Patient voiced their understanding.

## 2025-05-02 NOTE — PROVIDER CONTACT NOTE (CHANGE IN STATUS NOTIFICATION) - DATE AND TIME:
02-May-2025 09:18
,[email or web address] [email or web address]

## 2025-05-02 NOTE — DISCHARGE NOTE NURSING/CASE MANAGEMENT/SOCIAL WORK - NSDCPEFALRISK_GEN_ALL_CORE
For information on Fall & Injury Prevention, visit: [email or web address] OR  [email or web address] OR  https://www.cdc.gov/steadi/patient.html

## 2025-05-02 NOTE — DISCHARGE NOTE NURSING/CASE MANAGEMENT/SOCIAL WORK - FINANCIAL ASSISTANCE
[facility] provides services at a reduced cost to those who are determined to be eligible through [facility]’s financial assistance program. Information regarding [facility]’s financial assistance program can be found by going to [email or web address] or by calling [phone].

## 2025-05-02 NOTE — CHART NOTE - NSCHARTNOTEFT_GEN_A_CORE
CC:  called by Nuclear stress Rn report patient received radioisotope and is s/p LHC last evening reporting normal cors.     HPI:   HPI:  Patient is a 48 y/o M with a PMH of HTN presenting to  ED on 4/30/25 for difficulty breathing and high blood pressure. He reports starting to have difficulty breathing since yesterday and that today he noted his systolic BP to be in the 160s. He reports compliance with his medications. Says the last time he saw a doctor was 1 week ago and at that time he was started on a new medication (propranolol) for a total of 4 medications for BP control. He also reports having a cough.     Labs significant for Na of 124.CXR without radiographic evidence of chest disease. He received IV Tylenol x 1, IV Toradol 30 mg x 1, PO potassium chloride 40 mEq x 1 and 1L NS bolus.     Currently, he denies fevers, chills, recent travel or close contact with sick individuals. Denies abdominal pain. Mentions vomiting twice since yesterday. Difficulty breathing has improved since arrival to the ED. He mentions having some chest pain which he says is muscular in nature. Denies any diarrhea. Says he feels thirsty throughout the day and while he is at work drinks about 1 gallon of water a day. Has no urinary complaints otherwise. Denies tobacco or alcohol use. Says his mother and grandfather have diabetes.        (01 May 2025 02:18)    Patient was taken last night cath lab Operative Findings  s/p [facility]: nonobstructive CAD, LVEDP 7 mmHg        Vital Signs Last 24 Hrs  T(C): 36.7 (02 May 2025 07:19), Max: 36.9 (01 May 2025 23:55)  T(F): 98.1 (02 May 2025 07:19), Max: 98.5 (01 May 2025 23:55)  HR: 60 (02 May 2025 07:19) (58 - 90)  BP: 131/81 (02 May 2025 07:19) (100/74 - 140/99)  BP(mean): --  RR: 20 (02 May 2025 07:19) (15 - 20)  SpO2: 99% (02 May 2025 07:19) (97% - 100%)    Parameters below as of 02 May 2025 07:19  Patient On (Oxygen Delivery Method): room air                              14.9   7.72  )-----------( 269      ( 02 May 2025 06:40 )             42.3       05-02    133[L]  |  101  |  14  ----------------------------<  106[H]  3.8   |  28  |  0.88    Ca    9.0      02 May 2025 06:40  Phos  3.1     05-01  Mg     2.2     05-01    TPro  7.1  /  Alb  3.7  /  TBili  0.6  /  DBili  x   /  AST  27  /  ALT  52  /  AlkPhos  75  04-30              PHYSICAL EXAM  GENERAL: NAD, AAOx3  CHEST/LUNG: Clear to auscultation bilaterally; No wheeze  HEART: s1 s2 Regular rate and rhythm; No murmurs, rubs, or gallops  ABDOMEN: Soft, Nontender, Nondistended; Bowel sounds present X 4 quadrants   EXTREMITIES:  2+ Peripheral Pulses, No clubbing, cyanosis, or edema  SKIN: No rashes or lesions,  b/l LE not red, cool to touch,  no open skin no drainage  NEURO: nonfocal CN/motor/sensory/reflexes  Psych: normal affect and behavior, calm and cooperative         ASSESSMENT/PLAN:     1. CC:  called by Nuclear stress Rn report patient received radioisotope and is s/p LHC last evening reporting normal cors.     HPI:   HPI:  Patient is a 48 y/o M with a PMH of HTN presenting to  ED on 4/30/25 for difficulty breathing and high blood pressure. He reports starting to have difficulty breathing since yesterday and that today he noted his systolic BP to be in the 160s. He reports compliance with his medications. Says the last time he saw a doctor was 1 week ago and at that time he was started on a new medication (propranolol) for a total of 4 medications for BP control. He also reports having a cough.     Labs significant for Na of 124.CXR without radiographic evidence of chest disease. He received IV Tylenol x 1, IV Toradol 30 mg x 1, PO potassium chloride 40 mEq x 1 and 1L NS bolus.     Currently, he denies fevers, chills, recent travel or close contact with sick individuals. Denies abdominal pain. Mentions vomiting twice since yesterday. Difficulty breathing has improved since arrival to the ED. He mentions having some chest pain which he says is muscular in nature. Denies any diarrhea. Says he feels thirsty throughout the day and while he is at work drinks about 1 gallon of water a day. Has no urinary complaints otherwise. Denies tobacco or alcohol use. Says his mother and grandfather have diabetes.        (01 May 2025 02:18)    Patient was taken last night cath lab Operative Findings  s/p [facility]: nonobstructive CAD, LVEDP 7 mmHg        Vital Signs Last 24 Hrs  T(C): 36.7 (02 May 2025 07:19), Max: 36.9 (01 May 2025 23:55)  T(F): 98.1 (02 May 2025 07:19), Max: 98.5 (01 May 2025 23:55)  HR: 60 (02 May 2025 07:19) (58 - 90)  BP: 131/81 (02 May 2025 07:19) (100/74 - 140/99)  BP(mean): --  RR: 20 (02 May 2025 07:19) (15 - 20)  SpO2: 99% (02 May 2025 07:19) (97% - 100%)    Parameters below as of 02 May 2025 07:19  Patient On (Oxygen Delivery Method): room air                              14.9   7.72  )-----------( 269      ( 02 May 2025 06:40 )             42.3       05-02    133[L]  |  101  |  14  ----------------------------<  106[H]  3.8   |  28  |  0.88    Ca    9.0      02 May 2025 06:40  Phos  3.1     05-01  Mg     2.2     05-01    TPro  7.1  /  Alb  3.7  /  TBili  0.6  /  DBili  x   /  AST  27  /  ALT  52  /  AlkPhos  75  04-30              PHYSICAL EXAM  GENERAL: NAD, AAOx3  CHEST/LUNG: Clear to auscultation bilaterally; No wheeze  HEART: s1 s2 Regular rate and rhythm; No murmurs, rubs, or gallops  ABDOMEN: Soft, Nontender, Nondistended; Bowel sounds present X 4 quadrants   EXTREMITIES:  2+ Peripheral Pulses, No clubbing, cyanosis, or edema  SKIN: No rashes or lesions,  b/l LE not red, cool to touch,  no open skin no drainage  NEURO: nonfocal CN/motor/sensory/reflexes  Psych: normal affect and behavior, calm and cooperative         ASSESSMENT/PLAN:     patient met in nuclear stress lab; found in no acute distress. writer informed patient of isotope injected and unfortantely study was no long warranted. Patient explained that there is no harm to him, IVF will be provided to wash it out. Dr. Gifford called and made aware and asked to come speak to patient. IVF 250cc KVO started by writer.   patient was visibly getting upset and anxious, writer and COLLINS Chong from [facility] transported patient back to his unit (peds 142-3)  upon arrival to patients unit, patient  became diaphoretic, nauesous and dizzy consistent with panick attack.  RRT called for further assistance. Covering hospitalist of patient Dr. Owens to beside, further explained my discussion to patient. IVF stopped. Oral BP meds given for HTN. 192/115 down to 169/103 while I was with patient.   Dr. Gifford notified again and will see patient CC:  called by Nuclear stress Rn report patient received radioisotope and is s/p LHC last evening reporting normal cors.     HPI:   HPI:  Patient is a 48 y/o M with a PMH of HTN presenting to  ED on 4/30/25 for difficulty breathing and high blood pressure. He reports starting to have difficulty breathing since yesterday and that today he noted his systolic BP to be in the 160s. He reports compliance with his medications. Says the last time he saw a doctor was 1 week ago and at that time he was started on a new medication (propranolol) for a total of 4 medications for BP control. He also reports having a cough.     Labs significant for Na of 124.CXR without radiographic evidence of chest disease. He received IV Tylenol x 1, IV Toradol 30 mg x 1, PO potassium chloride 40 mEq x 1 and 1L NS bolus.     Currently, he denies fevers, chills, recent travel or close contact with sick individuals. Denies abdominal pain. Mentions vomiting twice since yesterday. Difficulty breathing has improved since arrival to the ED. He mentions having some chest pain which he says is muscular in nature. Denies any diarrhea. Says he feels thirsty throughout the day and while he is at work drinks about 1 gallon of water a day. Has no urinary complaints otherwise. Denies tobacco or alcohol use. Says his mother and grandfather have diabetes.        (01 May 2025 02:18)    Patient was taken last night cath lab   Operative Findings  s/p [facility]: nonobstructive CAD, LVEDP 7 mmHg  examined in nuclear stress test    RRA procedure site dressing removed, site soft, no swelling no bruising. strong radial pulse. bandaid applied       Vital Signs Last 24 Hrs  T(C): 36.7 (02 May 2025 07:19), Max: 36.9 (01 May 2025 23:55)  T(F): 98.1 (02 May 2025 07:19), Max: 98.5 (01 May 2025 23:55)  HR: 60 (02 May 2025 07:19) (58 - 90)  BP: 131/81 (02 May 2025 07:19) (100/74 - 140/99)  BP(mean): --  RR: 20 (02 May 2025 07:19) (15 - 20)  SpO2: 99% (02 May 2025 07:19) (97% - 100%)    Parameters below as of 02 May 2025 07:19  Patient On (Oxygen Delivery Method): room air                              14.9   7.72  )-----------( 269      ( 02 May 2025 06:40 )             42.3       05-02    133[L]  |  101  |  14  ----------------------------<  106[H]  3.8   |  28  |  0.88    Ca    9.0      02 May 2025 06:40  Phos  3.1     05-01  Mg     2.2     05-01    TPro  7.1  /  Alb  3.7  /  TBili  0.6  /  DBili  x   /  AST  27  /  ALT  52  /  AlkPhos  75  04-30              PHYSICAL EXAM  GENERAL: NAD, AAOx3  CHEST/LUNG: Clear to auscultation bilaterally; No wheeze  HEART: s1 s2 Regular rate and rhythm; No murmurs, rubs, or gallops  ABDOMEN: Soft, Nontender, Nondistended; Bowel sounds present X 4 quadrants   EXTREMITIES:  2+ Peripheral Pulses, No clubbing, cyanosis, or edema  SKIN: No rashes or lesions,  b/l LE not red, cool to touch,  no open skin no drainage  NEURO: nonfocal CN/motor/sensory/reflexes  Psych: normal affect and behavior, calm and cooperative         ASSESSMENT/PLAN:     patient met in nuclear stress lab; found in no acute distress. writer informed patient of isotope injected and unfortantely study was no longer warranted. Patient explained that there is no harm to him, IVF will be provided to wash it out. Dr. Gifford called and made aware and asked to come speak to patient. IVF 250cc KVO started by writer.   patient was visibly getting upset and anxious, writer and COLLINS Chong from [facility] transported patient back to his unit (peds 142-3)  upon arrival to patients unit, patient  became diaphoretic, nauesous and dizzy consistent with panick attack.  RRT called for further assistance. Covering hospitalist of patient Dr. Owens to beside, further explained my discussion to patient. IVF stopped. Oral BP meds given for HTN. 192/115 down to 169/103 while I was with patient.   Dr. Gifford notified again and will see patient

## 2025-05-02 NOTE — DISCHARGE NOTE PROVIDER - CARE PROVIDER_API CALL
Brian Bond  Cardiovascular Disease  [address] [place] [address]  Phone: [phone]  Fax: [phone]  Follow Up Time:

## 2025-05-08 DIAGNOSIS — E87.1 HYPO-OSMOLALITY AND HYPONATREMIA: ICD-10-CM

## 2025-05-08 DIAGNOSIS — I10 ESSENTIAL (PRIMARY) HYPERTENSION: ICD-10-CM

## 2025-05-08 DIAGNOSIS — E78.5 HYPERLIPIDEMIA, UNSPECIFIED: ICD-10-CM

## 2025-05-08 DIAGNOSIS — I25.118 ATHEROSCLEROTIC HEART DISEASE OF NATIVE CORONARY ARTERY WITH OTHER FORMS OF ANGINA PECTORIS: ICD-10-CM

## 2025-05-21 ENCOUNTER — APPOINTMENT (OUTPATIENT)
Dept: RADIOLOGY | Facility: CLINIC | Age: 50
End: 2025-05-21
Payer: COMMERCIAL

## 2025-05-21 ENCOUNTER — OUTPATIENT (OUTPATIENT)
Dept: OUTPATIENT SERVICES | Facility: HOSPITAL | Age: 50
LOS: 1 days | End: 2025-05-21
Payer: COMMERCIAL

## 2025-05-21 DIAGNOSIS — Z98.890 OTHER SPECIFIED POSTPROCEDURAL STATES: Chronic | ICD-10-CM

## 2025-05-21 DIAGNOSIS — R05.9 COUGH, UNSPECIFIED: ICD-10-CM

## 2025-05-21 PROCEDURE — 71046 X-RAY EXAM CHEST 2 VIEWS: CPT

## 2025-05-21 PROCEDURE — 71046 X-RAY EXAM CHEST 2 VIEWS: CPT | Mod: 26

## 2025-06-11 ENCOUNTER — EMERGENCY (EMERGENCY)
Facility: HOSPITAL | Age: 50
LOS: 0 days | Discharge: HOME | End: 2025-06-11
Attending: EMERGENCY MEDICINE
Payer: MEDICAID

## 2025-06-11 VITALS
HEART RATE: 81 BPM | RESPIRATION RATE: 16 BRPM | OXYGEN SATURATION: 100 % | TEMPERATURE: 98 F | SYSTOLIC BLOOD PRESSURE: 143 MMHG | DIASTOLIC BLOOD PRESSURE: 89 MMHG

## 2025-06-11 VITALS — HEIGHT: 66 IN | OXYGEN SATURATION: 98 %

## 2025-06-11 DIAGNOSIS — E78.5 HYPERLIPIDEMIA, UNSPECIFIED: ICD-10-CM

## 2025-06-11 DIAGNOSIS — I10 ESSENTIAL (PRIMARY) HYPERTENSION: ICD-10-CM

## 2025-06-11 DIAGNOSIS — Z98.890 OTHER SPECIFIED POSTPROCEDURAL STATES: Chronic | ICD-10-CM

## 2025-06-11 DIAGNOSIS — R05.1 ACUTE COUGH: ICD-10-CM

## 2025-06-11 LAB
ALBUMIN SERPL ELPH-MCNC: 3.6 G/DL — SIGNIFICANT CHANGE UP (ref 3.3–5)
ALP SERPL-CCNC: 82 U/L — SIGNIFICANT CHANGE UP (ref 40–120)
ALT FLD-CCNC: 52 U/L — SIGNIFICANT CHANGE UP (ref 12–78)
ANION GAP SERPL CALC-SCNC: 5 MMOL/L — SIGNIFICANT CHANGE UP (ref 5–17)
APTT BLD: 35.5 SEC — SIGNIFICANT CHANGE UP (ref 26.1–36.8)
AST SERPL-CCNC: 35 U/L — SIGNIFICANT CHANGE UP (ref 15–37)
BASOPHILS # BLD AUTO: 0.04 K/UL — SIGNIFICANT CHANGE UP (ref 0–0.2)
BASOPHILS NFR BLD AUTO: 0.7 % — SIGNIFICANT CHANGE UP (ref 0–2)
BILIRUB SERPL-MCNC: 0.4 MG/DL — SIGNIFICANT CHANGE UP (ref 0.2–1.2)
BUN SERPL-MCNC: 17 MG/DL — SIGNIFICANT CHANGE UP (ref 7–23)
CALCIUM SERPL-MCNC: 9.3 MG/DL — SIGNIFICANT CHANGE UP (ref 8.5–10.1)
CHLORIDE SERPL-SCNC: 109 MMOL/L — HIGH (ref 96–108)
CO2 SERPL-SCNC: 23 MMOL/L — SIGNIFICANT CHANGE UP (ref 22–31)
CREAT SERPL-MCNC: 1.02 MG/DL — SIGNIFICANT CHANGE UP (ref 0.5–1.3)
EGFR: 90 ML/MIN/1.73M2 — SIGNIFICANT CHANGE UP
EGFR: 90 ML/MIN/1.73M2 — SIGNIFICANT CHANGE UP
EOSINOPHIL # BLD AUTO: 0.69 K/UL — HIGH (ref 0–0.5)
EOSINOPHIL NFR BLD AUTO: 12.9 % — HIGH (ref 0–6)
GLUCOSE SERPL-MCNC: 104 MG/DL — HIGH (ref 70–99)
HCT VFR BLD CALC: 46.2 % — SIGNIFICANT CHANGE UP (ref 39–50)
HGB BLD-MCNC: 15.9 G/DL — SIGNIFICANT CHANGE UP (ref 13–17)
IMM GRANULOCYTES # BLD AUTO: 0.01 K/UL — SIGNIFICANT CHANGE UP (ref 0–0.07)
IMM GRANULOCYTES NFR BLD AUTO: 0.2 % — SIGNIFICANT CHANGE UP (ref 0–0.9)
INR BLD: 0.9 RATIO — SIGNIFICANT CHANGE UP (ref 0.85–1.16)
LYMPHOCYTES # BLD AUTO: 1.55 K/UL — SIGNIFICANT CHANGE UP (ref 1–3.3)
LYMPHOCYTES NFR BLD AUTO: 29 % — SIGNIFICANT CHANGE UP (ref 13–44)
MAGNESIUM SERPL-MCNC: 2.1 MG/DL — SIGNIFICANT CHANGE UP (ref 1.6–2.6)
MCHC RBC-ENTMCNC: 29.9 PG — SIGNIFICANT CHANGE UP (ref 27–34)
MCHC RBC-ENTMCNC: 34.4 G/DL — SIGNIFICANT CHANGE UP (ref 32–36)
MCV RBC AUTO: 87 FL — SIGNIFICANT CHANGE UP (ref 80–100)
MONOCYTES # BLD AUTO: 0.4 K/UL — SIGNIFICANT CHANGE UP (ref 0–0.9)
MONOCYTES NFR BLD AUTO: 7.5 % — SIGNIFICANT CHANGE UP (ref 2–14)
NEUTROPHILS # BLD AUTO: 2.65 K/UL — SIGNIFICANT CHANGE UP (ref 1.8–7.4)
NEUTROPHILS NFR BLD AUTO: 49.7 % — SIGNIFICANT CHANGE UP (ref 43–77)
NRBC # BLD AUTO: 0 K/UL — SIGNIFICANT CHANGE UP (ref 0–0)
NRBC # FLD: 0 K/UL — SIGNIFICANT CHANGE UP (ref 0–0)
NRBC BLD AUTO-RTO: 0 /100 WBCS — SIGNIFICANT CHANGE UP (ref 0–0)
NT-PROBNP SERPL-SCNC: 10 PG/ML — SIGNIFICANT CHANGE UP (ref 0–125)
PLATELET # BLD AUTO: 257 K/UL — SIGNIFICANT CHANGE UP (ref 150–400)
PMV BLD: 10.1 FL — SIGNIFICANT CHANGE UP (ref 7–13)
POTASSIUM SERPL-MCNC: 4.1 MMOL/L — SIGNIFICANT CHANGE UP (ref 3.5–5.3)
POTASSIUM SERPL-SCNC: 4.1 MMOL/L — SIGNIFICANT CHANGE UP (ref 3.5–5.3)
PROT SERPL-MCNC: 7 GM/DL — SIGNIFICANT CHANGE UP (ref 6–8.3)
PROTHROM AB SERPL-ACNC: 10.4 SEC — SIGNIFICANT CHANGE UP (ref 9.9–13.4)
RBC # BLD: 5.31 M/UL — SIGNIFICANT CHANGE UP (ref 4.2–5.8)
RBC # FLD: 12.2 % — SIGNIFICANT CHANGE UP (ref 10.3–14.5)
SODIUM SERPL-SCNC: 137 MMOL/L — SIGNIFICANT CHANGE UP (ref 135–145)
TROPONIN I, HIGH SENSITIVITY RESULT: 4.19 NG/L — SIGNIFICANT CHANGE UP
WBC # BLD: 5.34 K/UL — SIGNIFICANT CHANGE UP (ref 3.8–10.5)
WBC # FLD AUTO: 5.34 K/UL — SIGNIFICANT CHANGE UP (ref 3.8–10.5)

## 2025-06-11 PROCEDURE — 93005 ELECTROCARDIOGRAM TRACING: CPT

## 2025-06-11 PROCEDURE — 70491 CT SOFT TISSUE NECK W/DYE: CPT

## 2025-06-11 PROCEDURE — 71275 CT ANGIOGRAPHY CHEST: CPT | Mod: 26

## 2025-06-11 PROCEDURE — 71275 CT ANGIOGRAPHY CHEST: CPT

## 2025-06-11 PROCEDURE — 99285 EMERGENCY DEPT VISIT HI MDM: CPT | Mod: 25

## 2025-06-11 PROCEDURE — 85610 PROTHROMBIN TIME: CPT

## 2025-06-11 PROCEDURE — 80053 COMPREHEN METABOLIC PANEL: CPT

## 2025-06-11 PROCEDURE — 96374 THER/PROPH/DIAG INJ IV PUSH: CPT | Mod: XU

## 2025-06-11 PROCEDURE — 83735 ASSAY OF MAGNESIUM: CPT

## 2025-06-11 PROCEDURE — 83880 ASSAY OF NATRIURETIC PEPTIDE: CPT

## 2025-06-11 PROCEDURE — 36415 COLL VENOUS BLD VENIPUNCTURE: CPT

## 2025-06-11 PROCEDURE — 70491 CT SOFT TISSUE NECK W/DYE: CPT | Mod: 26

## 2025-06-11 PROCEDURE — 85730 THROMBOPLASTIN TIME PARTIAL: CPT

## 2025-06-11 PROCEDURE — 99285 EMERGENCY DEPT VISIT HI MDM: CPT

## 2025-06-11 PROCEDURE — 84484 ASSAY OF TROPONIN QUANT: CPT

## 2025-06-11 PROCEDURE — 93010 ELECTROCARDIOGRAM REPORT: CPT

## 2025-06-11 PROCEDURE — 85025 COMPLETE CBC W/AUTO DIFF WBC: CPT

## 2025-06-11 RX ORDER — BENZONATATE 100 MG
1 CAPSULE ORAL
Qty: 15 | Refills: 0
Start: 2025-06-11 | End: 2025-06-15

## 2025-06-11 RX ORDER — PREDNISONE 20 MG/1
2 TABLET ORAL
Qty: 8 | Refills: 0
Start: 2025-06-11 | End: 2025-06-14

## 2025-06-11 RX ORDER — METHYLPREDNISOLONE ACETATE 80 MG/ML
125 INJECTION, SUSPENSION INTRA-ARTICULAR; INTRALESIONAL; INTRAMUSCULAR; SOFT TISSUE ONCE
Refills: 0 | Status: COMPLETED | OUTPATIENT
Start: 2025-06-11 | End: 2025-06-11

## 2025-06-11 RX ORDER — BENZONATATE 100 MG
100 CAPSULE ORAL ONCE
Refills: 0 | Status: COMPLETED | OUTPATIENT
Start: 2025-06-11 | End: 2025-06-11

## 2025-06-11 RX ADMIN — Medication 100 MILLIGRAM(S): at 10:49

## 2025-06-11 RX ADMIN — METHYLPREDNISOLONE ACETATE 125 MILLIGRAM(S): 80 INJECTION, SUSPENSION INTRA-ARTICULAR; INTRALESIONAL; INTRAMUSCULAR; SOFT TISSUE at 10:49

## 2025-06-11 NOTE — ED ADULT TRIAGE NOTE - CHIEF COMPLAINT QUOTE
Patient presents to the ER with complaints of constant cough, upper back pain, throat pain and feeling like "something is stuck" in his throat for a couple of weeks. Patient took inhaler prescribed. Patient works as . 02 98% in triage. Denies chest pain.

## 2025-06-11 NOTE — ED STATDOCS - NSFOLLOWUPCLINICS_GEN_ALL_ED_FT
[facility]  Pulmonary Medicine  [address], [address]  [place] [address]  Phone: [phone]  Fax:

## 2025-06-11 NOTE — ED STATDOCS - OBJECTIVE STATEMENT
48 y/o male with PMHx of HLD, HTN, gastritis, presents to the ED c/o constant, long standing throat pain. Pt states that it feels like something is stuck in his lung and is constantly trying to clear his throat. Denies fever. 50 y/o male with PMHx of HLD, HTN, gastritis, presents to the ED c/o constant, long standing throat pain. Pt states that it feels like something is stuck in his lung and is constantly trying to clear his throat. Denies fever. Also with coughing. Saw pulm and dx w/asthma. Put on an inhaler. No relief. Pt works outside with [employer]. Denies prior hx of seasonal allergies. No cardiac hx. No cardiac stents.  Non smoker.

## 2025-06-11 NOTE — ED STATDOCS - NSFOLLOWUPINSTRUCTIONS_ED_ALL_ED_FT
Acute Cough    WHAT YOU NEED TO KNOW:    An acute cough can last up to 3 weeks. Common causes of an acute cough include a cold, allergies, or a lung infection.     Take prednisone as prescribed  Take tessalon perles for cough as needed every 8 hours   Take Allegra every day   Use flonase nasal spray twice daily for 5 days     DISCHARGE INSTRUCTIONS:    Return to the emergency department if:     You have trouble breathing or feel short of breath.      You cough up blood, or you see blood in your mucus.       You faint or feel weak or dizzy.       You have chest pain when you cough or take a deep breath.       You have new wheezing.     Contact your healthcare provider if:     You have a fever.       Your cough lasts longer than 4 weeks.       Your symptoms do not improve with treatment.       You have questions or concerns about your condition or care.     Medicines:     Medicines may be needed to stop the cough, decrease swelling in your airways, or help open your airways. Medicine may also be given to help you cough up mucus. Ask your healthcare provider what over-the-counter medicines you can take. If you have an infection caused by bacteria, you may need antibiotics.       Take your medicine as directed. Contact your healthcare provider if you think your medicine is not helping or if you have side effects. Tell him or her if you are allergic to any medicine. Keep a list of the medicines, vitamins, and herbs you take. Include the amounts, and when and why you take them. Bring the list or the pill bottles to follow-up visits. Carry your medicine list with you in case of an emergency.    Manage your symptoms:     Do not smoke and stay away from others who smoke. Nicotine and other chemicals in cigarettes and cigars can cause lung damage and make your cough worse. Ask your healthcare provider for information if you currently smoke and need help to quit. E-cigarettes or smokeless tobacco still contain nicotine. Talk to your healthcare provider before you use these products.       Drink extra liquids as directed. Liquids will help thin and loosen mucus so you can cough it up. Liquids will also help prevent dehydration. Examples of good liquids to drink include water, fruit juice, and broth. Do not drink liquids that contain caffeine. Caffeine can increase your risk for dehydration. Ask your healthcare provider how much liquid to drink each day.       Rest as directed. Do not do activities that make your cough worse, such as exercise.       Use a humidifier or vaporizer. Use a cool mist humidifier or a vaporizer to increase air moisture in your home. This may make it easier for you to breathe and help decrease your cough.       Eat 2 to 5 mL of honey 2 times each day. Honey can help thin mucus and decrease your cough.       Use cough drops or lozenges. These can help decrease throat irritation and your cough.     Follow up with your healthcare provider as directed: Write down your questions so you remember to ask them during your visits.

## 2025-06-11 NOTE — ED STATDOCS - CLINICAL SUMMARY MEDICAL DECISION MAKING FREE TEXT BOX
48 y/o male with PMHx of HLD, HTN, gastritis, presents to the ED c/o constant, long standing throat pain. Plan for labs, CT neck and CT chest, will give steroids and cough medicine. Observation and re-evaluate.

## 2025-06-11 NOTE — ED STATDOCS - PROGRESS NOTE DETAILS
48 y/o male presented to the ED c/o cough. Pt states that it feels like something is stuck in his lung and is constantly trying to clear his throat. Denies fever, chills, sweats, cp, sob, n/v/d.   Will get labs, CT. Solumedrol, tessalon perles  Re-bruna Sood PA-C 50 y/o male presented to the ED c/o cough. Pt states that it feels like something is stuck in his lung and is constantly trying to clear his throat. Denies fever, chills, sweats, cp, sob, n/v/d.   Will get labs, CT angio chest and CT neck soft tissue. Solumedrol, tessalon perles  Re-eval   Nikki Sood PA-C

## 2025-06-11 NOTE — ED STATDOCS - CARE PROVIDER_API CALL
Nabeel Bingham Hawthorne  Pulmonary Disease  [address], [address] [address]  Phone: [phone]  Fax: [phone]  Follow Up Time:

## 2025-06-11 NOTE — ED ADULT NURSE NOTE - NSFALLUNIVINTERV_ED_ALL_ED
Bed/Stretcher in lowest position, wheels locked, appropriate side rails in place/Call bell, personal items and telephone in reach/Instruct patient to call for assistance before getting out of bed/chair/stretcher/Non-slip footwear applied when patient is off stretcher/[place] to call system/Physically safe environment - no spills, clutter or unnecessary equipment/Purposeful proactive rounding/Room/bathroom lighting operational, light cord in reach

## 2025-06-11 NOTE — ED ADULT NURSE NOTE - OBJECTIVE STATEMENT
Patient presents to the ER with complaints of constant cough, upper back pain, throat pain and feeling like "something is stuck" in his throat for a couple of weeks. Patient took inhaler prescribed. Patient works as . 02 98% in triage. Denies chest pain. pt worked up in st and handed off to rw team

## 2025-06-11 NOTE — ED STATDOCS - PATIENT PORTAL LINK FT
You can access the FollowMyHealth Patient Portal offered by [facility] by registering at the following website: [email or web address]. By joining [employer]’s FollowMyHealth portal, you will also be able to view your health information using other applications (apps) compatible with our system.

## 2025-06-11 NOTE — ED STATDOCS - PHYSICAL EXAMINATION
Constitutional: NAD AOx3. Coughing and clearing throat a lot. Normal oral pharynx  Eyes: PERRL EOMI  Head: Normocephalic atraumatic  Mouth: MMM  Cardiac: regular rate and rhythm  Resp: Lungs CTAB  GI: Abd s/nd/nt  Neuro: CN2-12 grossly intact, HERRERA x 4  Skin: No visible rashes Constitutional: NAD AOx3. Coughing and clearing throat a lot.   Eyes: PERRL EOMI  Head: Normocephalic atraumatic  Mouth: MMM, nl oropharynx  Cardiac: regular rate and rhythm  Resp: Lungs CTAB  GI: Abd s/nd/nt  Neuro: CN2-12 grossly intact, HERRERA x 4  Skin: No visible rashes

## 2025-07-24 ENCOUNTER — EMERGENCY (EMERGENCY)
Facility: HOSPITAL | Age: 50
LOS: 0 days | Discharge: HOME | End: 2025-07-24
Attending: STUDENT IN AN ORGANIZED HEALTH CARE EDUCATION/TRAINING PROGRAM
Payer: MEDICAID

## 2025-07-24 VITALS
DIASTOLIC BLOOD PRESSURE: 90 MMHG | RESPIRATION RATE: 18 BRPM | SYSTOLIC BLOOD PRESSURE: 138 MMHG | TEMPERATURE: 98 F | OXYGEN SATURATION: 98 % | HEART RATE: 67 BPM

## 2025-07-24 VITALS — HEIGHT: 66 IN

## 2025-07-24 DIAGNOSIS — K21.9 GASTRO-ESOPHAGEAL REFLUX DISEASE WITHOUT ESOPHAGITIS: ICD-10-CM

## 2025-07-24 DIAGNOSIS — R05.9 COUGH, UNSPECIFIED: ICD-10-CM

## 2025-07-24 DIAGNOSIS — R00.1 BRADYCARDIA, UNSPECIFIED: ICD-10-CM

## 2025-07-24 DIAGNOSIS — Z98.890 OTHER SPECIFIED POSTPROCEDURAL STATES: Chronic | ICD-10-CM

## 2025-07-24 DIAGNOSIS — R07.9 CHEST PAIN, UNSPECIFIED: ICD-10-CM

## 2025-07-24 DIAGNOSIS — I10 ESSENTIAL (PRIMARY) HYPERTENSION: ICD-10-CM

## 2025-07-24 DIAGNOSIS — E78.5 HYPERLIPIDEMIA, UNSPECIFIED: ICD-10-CM

## 2025-07-24 PROCEDURE — 71046 X-RAY EXAM CHEST 2 VIEWS: CPT

## 2025-07-24 PROCEDURE — 99285 EMERGENCY DEPT VISIT HI MDM: CPT

## 2025-07-24 PROCEDURE — 71046 X-RAY EXAM CHEST 2 VIEWS: CPT | Mod: 26

## 2025-07-24 PROCEDURE — 93010 ELECTROCARDIOGRAM REPORT: CPT

## 2025-07-24 PROCEDURE — 99283 EMERGENCY DEPT VISIT LOW MDM: CPT | Mod: 25

## 2025-07-24 PROCEDURE — 93005 ELECTROCARDIOGRAM TRACING: CPT

## 2025-07-24 RX ORDER — ATORVASTATIN CALCIUM 80 MG/1
1 TABLET, FILM COATED ORAL
Qty: 30 | Refills: 0
Start: 2025-07-24 | End: 2025-08-22

## 2025-07-24 RX ORDER — AMLODIPINE BESYLATE 10 MG/1
1 TABLET ORAL
Qty: 30 | Refills: 0
Start: 2025-07-24 | End: 2025-08-22

## 2025-07-24 RX ORDER — BENZONATATE 100 MG
1 CAPSULE ORAL
Qty: 45 | Refills: 0
Start: 2025-07-24 | End: 2025-08-07

## 2025-07-24 RX ORDER — BENZONATATE 100 MG
100 CAPSULE ORAL ONCE
Refills: 0 | Status: COMPLETED | OUTPATIENT
Start: 2025-07-24 | End: 2025-07-24

## 2025-07-24 RX ORDER — LOSARTAN POTASSIUM 100 MG/1
1 TABLET, FILM COATED ORAL
Qty: 30 | Refills: 0
Start: 2025-07-24 | End: 2025-08-22

## 2025-07-24 RX ORDER — SPIRONOLACTONE 25 MG
2 TABLET ORAL
Qty: 60 | Refills: 0
Start: 2025-07-24 | End: 2025-08-22

## 2025-07-24 RX ADMIN — Medication 100 MILLIGRAM(S): at 15:40

## 2025-07-24 NOTE — ED STATDOCS - NSICDXFAMILYHX_GEN_ALL_CORE_FT
FAMILY HISTORY:  Father  Still living? No  Family history of prostate cancer in father, Age at diagnosis: 61-70    
Principal Discharge DX:	Bronchitis  Instructions for follow-up, activity and diet:	1) Please use albuterol, afrin nasal spray and mucinex as prescribed for your cough. You have been diagnosed with bronchitis and postnasal discharge.    2)Please follow-up with your primary care doctor within the next 3 days.  Please call today or tomorrow for an appointment.  If you cannot follow-up with your doctor(s), please return to the ED for any urgent issues.  2) If you have any worsening of symptoms or any other concerns please return to the ED immediately.  3) Please continue taking your home medications as directed.  4) You may have been given a copy of your labs and/or imaging.  Please go over these with your primary care doctor.  Secondary Diagnosis:	Postnasal drip

## 2025-07-24 NOTE — ED STATDOCS - NSFOLLOWUPCLINICS_GEN_ALL_ED_FT
[facility] [place] [address] [place] [address]  Phone: [phone]  Fax:

## 2025-07-24 NOTE — ED ADULT NURSE NOTE - OBJECTIVE STATEMENT
Pt comes to the ED complaining of cough x 1 week. Pt was given prednisone and bensonate previously. Pt has completed the course of the medication and is requesting a refill.

## 2025-07-24 NOTE — ED ADULT TRIAGE NOTE - CHIEF COMPLAINT QUOTE
pt ambulatory to ER for cough x1 week. endorsing sore throat, excessive phlegm. denies any recent sick contacts.

## 2025-07-24 NOTE — ED STATDOCS - PROGRESS NOTE DETAILS
Pt. is a 49 year old male Hx HTN, GERD, HLD, followed by [facility] presents to ED with cough x 2 months and CP x 2 months.  Pt. evaluated last month in ED and had negative chest CT.  He was told by his PMD that he needs to see a specialist (Pulmonary?) and he has been waiting for a call.  Nobody has called him.  Pt. lisa out of his [place].  Amira Roberson PA-C  . Plan for labs, EKG, CXR.  Amira Roberson PA-C I spoke with  and the [facility] will contact him for his appointment.  Amira Roberson PA-C CXR negative for acute findings.  EKG bradycardia 57.  No other acute findings.  Amira Roberson PA-C

## 2025-07-24 NOTE — ED STATDOCS - OBJECTIVE STATEMENT
48 y/o male with HX of HTN GERD, and HLD presents to ED with productive cough for two months and chest pain for 2 months. Cough is productive. Was refereed to Pulmonologist by PCP who he has not seen. 50 y/o male with HX of HTN GERD, and HLD presents to ED with productive cough for two months. Was referred to Pulmonologist by PCP who he has not seen. No fever, chills, sob, NVD, abd pain, back pain, leg pain or swelling. Pt states he ran out of tessalon perles and would like a refill. Pt reports he is not compliant with his BP medications bc he believes he should not be taking "so many medications".

## 2025-07-24 NOTE — ED STATDOCS - PATIENT PORTAL LINK FT
You can access the FollowMyHealth Patient Portal offered by [facility] by registering at the following website: [email or web address]. By joining [employer]’s FollowMyHealth portal, you will also be able to view your health information using other applications (apps) compatible with our system.

## 2025-07-24 NOTE — ED ADULT NURSE NOTE - MOUTH
pink/moist mucosa
This was a shared visit with the Z[facility]. I reviewed and verified the documentation and independently performed the documented:

## 2025-07-24 NOTE — ED STATDOCS - NSFOLLOWUPINSTRUCTIONS_ED_ALL_ED_FT
Tos crónica    LO QUE NECESITA SABER:    Sena tos crónica es sena tos que dura más de 4 semanas en los niños y 8 semanas en los adultos.    INSTRUCCIONES SOBRE [facility]ITAL[facility]:    Llame al número de emergencias local (911 en los [place]) en cualquiera de los siguientes casos:    Usted expectora malik.    Usted se desmaya al toser.    Usted tiene dificultad para respirar.  Llame a zhang médico si:    Usted tiene nuevos síntomas o stefanie síntomas empeoran.    Usted tiene un damian dolor al respirar profundo.    Usted está muy cansado después de un ataque de tos.    Usted tiene dificultad para dormir debido a la tos.    Usted tiene preguntas o inquietudes acerca de zhang condición o cuidado.  Medicamentos:    Los medicamentospodrían ser necesarios para detener la tos. Usted también podría necesitar medicamentos para tratar las alergias o la acidez estomacal o para la inflamación en las vías respiratorias. En geovany que tenga sena infección respiratoria usted podría necesitar de antibióticos. Un medicamento se lo podrían recetar en presentación de píldora o para que lo use con un inhalador.    [place] stefanie medicamentos blane se le haya indicado.Consulte con zhang médico si usted loree que zhang medicamento no le está ayudando o si presenta efectos secundarios. Infórmele al médico si usted es alérgico a algún medicamento. Mantenga sena lista actualizada de los medicamentos, las vitaminas y los productos herbales que jian. Incluya los siguientes datos de los medicamentos: cantidad, frecuencia y motivo de administración. Traiga con usted la lista o los envases de las píldoras a stefanie citas de seguimiento. Lleve la lista de los medicamentos con usted en geovany de sena emergencia.  Cuidados personales:    Evite la acidez estomacal.El reflujo biliar puede provocar que zhang tos crónica se empeore. Eleve la russell y la parte superior de la espalda cuando vaya a dormir. Use más de 2 almohadas debajo de zhang russell o duerma en sena silla reclinable. No se acueste por al menos 1 hora después de comer. No consuma alimentos ni bebidas que le aumenten la acidez. Consulte con zhang médico para que le indique otras formas para evitar la acidez.  Prevención de la ERGE      No fume.Aconséjele a zhang taina adolescente que no vaya a fumar. La nicotina y otras sustancias químicas que contienen los cigarrillos y cigarros pueden dañar los pulmones. El fumar además puede empeorar zhang tos. Pida información a zhang médico si usted actualmente fuma y necesita ayuda para dejar de fumar. Los cigarrillos electrónicos o el tabaco sin humo igualmente contienen nicotina. Consulte con zhang médico antes de utilizar estos productos.    Evite la exposición involuntaria al humo del cigarrillo.No permita que las personas fumen en zhang vehículo, casa o alrededor de zhang taina. No se acerque a sena persona que esté fumando. Incluso a cualquier persona que esté fumando un cigarrillo electrónico.    Evite cualquier cosa que le desencadena stefanie alergias o la irritación de la garganta.Los alérgenos e irritantes pueden empeorar zhang tos crónica. Los alérgenos incluyen los ácaros del polvo, el polen, la caspa de las mascotas o el moho. Use sena máscara si usted trabaja con sustancias contaminantes o irritantes. Consulte a zhang médico por otras formas para disminuir zhang exposición a los álergenos o irritantes.    [place] suficiente líquido blane le indiquen.Los líquidos pueden ayudar a aliviar la incomodidad en zhang garganta que le provoca la tos. Para aliviar zhang dolor de garganta añada miel al te, bertha aromáticas o al [place]. Pregunte cuánto líquido debe cony cada día y cuáles líquidos son los más adecuados para usted.  Acuda a stefanie consultas de control con zhnag médico según le indicaron:Es posible que necesite regresar para que le realicen más exámenes. Zhang médico puede referirlo con otros especialistas. [place]ote stefanie preguntas para que se acuerde de hacerlas shashank stefanie visitas.

## 2025-07-24 NOTE — ED STATDOCS - CLINICAL SUMMARY MEDICAL DECISION MAKING FREE TEXT BOX
Presentation most consistent with bronchitis, low suspicion for PNA, patient is afebrile, 98% O2 on room air with lungs CTAB. Plan for EKG, chest-x-ray, monitor and DC with Pulmonology f/u and return precautions. Presentation most consistent with bronchitis, low suspicion for PNA, patient is afebrile, 98% O2 on room air with lungs CTAB. Plan for EKG, chest-x-ray, monitor and DC with Pulmonology f/u and return precautions. EKG sinus bradycardia, rate 57, normal axis, Normal intervals, no ST elevations or depressions–time 1516 Presentation most consistent with bronchitis, low suspicion for PNA, patient is afebrile, 98% O2 on room air with lungs CTAB. Plan for EKG, chest-x-ray, monitor and DC with Pulmonology f/u and return precautions. EKG sinus bradycardia, rate 57, normal axis, Normal intervals, no ST elevations or depressions–time 1516  CXR no acute actionable findings. Pt dc home in stable condition with pulm (has contact info for pulm at [facility]) and pcp f/u and strict return precautions

## 2025-08-27 ENCOUNTER — EMERGENCY (EMERGENCY)
Facility: HOSPITAL | Age: 50
LOS: 1 days | End: 2025-08-27
Attending: STUDENT IN AN ORGANIZED HEALTH CARE EDUCATION/TRAINING PROGRAM
Payer: SELF-PAY

## 2025-08-27 VITALS
SYSTOLIC BLOOD PRESSURE: 125 MMHG | TEMPERATURE: 98 F | DIASTOLIC BLOOD PRESSURE: 76 MMHG | RESPIRATION RATE: 20 BRPM | OXYGEN SATURATION: 98 % | HEART RATE: 82 BPM

## 2025-08-27 VITALS
SYSTOLIC BLOOD PRESSURE: 151 MMHG | DIASTOLIC BLOOD PRESSURE: 99 MMHG | TEMPERATURE: 99 F | WEIGHT: 145.06 LBS | OXYGEN SATURATION: 97 % | RESPIRATION RATE: 20 BRPM | HEART RATE: 94 BPM | HEIGHT: 66 IN

## 2025-08-27 DIAGNOSIS — Z98.890 OTHER SPECIFIED POSTPROCEDURAL STATES: Chronic | ICD-10-CM

## 2025-08-27 LAB
ANION GAP SERPL CALC-SCNC: 14 MMOL/L — SIGNIFICANT CHANGE UP (ref 5–17)
BASOPHILS # BLD AUTO: 0.05 K/UL — SIGNIFICANT CHANGE UP (ref 0–0.2)
BASOPHILS NFR BLD AUTO: 0.7 % — SIGNIFICANT CHANGE UP (ref 0–2)
BUN SERPL-MCNC: 15 MG/DL — SIGNIFICANT CHANGE UP (ref 7–23)
CALCIUM SERPL-MCNC: 9.9 MG/DL — SIGNIFICANT CHANGE UP (ref 8.4–10.5)
CHLORIDE SERPL-SCNC: 102 MMOL/L — SIGNIFICANT CHANGE UP (ref 96–108)
CO2 SERPL-SCNC: 21 MMOL/L — LOW (ref 22–31)
CREAT SERPL-MCNC: 0.84 MG/DL — SIGNIFICANT CHANGE UP (ref 0.5–1.3)
EGFR: 107 ML/MIN/1.73M2 — SIGNIFICANT CHANGE UP
EGFR: 107 ML/MIN/1.73M2 — SIGNIFICANT CHANGE UP
EOSINOPHIL # BLD AUTO: 0.68 K/UL — HIGH (ref 0–0.5)
EOSINOPHIL NFR BLD AUTO: 9.1 % — HIGH (ref 0–6)
FLUAV AG NPH QL: SIGNIFICANT CHANGE UP
FLUBV AG NPH QL: SIGNIFICANT CHANGE UP
GLUCOSE SERPL-MCNC: 94 MG/DL — SIGNIFICANT CHANGE UP (ref 70–99)
HCT VFR BLD CALC: 48.5 % — SIGNIFICANT CHANGE UP (ref 39–50)
HGB BLD-MCNC: 16.7 G/DL — SIGNIFICANT CHANGE UP (ref 13–17)
IMM GRANULOCYTES # BLD AUTO: 0.01 K/UL — SIGNIFICANT CHANGE UP (ref 0–0.07)
IMM GRANULOCYTES NFR BLD AUTO: 0.1 % — SIGNIFICANT CHANGE UP (ref 0–0.9)
LYMPHOCYTES # BLD AUTO: 2.34 K/UL — SIGNIFICANT CHANGE UP (ref 1–3.3)
LYMPHOCYTES NFR BLD AUTO: 31.3 % — SIGNIFICANT CHANGE UP (ref 13–44)
MCHC RBC-ENTMCNC: 30.3 PG — SIGNIFICANT CHANGE UP (ref 27–34)
MCHC RBC-ENTMCNC: 34.4 G/DL — SIGNIFICANT CHANGE UP (ref 32–36)
MCV RBC AUTO: 87.9 FL — SIGNIFICANT CHANGE UP (ref 80–100)
MONOCYTES # BLD AUTO: 0.56 K/UL — SIGNIFICANT CHANGE UP (ref 0–0.9)
MONOCYTES NFR BLD AUTO: 7.5 % — SIGNIFICANT CHANGE UP (ref 2–14)
NEUTROPHILS # BLD AUTO: 3.83 K/UL — SIGNIFICANT CHANGE UP (ref 1.8–7.4)
NEUTROPHILS NFR BLD AUTO: 51.3 % — SIGNIFICANT CHANGE UP (ref 43–77)
NRBC # BLD AUTO: 0 K/UL — SIGNIFICANT CHANGE UP (ref 0–0)
NRBC # FLD: 0 K/UL — SIGNIFICANT CHANGE UP (ref 0–0)
NRBC BLD AUTO-RTO: 0 /100 WBCS — SIGNIFICANT CHANGE UP (ref 0–0)
NT-PROBNP SERPL-SCNC: <36 PG/ML — SIGNIFICANT CHANGE UP (ref 0–300)
PLATELET # BLD AUTO: 263 K/UL — SIGNIFICANT CHANGE UP (ref 150–400)
PMV BLD: 10.2 FL — SIGNIFICANT CHANGE UP (ref 7–13)
POTASSIUM SERPL-MCNC: 4.1 MMOL/L — SIGNIFICANT CHANGE UP (ref 3.5–5.3)
POTASSIUM SERPL-SCNC: 4.1 MMOL/L — SIGNIFICANT CHANGE UP (ref 3.5–5.3)
RBC # BLD: 5.52 M/UL — SIGNIFICANT CHANGE UP (ref 4.2–5.8)
RBC # FLD: 11.8 % — SIGNIFICANT CHANGE UP (ref 10.3–14.5)
RSV RNA NPH QL NAA+NON-PROBE: SIGNIFICANT CHANGE UP
SARS-COV-2 RNA SPEC QL NAA+PROBE: SIGNIFICANT CHANGE UP
SODIUM SERPL-SCNC: 137 MMOL/L — SIGNIFICANT CHANGE UP (ref 135–145)
SOURCE RESPIRATORY: SIGNIFICANT CHANGE UP
WBC # BLD: 7.47 K/UL — SIGNIFICANT CHANGE UP (ref 3.8–10.5)
WBC # FLD AUTO: 7.47 K/UL — SIGNIFICANT CHANGE UP (ref 3.8–10.5)

## 2025-08-27 PROCEDURE — 87637 SARSCOV2&INF A&B&RSV AMP PRB: CPT

## 2025-08-27 PROCEDURE — 94640 AIRWAY INHALATION TREATMENT: CPT

## 2025-08-27 PROCEDURE — 83880 ASSAY OF NATRIURETIC PEPTIDE: CPT

## 2025-08-27 PROCEDURE — 71046 X-RAY EXAM CHEST 2 VIEWS: CPT | Mod: 26

## 2025-08-27 PROCEDURE — 99284 EMERGENCY DEPT VISIT MOD MDM: CPT

## 2025-08-27 PROCEDURE — 99284 EMERGENCY DEPT VISIT MOD MDM: CPT | Mod: 25

## 2025-08-27 PROCEDURE — 71046 X-RAY EXAM CHEST 2 VIEWS: CPT

## 2025-08-27 PROCEDURE — 80048 BASIC METABOLIC PNL TOTAL CA: CPT

## 2025-08-27 PROCEDURE — 85025 COMPLETE CBC W/AUTO DIFF WBC: CPT

## 2025-08-27 PROCEDURE — 36000 PLACE NEEDLE IN VEIN: CPT

## 2025-08-27 RX ORDER — PREDNISONE 20 MG/1
10 TABLET ORAL ONCE
Refills: 0 | Status: COMPLETED | OUTPATIENT
Start: 2025-08-27 | End: 2025-08-27

## 2025-08-27 RX ORDER — IPRATROPIUM BROMIDE AND ALBUTEROL SULFATE .5; 2.5 MG/3ML; MG/3ML
3 SOLUTION RESPIRATORY (INHALATION) ONCE
Refills: 0 | Status: COMPLETED | OUTPATIENT
Start: 2025-08-27 | End: 2025-08-27

## 2025-08-27 RX ADMIN — PREDNISONE 10 MILLIGRAM(S): 20 TABLET ORAL at 13:08

## 2025-08-27 RX ADMIN — IPRATROPIUM BROMIDE AND ALBUTEROL SULFATE 3 MILLILITER(S): .5; 2.5 SOLUTION RESPIRATORY (INHALATION) at 13:04

## 2025-08-27 RX ADMIN — Medication 10 MILLIGRAM(S): at 14:46

## 2025-08-28 ENCOUNTER — APPOINTMENT (OUTPATIENT)
Dept: PULMONOLOGY | Facility: CLINIC | Age: 50
End: 2025-08-28

## 2025-09-03 ENCOUNTER — APPOINTMENT (OUTPATIENT)
Dept: PULMONOLOGY | Facility: CLINIC | Age: 50
End: 2025-09-03
Payer: SELF-PAY

## 2025-09-03 ENCOUNTER — LABORATORY RESULT (OUTPATIENT)
Age: 50
End: 2025-09-03

## 2025-09-03 VITALS
TEMPERATURE: 97.8 F | BODY MASS INDEX: 25.16 KG/M2 | HEART RATE: 76 BPM | SYSTOLIC BLOOD PRESSURE: 157 MMHG | OXYGEN SATURATION: 96 % | HEIGHT: 65 IN | RESPIRATION RATE: 16 BRPM | DIASTOLIC BLOOD PRESSURE: 95 MMHG | WEIGHT: 151 LBS

## 2025-09-03 DIAGNOSIS — R06.02 SHORTNESS OF BREATH: ICD-10-CM

## 2025-09-03 PROCEDURE — 99203 OFFICE O/P NEW LOW 30 MIN: CPT | Mod: GC

## 2025-09-04 LAB
A ALTERNATA IGE QN: <0.1 KUA/L
A FUMIGATUS IGE QN: <0.1 KUA/L
C ALBICANS IGE QN: <0.1 KUA/L
C HERBARUM IGE QN: <0.1 KUA/L
CAT DANDER IGE QN: <0.1 KUA/L
COMMON RAGWEED IGE QN: 0.27 KUA/L
D FARINAE IGE QN: 0.14 KUA/L
D PTERONYSS IGE QN: 0.12 KUA/L
DEPRECATED A ALTERNATA IGE RAST QL: 0
DEPRECATED A FUMIGATUS IGE RAST QL: 0
DEPRECATED C ALBICANS IGE RAST QL: 0
DEPRECATED C HERBARUM IGE RAST QL: 0
DEPRECATED CAT DANDER IGE RAST QL: 0
DEPRECATED COMMON RAGWEED IGE RAST QL: NORMAL
DEPRECATED D FARINAE IGE RAST QL: NORMAL
DEPRECATED D PTERONYSS IGE RAST QL: NORMAL
DEPRECATED DOG DANDER IGE RAST QL: 0
DEPRECATED KAPPA LC FREE/LAMBDA SER: 1.2 RATIO
DEPRECATED M RACEMOSUS IGE RAST QL: 0
DEPRECATED ROACH IGE RAST QL: 1
DEPRECATED TIMOTHY IGE RAST QL: 0
DEPRECATED WHITE OAK IGE RAST QL: 0
DOG DANDER IGE QN: <0.1 KUA/L
IGA SERPL-MCNC: 366 MG/DL
IGG SERPL-MCNC: 836 MG/DL
IGM SERPL-MCNC: 76 MG/DL
KAPPA LC CSF-MCNC: 1.28 MG/DL
KAPPA LC SERPL-MCNC: 1.54 MG/DL
M RACEMOSUS IGE QN: <0.1 KUA/L
ROACH IGE QN: 0.44 KUA/L
TIMOTHY IGE QN: <0.1 KUA/L
TOTAL IGE SMQN RAST: 427 KU/L
WHITE OAK IGE QN: <0.1 KUA/L

## 2025-09-08 LAB
ALTERNARIA TENUIS/ALTERNATA IGG: 3.8 MCG/ML
ASPER FUMCAPG(M3): 72.1 MCG/ML
AUREOBASCAPG(M12): 4.8 MCG/ML
LACEYELLA SACCHARI IGG: 7.3 MCG/ML
MICROPOLYCAPG(M22): 2.4 MCG/ML
PENIC CHRYCAPG(M1): 50 MCG/ML
PHOMA BETAE IGG: 8.4 MCG/ML
STRONGYLOIDES AB SER IA-ACNC: POSITIVE
TRICHODERMA VIRIDE IGG: 5.8 MCG/ML